# Patient Record
Sex: MALE | Race: BLACK OR AFRICAN AMERICAN | Employment: UNEMPLOYED | URBAN - METROPOLITAN AREA
[De-identification: names, ages, dates, MRNs, and addresses within clinical notes are randomized per-mention and may not be internally consistent; named-entity substitution may affect disease eponyms.]

---

## 2018-09-10 ENCOUNTER — OFFICE VISIT (OUTPATIENT)
Dept: PRIMARY CARE CLINIC | Age: 58
End: 2018-09-10

## 2018-09-10 VITALS
BODY MASS INDEX: 24.56 KG/M2 | WEIGHT: 208 LBS | TEMPERATURE: 97.1 F | HEART RATE: 78 BPM | OXYGEN SATURATION: 97 % | HEIGHT: 77 IN | DIASTOLIC BLOOD PRESSURE: 78 MMHG | SYSTOLIC BLOOD PRESSURE: 123 MMHG

## 2018-09-10 DIAGNOSIS — M19.042 ARTHRITIS OF BOTH HANDS: ICD-10-CM

## 2018-09-10 DIAGNOSIS — M50.30 DDD (DEGENERATIVE DISC DISEASE), CERVICAL: ICD-10-CM

## 2018-09-10 DIAGNOSIS — M19.041 ARTHRITIS OF BOTH HANDS: ICD-10-CM

## 2018-09-10 DIAGNOSIS — I10 ESSENTIAL HYPERTENSION: ICD-10-CM

## 2018-09-10 DIAGNOSIS — M51.36 DDD (DEGENERATIVE DISC DISEASE), LUMBAR: ICD-10-CM

## 2018-09-10 DIAGNOSIS — Z23 NEED FOR PROPHYLACTIC VACCINATION AND INOCULATION AGAINST VARICELLA: ICD-10-CM

## 2018-09-10 DIAGNOSIS — R76.8 ANA POSITIVE: ICD-10-CM

## 2018-09-10 DIAGNOSIS — Z11.4 SCREENING FOR HIV (HUMAN IMMUNODEFICIENCY VIRUS): ICD-10-CM

## 2018-09-10 DIAGNOSIS — R76.8 SS-B ANTIBODY POSITIVE: ICD-10-CM

## 2018-09-10 DIAGNOSIS — Z23 NEED FOR SHINGLES VACCINE: ICD-10-CM

## 2018-09-10 DIAGNOSIS — I10 ESSENTIAL HYPERTENSION: Primary | ICD-10-CM

## 2018-09-10 DIAGNOSIS — R76.8 SS-A ANTIBODY POSITIVE: ICD-10-CM

## 2018-09-10 DIAGNOSIS — Z12.11 SCREENING FOR COLON CANCER: ICD-10-CM

## 2018-09-10 LAB
A/G RATIO: 1.1 (ref 1.1–2.2)
ALBUMIN SERPL-MCNC: 4.5 G/DL (ref 3.4–5)
ALP BLD-CCNC: 87 U/L (ref 40–129)
ALT SERPL-CCNC: 12 U/L (ref 10–40)
ANION GAP SERPL CALCULATED.3IONS-SCNC: 13 MMOL/L (ref 3–16)
AST SERPL-CCNC: 15 U/L (ref 15–37)
BASOPHILS ABSOLUTE: 0 K/UL (ref 0–0.2)
BASOPHILS RELATIVE PERCENT: 0.7 %
BILIRUB SERPL-MCNC: 0.3 MG/DL (ref 0–1)
BUN BLDV-MCNC: 9 MG/DL (ref 7–20)
CALCIUM SERPL-MCNC: 10 MG/DL (ref 8.3–10.6)
CHLORIDE BLD-SCNC: 102 MMOL/L (ref 99–110)
CO2: 27 MMOL/L (ref 21–32)
CREAT SERPL-MCNC: 1 MG/DL (ref 0.9–1.3)
EOSINOPHILS ABSOLUTE: 0.3 K/UL (ref 0–0.6)
EOSINOPHILS RELATIVE PERCENT: 6.6 %
GFR AFRICAN AMERICAN: >60
GFR NON-AFRICAN AMERICAN: >60
GLOBULIN: 4 G/DL
GLUCOSE BLD-MCNC: 87 MG/DL (ref 70–99)
HCT VFR BLD CALC: 41.4 % (ref 40.5–52.5)
HEMOGLOBIN: 13.2 G/DL (ref 13.5–17.5)
LYMPHOCYTES ABSOLUTE: 1.5 K/UL (ref 1–5.1)
LYMPHOCYTES RELATIVE PERCENT: 33 %
MCH RBC QN AUTO: 24.9 PG (ref 26–34)
MCHC RBC AUTO-ENTMCNC: 31.8 G/DL (ref 31–36)
MCV RBC AUTO: 78.3 FL (ref 80–100)
MONOCYTES ABSOLUTE: 0.4 K/UL (ref 0–1.3)
MONOCYTES RELATIVE PERCENT: 8.6 %
NEUTROPHILS ABSOLUTE: 2.4 K/UL (ref 1.7–7.7)
NEUTROPHILS RELATIVE PERCENT: 51.1 %
PDW BLD-RTO: 18.9 % (ref 12.4–15.4)
PLATELET # BLD: 274 K/UL (ref 135–450)
PMV BLD AUTO: 8.6 FL (ref 5–10.5)
POTASSIUM SERPL-SCNC: 4.7 MMOL/L (ref 3.5–5.1)
RBC # BLD: 5.29 M/UL (ref 4.2–5.9)
SEDIMENTATION RATE, ERYTHROCYTE: 24 MM/HR (ref 0–20)
SODIUM BLD-SCNC: 142 MMOL/L (ref 136–145)
TOTAL PROTEIN: 8.5 G/DL (ref 6.4–8.2)
TSH SERPL DL<=0.05 MIU/L-ACNC: 1.84 UIU/ML (ref 0.27–4.2)
URIC ACID, SERUM: 8.4 MG/DL (ref 3.5–7.2)
WBC # BLD: 4.6 K/UL (ref 4–11)

## 2018-09-10 PROCEDURE — G8420 CALC BMI NORM PARAMETERS: HCPCS | Performed by: FAMILY MEDICINE

## 2018-09-10 PROCEDURE — G8427 DOCREV CUR MEDS BY ELIG CLIN: HCPCS | Performed by: FAMILY MEDICINE

## 2018-09-10 PROCEDURE — 3017F COLORECTAL CA SCREEN DOC REV: CPT | Performed by: FAMILY MEDICINE

## 2018-09-10 PROCEDURE — 99204 OFFICE O/P NEW MOD 45 MIN: CPT | Performed by: FAMILY MEDICINE

## 2018-09-10 PROCEDURE — 1036F TOBACCO NON-USER: CPT | Performed by: FAMILY MEDICINE

## 2018-09-10 RX ORDER — LISINOPRIL 20 MG/1
20 TABLET ORAL DAILY
Qty: 30 TABLET | Refills: 3 | Status: SHIPPED | OUTPATIENT
Start: 2018-09-10 | End: 2019-01-31 | Stop reason: SDUPTHER

## 2018-09-10 RX ORDER — CYCLOBENZAPRINE HCL 10 MG
10 TABLET ORAL 2 TIMES DAILY PRN
Qty: 60 TABLET | Refills: 1 | Status: SHIPPED | OUTPATIENT
Start: 2018-09-10 | End: 2019-01-16 | Stop reason: SDUPTHER

## 2018-09-10 RX ORDER — GABAPENTIN 600 MG/1
600 TABLET ORAL 3 TIMES DAILY
COMMUNITY
Start: 2015-10-08 | End: 2018-09-10 | Stop reason: SDUPTHER

## 2018-09-10 RX ORDER — OXYCODONE AND ACETAMINOPHEN 7.5; 325 MG/1; MG/1
1 TABLET ORAL EVERY 6 HOURS PRN
COMMUNITY
End: 2018-10-16 | Stop reason: ALTCHOICE

## 2018-09-10 RX ORDER — GABAPENTIN 600 MG/1
600 TABLET ORAL 3 TIMES DAILY
Qty: 90 TABLET | Refills: 5 | Status: SHIPPED | OUTPATIENT
Start: 2018-09-10 | End: 2020-08-12 | Stop reason: SDUPTHER

## 2018-09-10 RX ORDER — NAPROXEN 500 MG/1
500 TABLET ORAL 2 TIMES DAILY WITH MEALS
Qty: 60 TABLET | Refills: 3 | Status: SHIPPED | OUTPATIENT
Start: 2018-09-10 | End: 2019-02-12 | Stop reason: SDUPTHER

## 2018-09-10 ASSESSMENT — ENCOUNTER SYMPTOMS
SORE THROAT: 0
EYE PAIN: 0
CHOKING: 0
VOICE CHANGE: 0
ABDOMINAL PAIN: 0
CHEST TIGHTNESS: 0
NAUSEA: 0
ANAL BLEEDING: 0
PHOTOPHOBIA: 0
FACIAL SWELLING: 0
VOMITING: 0
COUGH: 0
EYE ITCHING: 0
ORTHOPNEA: 0
EYE REDNESS: 0
APNEA: 0
COLOR CHANGE: 0
EYE DISCHARGE: 0
BLURRED VISION: 0
BLOOD IN STOOL: 0
TROUBLE SWALLOWING: 0
SINUS PRESSURE: 0
RECTAL PAIN: 0
CONSTIPATION: 0
BACK PAIN: 0
SHORTNESS OF BREATH: 0
WHEEZING: 0

## 2018-09-10 ASSESSMENT — PATIENT HEALTH QUESTIONNAIRE - PHQ9
SUM OF ALL RESPONSES TO PHQ9 QUESTIONS 1 & 2: 1
1. LITTLE INTEREST OR PLEASURE IN DOING THINGS: 0
SUM OF ALL RESPONSES TO PHQ QUESTIONS 1-9: 1
2. FEELING DOWN, DEPRESSED OR HOPELESS: 1
SUM OF ALL RESPONSES TO PHQ QUESTIONS 1-9: 1

## 2018-09-10 NOTE — PROGRESS NOTES
Never Used    Alcohol use No    Drug use: No    Sexual activity: Not on file     Other Topics Concern    Not on file     Social History Narrative    No narrative on file     Past Surgical History:   Procedure Laterality Date    COLONOSCOPY      ENDOSCOPY, COLON, DIAGNOSTIC         Review of Systems   Constitutional: Negative for activity change, appetite change, chills, diaphoresis, fatigue, fever, malaise/fatigue and unexpected weight change. HENT: Negative for congestion, dental problem, drooling, ear discharge, ear pain, facial swelling, hearing loss, mouth sores, nosebleeds, postnasal drip, sinus pressure, sneezing, sore throat, tinnitus, trouble swallowing and voice change. Eyes: Negative for blurred vision, photophobia, pain, discharge, redness, itching and visual disturbance. Respiratory: Negative for apnea, cough, choking, chest tightness, shortness of breath and wheezing. Cardiovascular: Negative for chest pain, palpitations, orthopnea, leg swelling and PND. Gastrointestinal: Negative for abdominal pain, anal bleeding, blood in stool, constipation, nausea, rectal pain and vomiting. Genitourinary: Negative for decreased urine volume, difficulty urinating, discharge, dysuria, enuresis, flank pain, frequency, hematuria, penile swelling, scrotal swelling, testicular pain and urgency. Musculoskeletal: Negative for arthralgias, back pain, gait problem, joint swelling, myalgias, neck pain and neck stiffness. Bilat hand pain, left upper and lower extremities pain/numbness    Chronic neck and lumbar pain       Skin: Negative for color change, pallor, rash and wound. Neurological: Negative for dizziness, tremors, seizures, syncope, facial asymmetry, speech difficulty, weakness, light-headedness, numbness and headaches. Hematological: Negative for adenopathy. Does not bruise/bleed easily.    Psychiatric/Behavioral: Negative for agitation, behavioral problems, confusion, decreased

## 2018-09-11 LAB
HIV AG/AB: NORMAL
HIV ANTIGEN: NORMAL
HIV-1 ANTIBODY: NORMAL
HIV-2 AB: NORMAL

## 2018-09-12 LAB
BILIRUBIN URINE: NEGATIVE
BLOOD, URINE: NEGATIVE
CLARITY: CLEAR
COLOR: YELLOW
GLUCOSE URINE: NEGATIVE MG/DL
KETONES, URINE: NEGATIVE MG/DL
LEUKOCYTE ESTERASE, URINE: NEGATIVE
MICROSCOPIC EXAMINATION: NORMAL
NITRITE, URINE: NEGATIVE
PH UA: 6
PROTEIN UA: NEGATIVE MG/DL
SPECIFIC GRAVITY UA: 1.02
URINE TYPE: NORMAL
UROBILINOGEN, URINE: 0.2 E.U./DL

## 2018-09-14 DIAGNOSIS — Z12.11 SCREENING FOR COLON CANCER: ICD-10-CM

## 2018-09-14 LAB
CONTROL: NORMAL
HEMOCCULT STL QL: NEGATIVE

## 2018-09-14 PROCEDURE — 82274 ASSAY TEST FOR BLOOD FECAL: CPT | Performed by: FAMILY MEDICINE

## 2018-09-15 ENCOUNTER — HOSPITAL ENCOUNTER (OUTPATIENT)
Dept: OTHER | Age: 58
Discharge: HOME | End: 2018-09-15
Attending: FAMILY MEDICINE | Admitting: FAMILY MEDICINE

## 2018-09-15 DIAGNOSIS — M50.30 DDD (DEGENERATIVE DISC DISEASE), CERVICAL: ICD-10-CM

## 2018-09-15 DIAGNOSIS — M51.36 DDD (DEGENERATIVE DISC DISEASE), LUMBAR: ICD-10-CM

## 2018-10-01 ENCOUNTER — OFFICE VISIT (OUTPATIENT)
Dept: PRIMARY CARE CLINIC | Age: 58
End: 2018-10-01
Payer: COMMERCIAL

## 2018-10-01 VITALS
OXYGEN SATURATION: 97 % | DIASTOLIC BLOOD PRESSURE: 96 MMHG | HEIGHT: 77 IN | HEART RATE: 72 BPM | WEIGHT: 221 LBS | SYSTOLIC BLOOD PRESSURE: 155 MMHG | TEMPERATURE: 96.9 F | BODY MASS INDEX: 26.09 KG/M2

## 2018-10-01 DIAGNOSIS — M51.36 DDD (DEGENERATIVE DISC DISEASE), LUMBAR: ICD-10-CM

## 2018-10-01 DIAGNOSIS — Z23 NEED FOR INFLUENZA VACCINATION: ICD-10-CM

## 2018-10-01 DIAGNOSIS — R76.8 SS-B ANTIBODY POSITIVE: ICD-10-CM

## 2018-10-01 DIAGNOSIS — D64.9 ANEMIA, UNSPECIFIED TYPE: ICD-10-CM

## 2018-10-01 DIAGNOSIS — R73.03 PREDIABETES: ICD-10-CM

## 2018-10-01 DIAGNOSIS — R76.8 SS-A ANTIBODY POSITIVE: ICD-10-CM

## 2018-10-01 DIAGNOSIS — R76.8 ANA POSITIVE: ICD-10-CM

## 2018-10-01 DIAGNOSIS — I10 ESSENTIAL HYPERTENSION: Primary | ICD-10-CM

## 2018-10-01 DIAGNOSIS — M19.049 HAND ARTHRITIS: ICD-10-CM

## 2018-10-01 PROCEDURE — G8419 CALC BMI OUT NRM PARAM NOF/U: HCPCS | Performed by: FAMILY MEDICINE

## 2018-10-01 PROCEDURE — 99213 OFFICE O/P EST LOW 20 MIN: CPT | Performed by: FAMILY MEDICINE

## 2018-10-01 PROCEDURE — 3017F COLORECTAL CA SCREEN DOC REV: CPT | Performed by: FAMILY MEDICINE

## 2018-10-01 PROCEDURE — G8427 DOCREV CUR MEDS BY ELIG CLIN: HCPCS | Performed by: FAMILY MEDICINE

## 2018-10-01 PROCEDURE — 1036F TOBACCO NON-USER: CPT | Performed by: FAMILY MEDICINE

## 2018-10-01 PROCEDURE — 90471 IMMUNIZATION ADMIN: CPT | Performed by: FAMILY MEDICINE

## 2018-10-01 PROCEDURE — G8482 FLU IMMUNIZE ORDER/ADMIN: HCPCS | Performed by: FAMILY MEDICINE

## 2018-10-01 PROCEDURE — 90686 IIV4 VACC NO PRSV 0.5 ML IM: CPT | Performed by: FAMILY MEDICINE

## 2018-10-01 ASSESSMENT — ENCOUNTER SYMPTOMS
ABDOMINAL PAIN: 0
CHOKING: 0
RECTAL PAIN: 0
EYE DISCHARGE: 0
TROUBLE SWALLOWING: 0
NAUSEA: 0
PHOTOPHOBIA: 0
EYE PAIN: 0
SORE THROAT: 0
COLOR CHANGE: 0
VOICE CHANGE: 0
APNEA: 0
EYE REDNESS: 0
FACIAL SWELLING: 0
CHEST TIGHTNESS: 0
WHEEZING: 0
SINUS PRESSURE: 0
SHORTNESS OF BREATH: 0
BLOOD IN STOOL: 0
VOMITING: 0
EYE ITCHING: 0
BACK PAIN: 1
COUGH: 0
ANAL BLEEDING: 0
CONSTIPATION: 0

## 2018-10-01 NOTE — PATIENT INSTRUCTIONS
Patient Education          influenza virus vaccine (injection)  Pronunciation:  in judd lala VAK seen  Brand:  Afluria [id number], Afluria Preservative-Free [id number], Afluria Preservative-Free Quadrivalent [id number], Afluria Quadrivalent [id number], Fluad [id number], Fluarix Preservative-Free Quadrivalent [id number], Flublok Quadrivalent [id number], FluLaval Preservative-Free Quadrivalent [id number], FluLaval Quadrivalent [id number], Fluvirin [id number], Fluvirin Preservative-Free [id number], Fluzone High-Dose [id number], Fluzone Preservative-Free Quadrivalent 1205-2816, Fluzone Quadrivalent [id number], Fluzone Quadrivalent Intradermal [id number]  What is the most important information I should know about this vaccine? The injectable influenza virus vaccine (flu shot) is a \"killed virus\" vaccine. Influenza virus vaccine is also available in a nasal spray form, which is a \"live virus\" vaccine. This medication guide addresses only the injectable form of this vaccine. Becoming infected with influenza is much more dangerous to your health than receiving this vaccine. However, like any medicine, this vaccine can cause side effects but the risk of serious side effects is extremely low. What is influenza virus vaccine? Influenza virus (commonly known as \"the flu\") is a serious disease caused by a virus. Influenza virus can spread from one person to another through small droplets of saliva that are expelled into the air when an infected person coughs or sneezes. The virus can also be passed through contact with objects the infected person has touched, such as a door handle or other surfaces. Influenza virus vaccine is used to prevent infection caused by influenza virus. The vaccine is redeveloped each year to contain specific strains of inactivated (killed) flu virus that are recommended by public health officials for that year. The injectable influenza virus vaccine (flu shot) is a \"killed virus\" vaccine.  Influenza wait until the other treatments are finished:  · phenytoin, theophylline, or warfarin (Coumadin, Jantoven);  · an oral, nasal, inhaled, or injectable steroid medicine;  · medications to treat psoriasis, rheumatoid arthritis, or other autoimmune disorders--azathioprine, etanercept, leflunomide, and others; or  · medicines to treat or prevent organ transplant rejection--basiliximab, cyclosporine, muromonab-CD3, mycophenolate mofetil, sirolimus, tacrolimus. This list is not complete. Other drugs may interact with influenza virus vaccine, including prescription and over-the-counter medicines, vitamins, and herbal products. Not all possible interactions are listed in this medication guide. Where can I get more information? Your doctor or pharmacist can provide more information about this vaccine. Additional information is available from your local health department or the Centers for Disease Control and Prevention. Remember, keep this and all other medicines out of the reach of children, never share your medicines with others, and use this medication only for the indication prescribed. Every effort has been made to ensure that the information provided by [address] is accurate, up-to-date, and complete, but no guarantee is made to that effect. Drug information contained herein may be time sensitive. [facility] information has been compiled for use by healthcare practitioners and consumers in the [facility]  and therefore [facility] does not warrant that uses outside of the [facility] are appropriate, unless specifically indicated otherwise. [facility] drug information does not endorse drugs, diagnose patients or recommend therapy.  [facility] drug information is an informational resource designed to assist licensed healthcare practitioners in caring for their patients and/or to serve consumers viewing this service as a supplement to, and not a substitute for, the expertise, skill, knowledge and judgment of healthcare practitioners. The absence of a warning for a given drug or drug combination in no way should be construed to indicate that the drug or drug combination is safe, effective or appropriate for any given patient. [facility] does not assume any responsibility for any aspect of healthcare administered with the aid of information [facility] provides. The information contained herein is not intended to cover all possible uses, directions, precautions, warnings, drug interactions, allergic reactions, or adverse effects. If you have questions about the drugs you are taking, check with your doctor, nurse or pharmacist.  Copyright 0024-2107 [address]. Version: 7.12. Revision date: 8/4/2017. Care instructions adapted under license by [facility] ([facility]). If you have questions about a medical condition or this instruction, always ask your healthcare professional. [place] [address] any warranty or liability for your use of this information.

## 2018-10-01 NOTE — PROGRESS NOTES
Psychiatric: He has a normal mood and affect. His behavior is normal. Judgment and thought content normal.   Nursing note and vitals reviewed. xrays show deg changes in the cervical spine and lumbar spine without acute abnormality. Assessment:      1. Essential hypertension  bp is up  Has not gotten refill of lisinopril yet  Asked him to do so    2. DDD (degenerative disc disease), lumbar    amb ref Yris Glynn MD  [address]    3. Hand arthritis        4. Anemia, unspecified type  Ref for possible colonoscopy  - Amb External Referral To Gastroenterology    5. TISHA positive    -    6. SS-A antibody positive    -     7. SS-B antibody positive        8.  Need for influenza vaccination    - INFLUENZA, QUADV, 3 YRS AND OLDER, IM, PF, PREFILL SYR OR SDV, 0.5ML (FLUZONE QUADV, PF)       Plan:         see me in 2 months       Fredrick De Anda MD

## 2018-10-05 ENCOUNTER — TELEPHONE (OUTPATIENT)
Dept: PAIN MANAGEMENT | Age: 58
End: 2018-10-05

## 2018-10-15 NOTE — PROGRESS NOTES
surgical intervention. 2. Plan of Care Recommendations:    Chronic pain in lower back>neck with radiation to left side; no acute changes. Pain unimproved by lumbar fusion in [place]; recently moved back to [place]. Review of XRays showed PLIF L3-S1 in lumbar spine and spondylotic changes in cervical spine. Recommend PT, aquatherapy and TENS trial; encouraged regular home exercises. History of TISHA positive arthropathy - will refer to Rheum. Ongoing depression issues - will refer to [facility] and place him on nortriptyline. Continue naprosyn, gabapentin and Flexeril - will provide Percocet 5 mg for PRN use. Follow with us after therapy for further options. · Imaging/Testing/Referrals: Rheumatology    · Physical therapy: PT, aquatherapy and TENS    · Behavioral Health: Refer to [facility] for counseling. · Interventions: TBD    · Adjuvant medications: Naprosyn, Flexeril, Neurontin, nortriptyline. · Prescription Pain Medication: Percocet 5 mg for PRN use.    - recommend limited use for flare-ups. · MEDD current (per OARRS) = 0  · Opioid Risk Tool (ORT): 3; Risk level - LOW   · Other Risk factors: Depression. · Urine Drug Screen: Today. · Medication Agreement: Today. · /OARRS review: Reviewed today. Attestation: The Prescription Monitoring Report for this patient was reviewed today. Lupe Banks MD)  Documentation: Possible medication side effects, risk of tolerance/dependence & alternative treatments discussed. , Random urine drug screen sent today., No signs of potential drug abuse or diversion identified. Lupe Banks MD)     - Reviewed OARRS report in detail, including Narx Scores and Overdose Risk Score.    - Counseled on dual effects, limitations, side effects and long-term complications of opioids including but not limited to opioid induced constipation, depression of breathing, sleep disorders, hormonal suppression, altered immune system, elevated cardiac risk, worsening pain or hyperalgesia, dependence and addiction. Discussed proper use and potential life threatening side effects of inappropriate use. - Discussed safe use of prescription pain medications, including not to take more than prescribed, not to combine with alcohol or other substances, not to drive while on pain medications, store medications safely, and measures for prevention of overdose. 4. Education:    - Educational material provided on multidisciplinary chronic pain management. - Encouraged smoking cessation, weight loss measures, regular home exercises and strengthening as long-term goals. - Counseled on role and limitations of medications and injections.  - Educational material from [employer] on 'Prescription Opioids', and 'Non-opioid treatment of chronic pain' provided. 5. Follow-up: RTC X 2 months. - The entire treatment plan was discussed with patient and agreed. - More than 45 minutes spent on face-to-face encounter with the patient by the provider.   - More than 50% of the time spent on counseling/coordination of care regarding chronic pain. Thank you for the referral - please do not hesitate to contact us if you have any questions or concerns.           Abimael Snyder MD  Board Certified in Anesthesiology and Pain Medicine

## 2018-10-16 ENCOUNTER — OFFICE VISIT (OUTPATIENT)
Dept: PAIN MANAGEMENT | Age: 58
End: 2018-10-16
Payer: COMMERCIAL

## 2018-10-16 VITALS
HEIGHT: 77 IN | BODY MASS INDEX: 26.09 KG/M2 | DIASTOLIC BLOOD PRESSURE: 84 MMHG | WEIGHT: 221 LBS | SYSTOLIC BLOOD PRESSURE: 138 MMHG

## 2018-10-16 DIAGNOSIS — M54.16 LUMBAR RADICULOPATHY: ICD-10-CM

## 2018-10-16 DIAGNOSIS — G89.4 CHRONIC PAIN SYNDROME: ICD-10-CM

## 2018-10-16 DIAGNOSIS — Z51.81 ENCOUNTER FOR MONITORING OPIOID MAINTENANCE THERAPY: ICD-10-CM

## 2018-10-16 DIAGNOSIS — Z79.891 ENCOUNTER FOR MONITORING OPIOID MAINTENANCE THERAPY: ICD-10-CM

## 2018-10-16 DIAGNOSIS — M54.12 CERVICAL RADICULOPATHY: ICD-10-CM

## 2018-10-16 DIAGNOSIS — F32.89 OTHER DEPRESSION: ICD-10-CM

## 2018-10-16 DIAGNOSIS — M96.1 LUMBAR POSTLAMINECTOMY SYNDROME: Primary | ICD-10-CM

## 2018-10-16 DIAGNOSIS — M50.30 DEGENERATIVE DISC DISEASE, CERVICAL: ICD-10-CM

## 2018-10-16 PROCEDURE — G8427 DOCREV CUR MEDS BY ELIG CLIN: HCPCS | Performed by: ANESTHESIOLOGY

## 2018-10-16 PROCEDURE — 1036F TOBACCO NON-USER: CPT | Performed by: ANESTHESIOLOGY

## 2018-10-16 PROCEDURE — 3017F COLORECTAL CA SCREEN DOC REV: CPT | Performed by: ANESTHESIOLOGY

## 2018-10-16 PROCEDURE — G8419 CALC BMI OUT NRM PARAM NOF/U: HCPCS | Performed by: ANESTHESIOLOGY

## 2018-10-16 PROCEDURE — 99204 OFFICE O/P NEW MOD 45 MIN: CPT | Performed by: ANESTHESIOLOGY

## 2018-10-16 PROCEDURE — G8482 FLU IMMUNIZE ORDER/ADMIN: HCPCS | Performed by: ANESTHESIOLOGY

## 2018-10-16 RX ORDER — OXYCODONE HYDROCHLORIDE AND ACETAMINOPHEN 5; 325 MG/1; MG/1
1 TABLET ORAL EVERY 8 HOURS PRN
Qty: 20 TABLET | Refills: 0 | Status: SHIPPED | OUTPATIENT
Start: 2018-10-16 | End: 2018-10-23

## 2018-10-16 RX ORDER — NORTRIPTYLINE HYDROCHLORIDE 10 MG/1
CAPSULE ORAL
Qty: 60 CAPSULE | Refills: 1 | Status: SHIPPED | OUTPATIENT
Start: 2018-10-16 | End: 2018-12-17 | Stop reason: ALTCHOICE

## 2018-10-20 LAB
6-ACETYLMORPHINE: NOT DETECTED
7-AMINOCLONAZEPAM: NOT DETECTED
ALPHA-OH-ALPRAZOLAM: NOT DETECTED
ALPRAZOLAM: NOT DETECTED
AMPHETAMINE: NOT DETECTED
BARBITURATES: NOT DETECTED
BENZOYLECGONINE: PRESENT
BUPRENORPHINE: NOT DETECTED
CARISOPRODOL: NOT DETECTED
CLONAZEPAM: NOT DETECTED
CODEINE: NOT DETECTED
CREATININE URINE: 127.1 MG/DL (ref 20–400)
DIAZEPAM: NOT DETECTED
DRUGS EXPECTED: NORMAL
EER PAIN MGT DRUG PANEL, HIGH RES/EMIT U: NORMAL
ETHYL GLUCURONIDE: NOT DETECTED
FENTANYL: NOT DETECTED
HYDROCODONE: NOT DETECTED
HYDROMORPHONE: NOT DETECTED
LORAZEPAM: NOT DETECTED
MARIJUANA METABOLITE: NOT DETECTED
MDA: NOT DETECTED
MDEA: NOT DETECTED
MDMA URINE: NOT DETECTED
MEPERIDINE: NOT DETECTED
METHADONE: NOT DETECTED
METHAMPHETAMINE: NOT DETECTED
METHYLPHENIDATE: NOT DETECTED
MIDAZOLAM: NOT DETECTED
MORPHINE: NOT DETECTED
NORBUPRENORPHINE, FREE: NOT DETECTED
NORDIAZEPAM: NOT DETECTED
NORFENTANYL: NOT DETECTED
NORHYDROCODONE, URINE: NOT DETECTED
NOROXYCODONE: NOT DETECTED
NOROXYMORPHONE, URINE: NOT DETECTED
OXAZEPAM: NOT DETECTED
OXYCODONE: NOT DETECTED
OXYMORPHONE: NOT DETECTED
PAIN MANAGEMENT DRUG PANEL: NORMAL
PAIN MANAGEMENT DRUG PANEL: NORMAL
PCP: NOT DETECTED
PHENTERMINE: NOT DETECTED
PROPOXYPHENE: NOT DETECTED
TAPENTADOL, URINE: NOT DETECTED
TAPENTADOL-O-SULFATE, URINE: NOT DETECTED
TEMAZEPAM: NOT DETECTED
TRAMADOL: NOT DETECTED
ZOLPIDEM: NOT DETECTED

## 2018-11-02 ENCOUNTER — OFFICE VISIT (OUTPATIENT)
Dept: PRIMARY CARE CLINIC | Age: 58
End: 2018-11-02
Payer: COMMERCIAL

## 2018-11-02 VITALS
TEMPERATURE: 96.9 F | SYSTOLIC BLOOD PRESSURE: 130 MMHG | DIASTOLIC BLOOD PRESSURE: 83 MMHG | WEIGHT: 228 LBS | BODY MASS INDEX: 26.92 KG/M2 | HEART RATE: 95 BPM | HEIGHT: 77 IN | OXYGEN SATURATION: 97 %

## 2018-11-02 DIAGNOSIS — M50.30 DDD (DEGENERATIVE DISC DISEASE), CERVICAL: ICD-10-CM

## 2018-11-02 DIAGNOSIS — M51.36 DDD (DEGENERATIVE DISC DISEASE), LUMBAR: ICD-10-CM

## 2018-11-02 DIAGNOSIS — I10 ESSENTIAL HYPERTENSION: Primary | ICD-10-CM

## 2018-11-02 PROCEDURE — 99214 OFFICE O/P EST MOD 30 MIN: CPT | Performed by: FAMILY MEDICINE

## 2018-11-02 PROCEDURE — 3017F COLORECTAL CA SCREEN DOC REV: CPT | Performed by: FAMILY MEDICINE

## 2018-11-02 PROCEDURE — G8427 DOCREV CUR MEDS BY ELIG CLIN: HCPCS | Performed by: FAMILY MEDICINE

## 2018-11-02 PROCEDURE — G8419 CALC BMI OUT NRM PARAM NOF/U: HCPCS | Performed by: FAMILY MEDICINE

## 2018-11-02 PROCEDURE — G8482 FLU IMMUNIZE ORDER/ADMIN: HCPCS | Performed by: FAMILY MEDICINE

## 2018-11-02 PROCEDURE — 1036F TOBACCO NON-USER: CPT | Performed by: FAMILY MEDICINE

## 2018-11-02 ASSESSMENT — ENCOUNTER SYMPTOMS
SORE THROAT: 0
SHORTNESS OF BREATH: 0
BLURRED VISION: 0
EYE ITCHING: 0
FACIAL SWELLING: 0
APNEA: 0
EYE PAIN: 0
BACK PAIN: 1
ORTHOPNEA: 0
COUGH: 0
CHEST TIGHTNESS: 0
CONSTIPATION: 0
VOICE CHANGE: 0
SINUS PRESSURE: 0
NAUSEA: 0
EYE DISCHARGE: 0
ANAL BLEEDING: 0
TROUBLE SWALLOWING: 0
CHOKING: 0
COLOR CHANGE: 0
BLOOD IN STOOL: 0
EYE REDNESS: 0
RECTAL PAIN: 0
WHEEZING: 0
ABDOMINAL PAIN: 0
VOMITING: 0
PHOTOPHOBIA: 0

## 2018-11-02 NOTE — PROGRESS NOTES
decreased urine volume, difficulty urinating, discharge, dysuria, enuresis, flank pain, frequency, hematuria, penile swelling, scrotal swelling, testicular pain and urgency. Musculoskeletal: Positive for back pain. Negative for arthralgias, gait problem, joint swelling, myalgias, neck pain and neck stiffness. Left arm, left leg pain/numbness   Skin: Negative for color change, pallor, rash and wound. Neurological: Negative for dizziness, tremors, seizures, syncope, facial asymmetry, speech difficulty, weakness, light-headedness, numbness and headaches. Hematological: Negative for adenopathy. Does not bruise/bleed easily. Psychiatric/Behavioral: Negative for agitation, behavioral problems, confusion, decreased concentration, dysphoric mood, hallucinations, self-injury, sleep disturbance and suicidal ideas. The patient is not nervous/anxious and is not hyperactive. Objective:   Physical Exam   Constitutional: He is oriented to person, place, and time. He appears well-developed and well-nourished. No distress. HENT:   Head: Normocephalic and atraumatic. Right Ear: External ear normal.   Left Ear: External ear normal.   Nose: Nose normal.   Mouth/Throat: Oropharynx is clear and moist. No oropharyngeal exudate. Eyes: Pupils are equal, round, and reactive to light. Conjunctivae and EOM are normal. Right eye exhibits no discharge. Left eye exhibits no discharge. No scleral icterus. Neck: Normal range of motion. Neck supple. No JVD present. No tracheal deviation present. No thyromegaly present. Cardiovascular: Normal rate, regular rhythm, normal heart sounds and intact distal pulses. Exam reveals no friction rub. No murmur heard. Pulses:       Carotid pulses are 2+ on the right side, and 2+ on the left side. Radial pulses are 2+ on the right side, and 2+ on the left side. Femoral pulses are 2+ on the right side, and 2+ on the left side.        Popliteal pulses are 2+ on the right side, and 2+ on the left side. Dorsalis pedis pulses are 2+ on the right side, and 2+ on the left side. Posterior tibial pulses are 2+ on the right side, and 2+ on the left side. Pulmonary/Chest: Effort normal and breath sounds normal. No stridor. No respiratory distress. He has no wheezes. He has no rales. He exhibits no tenderness. Abdominal: Soft. Bowel sounds are normal. He exhibits no distension and no mass. There is no tenderness. There is no rebound and no guarding. Musculoskeletal: Normal range of motion. He exhibits no edema or tenderness. Lymphadenopathy:     He has no cervical adenopathy. Neurological: He is oriented to person, place, and time. He displays normal reflexes. No cranial nerve deficit. He exhibits normal muscle tone. Coordination normal.   Skin: Skin is warm and dry. No rash noted. He is not diaphoretic. No pallor. Psychiatric: He has a normal mood and affect. His behavior is normal. Judgment and thought content normal.   Nursing note and vitals reviewed. Assessment:      1. Essential hypertension  bp is controlled on current treatment  Recent renal    2. DDD (degenerative disc disease), lumbar  On nsaids/muscle relaxants  Fu in pain clinic  3.  DDD (degenerative disc disease), cervical  On nsaids/muscle relaxants    Fu in pain clinic    as SS-A and SS-B antibody positive  And rheumatology consult    Has numbness LUIE & LLE  emg pending            Plan:            See me in 2 months  Wild Frey MD

## 2018-12-14 PROBLEM — G89.4 CHRONIC PAIN SYNDROME: Status: ACTIVE | Noted: 2018-12-14

## 2018-12-14 PROBLEM — F19.11 H/O: SUBSTANCE ABUSE (HCC): Status: ACTIVE | Noted: 2018-12-14

## 2018-12-14 PROBLEM — M96.1 POSTLAMINECTOMY SYNDROME, LUMBAR: Status: ACTIVE | Noted: 2018-12-14

## 2018-12-14 NOTE — PROGRESS NOTES
[address] [facility] [address], [place], [address]  [phone] (B)  [phone] (f)    12/17/18        Hezzie Dubin  1960      Gavin Deluca MD      Chief Complaint:   Chief Complaint   Patient presents with    Lower Back Pain     F/u on Lt side lower back pain that radiates down the Lt leg into the calf.  Neck Pain     Lt side neck pain that radiates into the Lt shoulder and amy the Lt arm to the hand. History of Present Illness   HPI    Seen us first in 10/2018 -  Chronic pain in neck and lower back for 5 years since work injury, unimproved by lumbar fusion in . [address], moved to [place] recently. Pain constant in lower back>neck, achy, sharp, with tingling down the left leg; no weakness, or incontinence. Pain worse with daily activities, changes in weather, bending, walking and nothing seems to help. RED FLAG symptoms (Symptoms may include, but not limited to, acute trauma,fever, drug use, malignancy, weakness, perianal numbness, bladder/bowel changes) since last visit - No    Changes since last visit:  Not completed EMG; rescheduled for later this week. Seen Rheum (Dr Bossman Mittal) - awaiting labs. Not been to PT or seen [address] [place] [address].       Past Medical History:   Diagnosis Date    Arthropathy     TISHA positive    Back pain     Depression     Hyperlipidemia     Hypertension        Past Surgical History:   Procedure Laterality Date    COLONOSCOPY      ENDOSCOPY, COLON, DIAGNOSTIC      LUMBAR FUSION  2017    PLIF L3-S1 done in [facility]. [address]       No Known Allergies    Current Outpatient Prescriptions   Medication Sig Dispense Refill    traZODone (DESYREL) 50 MG tablet Take 1 tablet by mouth nightly as needed for Sleep (chronic pain) 30 tablet 0    lisinopril (PRINIVIL;ZESTRIL) 20 MG tablet Take 1 tablet by mouth daily 30 tablet 3    cyclobenzaprine (FLEXERIL) 10 MG tablet Take 1 tablet by mouth 2 times daily as needed for Muscle spasms 60 tablet 1    naproxen Genitourinary: Negative for frequency, hematuria and urgency. Musculoskeletal: Positive for arthralgias, back pain and neck pain. Negative for myalgias. Skin: Negative for rash. Neurological: Negative for dizziness, seizures, weakness and headaches. Hematological: Does not bruise/bleed easily. Psychiatric/Behavioral: Negative for suicidal ideas. The patient is not nervous/anxious. The patient was instructed to contact primary care physician regarding ROS positives not being addressed during today's visit. Physical Exam:   Physical Exam   Constitutional: He is oriented to person, place, and time. No distress. HENT:   Head: Normocephalic. Eyes: Pupils are equal, round, and reactive to light. EOM are normal.   Neck: Normal range of motion. Neck supple. No thyromegaly present. Cardiovascular: Normal rate, regular rhythm, normal heart sounds and intact distal pulses. Pulmonary/Chest: Effort normal and breath sounds normal. No respiratory distress. He exhibits no tenderness. Abdominal: Soft. Bowel sounds are normal. He exhibits no mass. There is no tenderness. There is no guarding. Musculoskeletal: Normal range of motion. He exhibits no tenderness or deformity. Lymphadenopathy:     He has no cervical adenopathy. Neurological: He is alert and oriented to person, place, and time. He has normal strength and normal reflexes. He displays normal reflexes. No cranial nerve deficit or sensory deficit. He exhibits normal muscle tone. Coordination and gait normal. He displays no Babinski's sign on the right side. He displays no Babinski's sign on the left side. Reflex Scores:       Tricep reflexes are 2+ on the right side and 2+ on the left side. Bicep reflexes are 2+ on the right side and 2+ on the left side. Brachioradialis reflexes are 2+ on the right side and 2+ on the left side. Patellar reflexes are 2+ on the right side and 2+ on the left side.        [place] in shared decision making. The entire treatment plan was discussed with patient and agreed. Thank you for allowing us to participate in the care of your patient - please do not hesitate to contact us if you have any questions or concerns.         Jeffrey Fry MD  Board Certified in Anesthesiology and Pain Medicine

## 2018-12-17 ENCOUNTER — OFFICE VISIT (OUTPATIENT)
Dept: PAIN MANAGEMENT | Age: 58
End: 2018-12-17
Payer: COMMERCIAL

## 2018-12-17 VITALS
WEIGHT: 228 LBS | HEIGHT: 77 IN | SYSTOLIC BLOOD PRESSURE: 122 MMHG | BODY MASS INDEX: 26.92 KG/M2 | DIASTOLIC BLOOD PRESSURE: 84 MMHG

## 2018-12-17 DIAGNOSIS — M13.0 POLYARTHROPATHY, MULTIPLE SITES: ICD-10-CM

## 2018-12-17 DIAGNOSIS — M96.1 POSTLAMINECTOMY SYNDROME, LUMBAR: Primary | ICD-10-CM

## 2018-12-17 DIAGNOSIS — M50.30 DDD (DEGENERATIVE DISC DISEASE), CERVICAL: ICD-10-CM

## 2018-12-17 DIAGNOSIS — F19.11 H/O: SUBSTANCE ABUSE (HCC): ICD-10-CM

## 2018-12-17 DIAGNOSIS — G89.4 CHRONIC PAIN SYNDROME: ICD-10-CM

## 2018-12-17 PROCEDURE — G8482 FLU IMMUNIZE ORDER/ADMIN: HCPCS | Performed by: ANESTHESIOLOGY

## 2018-12-17 PROCEDURE — 99214 OFFICE O/P EST MOD 30 MIN: CPT | Performed by: ANESTHESIOLOGY

## 2018-12-17 PROCEDURE — 1036F TOBACCO NON-USER: CPT | Performed by: ANESTHESIOLOGY

## 2018-12-17 PROCEDURE — 3017F COLORECTAL CA SCREEN DOC REV: CPT | Performed by: ANESTHESIOLOGY

## 2018-12-17 PROCEDURE — G8427 DOCREV CUR MEDS BY ELIG CLIN: HCPCS | Performed by: ANESTHESIOLOGY

## 2018-12-17 PROCEDURE — G8419 CALC BMI OUT NRM PARAM NOF/U: HCPCS | Performed by: ANESTHESIOLOGY

## 2018-12-17 RX ORDER — TRAZODONE HYDROCHLORIDE 50 MG/1
50 TABLET ORAL NIGHTLY PRN
Qty: 30 TABLET | Refills: 0 | Status: SHIPPED | OUTPATIENT
Start: 2018-12-17 | End: 2019-01-16

## 2018-12-17 ASSESSMENT — ENCOUNTER SYMPTOMS
NAUSEA: 0
EYE PAIN: 0
BACK PAIN: 1
SHORTNESS OF BREATH: 0
EYE REDNESS: 0
CONSTIPATION: 0
WHEEZING: 0
VOMITING: 0
ABDOMINAL PAIN: 0
EYE DISCHARGE: 0
COUGH: 0

## 2018-12-17 NOTE — PATIENT INSTRUCTIONS
I have ordered PT again. Complete EMG as scheduled. Continue naprosyn, gabapentin. I have provided trazodone for sleep and chronic pain    Patient Education        Learning About Managing Chronic Pain  What is a plan for pain management? A pain management plan helps you find ways to control pain with side effects you can live with. Some diseases and injuries can cause pain that lasts a long time. Constant pain can make you depressed. It can cause stress and make it hard for you to eat and sleep. But you don't need to live with uncontrolled pain. How can you plan for managing your pain? You and your doctor will work to make your plan. Your plan can include more than one type of pain control. You may take prescription or over-the-counter drugs. You can also try physical treatments, like massage and acupuncture. Other things can help too, such as meditation or a type of therapy to change how you think about your pain. It's important to let your doctor know how you prefer to control your pain. Sometimes the goal of a pain management plan isn't to totally get rid of pain. Instead, it might be to reduce the pain enough that daily activities are easier. If your pain isn't controlled well enough, talk with your doctor. You may need to make a new plan. Or your doctor may refer you to a specialist.  What medicines are used? Your doctor may prescribe medicine to help with your pain. If you aren't taking a prescription medicine, you may be able to take an over-the-counter one. Here are the main types of medicine for chronic pain. · Non-opioids. These are things like acetaminophen, such as Tylenol, and non-steroidal anti-inflammatory drugs (NSAIDs), such as Advil. · Opioids. Morphine, codeine, and oxycodone are some examples. · Other medicines. Antidepressants and anti-seizure medicines may be used. These medicines seem to change the way your brain senses pain.  Another option may be a nerve block injection. Medicines are the most common treatment for pain. But to feel better, you'll need to do more than take medicine. You can also do things like reducing your stress level and changing how you think. How can you take medicine safely? Medicines can help you get better. But they can also be dangerous, especially if you don't take them the right way. Be safe with medicines. Read and follow all instructions on the label. If the medicine you take causes side effects such as constipation or nausea, you may need to take other medicines for those problems. Talk to your doctor about any side effects you have. If you were prescribed an opioid pain reliever, your care team will give you information on how to use it safely. You will also get directions for how to safely store the medicine and how to get rid of any that's left over. Follow these instructions carefully. What physical treatments can help? Physical treatments can be an important part of managing chronic pain. You may find that combining more than one treatment helps the most.  These treatments can include:  · Heat or cold. This can help arthritis, sore muscles, and other aches. · Hydrotherapy. It uses flowing water to relax muscles. · Massage. Massage involves rubbing the soft tissues of the body. It eases tension and pain. · Transcutaneous electrical nerve stimulation (TENS). This treatment uses a gentle electric current applied to the skin for pain relief. · Acupuncture. This is a form of traditional [facility] medicine. It uses very thin needles inserted into certain points of the body. · Physical therapy. This treatment uses stretches and exercises to reduce pain and help you move better. If you get physical therapy, make sure to do any home exercises or stretching your therapist has prescribed. Stay as active as you can. Try to get some physical activity every day. What other things can help?   You can manage chronic pain by using things other

## 2018-12-26 DIAGNOSIS — M96.1 LUMBAR POSTLAMINECTOMY SYNDROME: ICD-10-CM

## 2018-12-26 DIAGNOSIS — M54.16 LUMBAR RADICULOPATHY: ICD-10-CM

## 2018-12-26 DIAGNOSIS — M54.12 CERVICAL RADICULOPATHY: ICD-10-CM

## 2018-12-26 DIAGNOSIS — M50.30 DEGENERATIVE DISC DISEASE, CERVICAL: ICD-10-CM

## 2018-12-28 RX ORDER — NORTRIPTYLINE HYDROCHLORIDE 10 MG/1
CAPSULE ORAL
Qty: 60 CAPSULE | Refills: 0 | OUTPATIENT
Start: 2018-12-28

## 2019-01-16 ENCOUNTER — OFFICE VISIT (OUTPATIENT)
Dept: PAIN MANAGEMENT | Age: 59
End: 2019-01-16
Payer: COMMERCIAL

## 2019-01-16 VITALS
HEART RATE: 85 BPM | DIASTOLIC BLOOD PRESSURE: 81 MMHG | BODY MASS INDEX: 29.66 KG/M2 | WEIGHT: 219 LBS | SYSTOLIC BLOOD PRESSURE: 136 MMHG | HEIGHT: 72 IN

## 2019-01-16 DIAGNOSIS — Z91.199 NONCOMPLIANCE WITH THERAPEUTIC PLAN: ICD-10-CM

## 2019-01-16 DIAGNOSIS — M96.1 POSTLAMINECTOMY SYNDROME, LUMBAR: Primary | ICD-10-CM

## 2019-01-16 DIAGNOSIS — G89.4 CHRONIC PAIN SYNDROME: ICD-10-CM

## 2019-01-16 DIAGNOSIS — M13.0 POLYARTHROPATHY, MULTIPLE SITES: ICD-10-CM

## 2019-01-16 DIAGNOSIS — M54.12 CERVICAL RADICULOPATHY: ICD-10-CM

## 2019-01-16 PROCEDURE — G8482 FLU IMMUNIZE ORDER/ADMIN: HCPCS | Performed by: ANESTHESIOLOGY

## 2019-01-16 PROCEDURE — 99214 OFFICE O/P EST MOD 30 MIN: CPT | Performed by: ANESTHESIOLOGY

## 2019-01-16 PROCEDURE — G8419 CALC BMI OUT NRM PARAM NOF/U: HCPCS | Performed by: ANESTHESIOLOGY

## 2019-01-16 PROCEDURE — 3017F COLORECTAL CA SCREEN DOC REV: CPT | Performed by: ANESTHESIOLOGY

## 2019-01-16 PROCEDURE — G8427 DOCREV CUR MEDS BY ELIG CLIN: HCPCS | Performed by: ANESTHESIOLOGY

## 2019-01-16 PROCEDURE — 1036F TOBACCO NON-USER: CPT | Performed by: ANESTHESIOLOGY

## 2019-01-16 RX ORDER — CYCLOBENZAPRINE HCL 10 MG
10 TABLET ORAL 2 TIMES DAILY PRN
Qty: 60 TABLET | Refills: 1 | Status: SHIPPED | OUTPATIENT
Start: 2019-01-16 | End: 2020-08-12

## 2019-01-16 ASSESSMENT — ENCOUNTER SYMPTOMS
BACK PAIN: 1
NAUSEA: 0
EYE REDNESS: 0
COUGH: 0
ABDOMINAL PAIN: 0
CONSTIPATION: 0
WHEEZING: 0
EYE DISCHARGE: 0
VOMITING: 0
EYE PAIN: 0
SHORTNESS OF BREATH: 0

## 2019-01-29 LAB
6-ACETYLMORPHINE: NOT DETECTED
7-AMINOCLONAZEPAM: NOT DETECTED
ALPHA-OH-ALPRAZOLAM: NOT DETECTED
ALPRAZOLAM: NOT DETECTED
AMPHETAMINE: NOT DETECTED
BARBITURATES: NOT DETECTED
BENZOYLECGONINE: PRESENT
BUPRENORPHINE: NOT DETECTED
CARISOPRODOL: NOT DETECTED
CLONAZEPAM: NOT DETECTED
CODEINE: NOT DETECTED
CREATININE URINE: 157.9 MG/DL (ref 20–400)
DIAZEPAM: NOT DETECTED
DRUGS EXPECTED: NORMAL
EER PAIN MGT DRUG PANEL, HIGH RES/EMIT U: NORMAL
ETHYL GLUCURONIDE: NOT DETECTED
FENTANYL: NOT DETECTED
HYDROCODONE: NOT DETECTED
HYDROMORPHONE: NOT DETECTED
LORAZEPAM: NOT DETECTED
MARIJUANA METABOLITE: NOT DETECTED
MDA: NOT DETECTED
MDEA: NOT DETECTED
MDMA URINE: NOT DETECTED
MEPERIDINE: NOT DETECTED
METHADONE: NOT DETECTED
METHAMPHETAMINE: NOT DETECTED
METHYLPHENIDATE: NOT DETECTED
MIDAZOLAM: NOT DETECTED
MORPHINE: NOT DETECTED
NORBUPRENORPHINE, FREE: NOT DETECTED
NORDIAZEPAM: NOT DETECTED
NORFENTANYL: NOT DETECTED
NORHYDROCODONE, URINE: NOT DETECTED
NOROXYCODONE: NOT DETECTED
NOROXYMORPHONE, URINE: NOT DETECTED
OXAZEPAM: NOT DETECTED
OXYCODONE: NOT DETECTED
OXYMORPHONE: NOT DETECTED
PAIN MANAGEMENT DRUG PANEL: NORMAL
PAIN MANAGEMENT DRUG PANEL: NORMAL
PCP: NOT DETECTED
PHENTERMINE: NOT DETECTED
PROPOXYPHENE: NOT DETECTED
TAPENTADOL, URINE: NOT DETECTED
TAPENTADOL-O-SULFATE, URINE: NOT DETECTED
TEMAZEPAM: NOT DETECTED
TRAMADOL: NOT DETECTED
ZOLPIDEM: NOT DETECTED

## 2019-01-31 DIAGNOSIS — I10 ESSENTIAL HYPERTENSION: ICD-10-CM

## 2019-01-31 RX ORDER — LISINOPRIL 20 MG/1
20 TABLET ORAL DAILY
Qty: 30 TABLET | Refills: 0 | Status: SHIPPED | OUTPATIENT
Start: 2019-01-31 | End: 2019-02-22 | Stop reason: SDUPTHER

## 2019-02-04 ENCOUNTER — PROCEDURE VISIT (OUTPATIENT)
Dept: NEUROLOGY | Age: 59
End: 2019-02-04
Payer: COMMERCIAL

## 2019-02-04 DIAGNOSIS — M79.601 RIGHT ARM PAIN: Primary | ICD-10-CM

## 2019-02-04 PROCEDURE — 95909 NRV CNDJ TST 5-6 STUDIES: CPT | Performed by: PSYCHIATRY & NEUROLOGY

## 2019-02-04 PROCEDURE — 95886 MUSC TEST DONE W/N TEST COMP: CPT | Performed by: PSYCHIATRY & NEUROLOGY

## 2019-02-05 ENCOUNTER — OFFICE VISIT (OUTPATIENT)
Dept: PRIMARY CARE CLINIC | Age: 59
End: 2019-02-05
Payer: COMMERCIAL

## 2019-02-05 VITALS
HEIGHT: 72 IN | TEMPERATURE: 96.5 F | HEART RATE: 82 BPM | DIASTOLIC BLOOD PRESSURE: 80 MMHG | SYSTOLIC BLOOD PRESSURE: 124 MMHG | BODY MASS INDEX: 30.61 KG/M2 | OXYGEN SATURATION: 97 % | WEIGHT: 226 LBS

## 2019-02-05 DIAGNOSIS — Z23 NEED FOR SHINGLES VACCINE: ICD-10-CM

## 2019-02-05 DIAGNOSIS — G58.9 ENTRAPMENT NEUROPATHY: ICD-10-CM

## 2019-02-05 DIAGNOSIS — M51.16 LUMBAR DISC DISEASE WITH RADICULOPATHY: ICD-10-CM

## 2019-02-05 DIAGNOSIS — I10 ESSENTIAL HYPERTENSION: Primary | ICD-10-CM

## 2019-02-05 DIAGNOSIS — I10 ESSENTIAL HYPERTENSION: ICD-10-CM

## 2019-02-05 LAB
A/G RATIO: 1.1 (ref 1.1–2.2)
ALBUMIN SERPL-MCNC: 4 G/DL (ref 3.4–5)
ALP BLD-CCNC: 68 U/L (ref 40–129)
ALT SERPL-CCNC: 13 U/L (ref 10–40)
ANION GAP SERPL CALCULATED.3IONS-SCNC: 12 MMOL/L (ref 3–16)
AST SERPL-CCNC: 13 U/L (ref 15–37)
BILIRUB SERPL-MCNC: <0.2 MG/DL (ref 0–1)
BUN BLDV-MCNC: 7 MG/DL (ref 7–20)
CALCIUM SERPL-MCNC: 9.1 MG/DL (ref 8.3–10.6)
CHLORIDE BLD-SCNC: 105 MMOL/L (ref 99–110)
CO2: 23 MMOL/L (ref 21–32)
CREAT SERPL-MCNC: 0.9 MG/DL (ref 0.9–1.3)
GFR AFRICAN AMERICAN: >60
GFR NON-AFRICAN AMERICAN: >60
GLOBULIN: 3.5 G/DL
GLUCOSE BLD-MCNC: 94 MG/DL (ref 70–99)
POTASSIUM SERPL-SCNC: 4.5 MMOL/L (ref 3.5–5.1)
SODIUM BLD-SCNC: 140 MMOL/L (ref 136–145)
TOTAL PROTEIN: 7.5 G/DL (ref 6.4–8.2)

## 2019-02-05 PROCEDURE — G8427 DOCREV CUR MEDS BY ELIG CLIN: HCPCS | Performed by: FAMILY MEDICINE

## 2019-02-05 PROCEDURE — 1036F TOBACCO NON-USER: CPT | Performed by: FAMILY MEDICINE

## 2019-02-05 PROCEDURE — 99214 OFFICE O/P EST MOD 30 MIN: CPT | Performed by: FAMILY MEDICINE

## 2019-02-05 PROCEDURE — G8417 CALC BMI ABV UP PARAM F/U: HCPCS | Performed by: FAMILY MEDICINE

## 2019-02-05 PROCEDURE — G8482 FLU IMMUNIZE ORDER/ADMIN: HCPCS | Performed by: FAMILY MEDICINE

## 2019-02-05 PROCEDURE — 3017F COLORECTAL CA SCREEN DOC REV: CPT | Performed by: FAMILY MEDICINE

## 2019-02-05 RX ORDER — FOLIC ACID 1 MG/1
1 TABLET ORAL DAILY
Refills: 3 | COMMUNITY
Start: 2019-02-01 | End: 2020-09-15

## 2019-02-05 RX ORDER — ACETAMINOPHEN 500 MG
1 TABLET ORAL DAILY PRN
COMMUNITY
Start: 2018-07-11 | End: 2019-04-10

## 2019-02-05 RX ORDER — PANTOPRAZOLE SODIUM 40 MG/1
40 TABLET, DELAYED RELEASE ORAL DAILY
COMMUNITY
Start: 2018-10-24 | End: 2020-08-12

## 2019-02-05 RX ORDER — NORTRIPTYLINE HYDROCHLORIDE 10 MG/1
CAPSULE ORAL
COMMUNITY
Start: 2018-11-11 | End: 2020-09-15

## 2019-02-05 RX ORDER — HYDROXYCHLOROQUINE SULFATE 200 MG/1
200 TABLET, FILM COATED ORAL 2 TIMES DAILY
COMMUNITY
Start: 2019-02-01 | End: 2021-09-27

## 2019-02-05 RX ORDER — OXYCODONE AND ACETAMINOPHEN 7.5; 325 MG/1; MG/1
1 TABLET ORAL DAILY PRN
COMMUNITY
End: 2019-04-10

## 2019-02-05 ASSESSMENT — ENCOUNTER SYMPTOMS
SHORTNESS OF BREATH: 0
EYE DISCHARGE: 0
APNEA: 0
EYE REDNESS: 0
BLOOD IN STOOL: 0
WHEEZING: 0
SINUS PRESSURE: 0
SORE THROAT: 0
FACIAL SWELLING: 0
NAUSEA: 0
VOMITING: 0
CHOKING: 0
ORTHOPNEA: 0
BACK PAIN: 1
CONSTIPATION: 0
ANAL BLEEDING: 0
CHEST TIGHTNESS: 0
EYE PAIN: 0
BLURRED VISION: 0
TROUBLE SWALLOWING: 0
ABDOMINAL PAIN: 0
VOICE CHANGE: 0
RECTAL PAIN: 0
COLOR CHANGE: 0
PHOTOPHOBIA: 0
COUGH: 0
EYE ITCHING: 0

## 2019-02-12 ENCOUNTER — OFFICE VISIT (OUTPATIENT)
Dept: PRIMARY CARE CLINIC | Age: 59
End: 2019-02-12
Payer: COMMERCIAL

## 2019-02-12 VITALS
HEIGHT: 72 IN | OXYGEN SATURATION: 97 % | BODY MASS INDEX: 30.18 KG/M2 | HEART RATE: 109 BPM | TEMPERATURE: 97 F | DIASTOLIC BLOOD PRESSURE: 83 MMHG | SYSTOLIC BLOOD PRESSURE: 139 MMHG | WEIGHT: 222.8 LBS

## 2019-02-12 DIAGNOSIS — Z01.818 PRE-OP EXAM: Primary | ICD-10-CM

## 2019-02-12 DIAGNOSIS — M70.031: ICD-10-CM

## 2019-02-12 DIAGNOSIS — I10 ESSENTIAL HYPERTENSION: ICD-10-CM

## 2019-02-12 DIAGNOSIS — M70.041: ICD-10-CM

## 2019-02-12 DIAGNOSIS — R00.0 SINUS TACHYCARDIA: ICD-10-CM

## 2019-02-12 DIAGNOSIS — K21.9 GASTROESOPHAGEAL REFLUX DISEASE WITHOUT ESOPHAGITIS: ICD-10-CM

## 2019-02-12 PROCEDURE — 93000 ELECTROCARDIOGRAM COMPLETE: CPT | Performed by: FAMILY MEDICINE

## 2019-02-12 PROCEDURE — G8482 FLU IMMUNIZE ORDER/ADMIN: HCPCS | Performed by: FAMILY MEDICINE

## 2019-02-12 PROCEDURE — 99243 OFF/OP CNSLTJ NEW/EST LOW 30: CPT | Performed by: FAMILY MEDICINE

## 2019-02-12 PROCEDURE — G8427 DOCREV CUR MEDS BY ELIG CLIN: HCPCS | Performed by: FAMILY MEDICINE

## 2019-02-12 PROCEDURE — G8417 CALC BMI ABV UP PARAM F/U: HCPCS | Performed by: FAMILY MEDICINE

## 2019-02-12 PROCEDURE — 3017F COLORECTAL CA SCREEN DOC REV: CPT | Performed by: FAMILY MEDICINE

## 2019-02-12 RX ORDER — LISINOPRIL 20 MG/1
20 TABLET ORAL
COMMUNITY
End: 2019-02-22 | Stop reason: SDUPTHER

## 2019-02-12 RX ORDER — NAPROXEN 500 MG/1
TABLET ORAL
COMMUNITY
Start: 2018-09-10 | End: 2019-04-10

## 2019-02-12 RX ORDER — ACETAMINOPHEN 500 MG
500 TABLET ORAL
COMMUNITY
Start: 2018-07-11 | End: 2019-04-10

## 2019-02-12 ASSESSMENT — ENCOUNTER SYMPTOMS
ANAL BLEEDING: 0
SHORTNESS OF BREATH: 0
VOMITING: 0
VOICE CHANGE: 0
BACK PAIN: 0
TROUBLE SWALLOWING: 0
CHOKING: 0
NAUSEA: 0
SORE THROAT: 0
COUGH: 0
SINUS PRESSURE: 0
EYE PAIN: 0
EYE REDNESS: 0
APNEA: 0
WHEEZING: 0
BLOOD IN STOOL: 0
CHEST TIGHTNESS: 0
EYE DISCHARGE: 0
RECTAL PAIN: 0
COLOR CHANGE: 0
FACIAL SWELLING: 0
CONSTIPATION: 0
ABDOMINAL PAIN: 0
EYE ITCHING: 0
PHOTOPHOBIA: 0

## 2019-02-13 DIAGNOSIS — Z01.818 PRE-OP EXAM: ICD-10-CM

## 2019-02-13 DIAGNOSIS — R00.0 SINUS TACHYCARDIA: ICD-10-CM

## 2019-02-13 DIAGNOSIS — I10 ESSENTIAL HYPERTENSION: ICD-10-CM

## 2019-02-13 LAB
HCT VFR BLD CALC: 41.6 % (ref 40.5–52.5)
HEMOGLOBIN: 13.8 G/DL (ref 13.5–17.5)
MCH RBC QN AUTO: 27.2 PG (ref 26–34)
MCHC RBC AUTO-ENTMCNC: 33.3 G/DL (ref 31–36)
MCV RBC AUTO: 81.8 FL (ref 80–100)
PDW BLD-RTO: 15.3 % (ref 12.4–15.4)
PLATELET # BLD: 242 K/UL (ref 135–450)
PMV BLD AUTO: 8.5 FL (ref 5–10.5)
RBC # BLD: 5.08 M/UL (ref 4.2–5.9)
T4 TOTAL: 5.7 UG/DL (ref 4.5–10.9)
TSH SERPL DL<=0.05 MIU/L-ACNC: 3.45 UIU/ML (ref 0.27–4.2)
WBC # BLD: 8.7 K/UL (ref 4–11)

## 2019-02-22 DIAGNOSIS — I10 ESSENTIAL HYPERTENSION: ICD-10-CM

## 2019-02-22 RX ORDER — LISINOPRIL 20 MG/1
20 TABLET ORAL DAILY
Qty: 30 TABLET | Refills: 0 | Status: SHIPPED | OUTPATIENT
Start: 2019-02-22 | End: 2019-03-21 | Stop reason: SDUPTHER

## 2019-03-21 DIAGNOSIS — I10 ESSENTIAL HYPERTENSION: ICD-10-CM

## 2019-03-21 RX ORDER — LISINOPRIL 20 MG/1
20 TABLET ORAL DAILY
Qty: 30 TABLET | Refills: 2 | Status: SHIPPED | OUTPATIENT
Start: 2019-03-21 | End: 2019-07-12 | Stop reason: SDUPTHER

## 2019-04-10 ENCOUNTER — HOSPITAL ENCOUNTER (EMERGENCY)
Age: 59
Discharge: HOME | End: 2019-04-10
Payer: COMMERCIAL

## 2019-04-10 VITALS
RESPIRATION RATE: 16 BRPM | WEIGHT: 215.61 LBS | TEMPERATURE: 97.6 F | SYSTOLIC BLOOD PRESSURE: 120 MMHG | HEIGHT: 77 IN | HEART RATE: 98 BPM | OXYGEN SATURATION: 98 % | DIASTOLIC BLOOD PRESSURE: 69 MMHG | BODY MASS INDEX: 25.46 KG/M2

## 2019-04-10 DIAGNOSIS — L02.416 ABSCESS OF LEFT THIGH: Primary | ICD-10-CM

## 2019-04-10 PROCEDURE — 99282 EMERGENCY DEPT VISIT SF MDM: CPT

## 2019-04-10 PROCEDURE — 4500000022 HC ED LEVEL 2 PROCEDURE

## 2019-04-10 PROCEDURE — 2500000003 HC RX 250 WO HCPCS: Performed by: PHYSICIAN ASSISTANT

## 2019-04-10 RX ORDER — CEPHALEXIN 500 MG/1
500 CAPSULE ORAL 4 TIMES DAILY
Qty: 40 CAPSULE | Refills: 0 | Status: SHIPPED | OUTPATIENT
Start: 2019-04-10 | End: 2019-05-06 | Stop reason: ALTCHOICE

## 2019-04-10 RX ORDER — LIDOCAINE HYDROCHLORIDE AND EPINEPHRINE 10; 10 MG/ML; UG/ML
20 INJECTION, SOLUTION INFILTRATION; PERINEURAL ONCE
Status: COMPLETED | OUTPATIENT
Start: 2019-04-10 | End: 2019-04-10

## 2019-04-10 RX ORDER — SULFAMETHOXAZOLE AND TRIMETHOPRIM 800; 160 MG/1; MG/1
TABLET ORAL
Qty: 40 TABLET | Refills: 0 | Status: SHIPPED | OUTPATIENT
Start: 2019-04-10 | End: 2019-04-12 | Stop reason: ALTCHOICE

## 2019-04-10 RX ORDER — IBUPROFEN 600 MG/1
600 TABLET ORAL EVERY 6 HOURS PRN
Qty: 20 TABLET | Refills: 0 | Status: SHIPPED | OUTPATIENT
Start: 2019-04-10 | End: 2020-08-12 | Stop reason: SINTOL

## 2019-04-10 RX ADMIN — LIDOCAINE HYDROCHLORIDE AND EPINEPHRINE 20 ML: 10; 10 INJECTION, SOLUTION INFILTRATION; PERINEURAL at 10:28

## 2019-04-10 ASSESSMENT — PAIN SCALES - GENERAL
PAINLEVEL_OUTOF10: 8
PAINLEVEL_OUTOF10: 1

## 2019-04-10 ASSESSMENT — PAIN DESCRIPTION - PROGRESSION
CLINICAL_PROGRESSION: NOT CHANGED
CLINICAL_PROGRESSION: RAPIDLY IMPROVING

## 2019-04-10 ASSESSMENT — PAIN DESCRIPTION - LOCATION
LOCATION: LEG
LOCATION: LEG

## 2019-04-10 ASSESSMENT — PAIN DESCRIPTION - ORIENTATION
ORIENTATION: LEFT
ORIENTATION: LEFT

## 2019-04-10 ASSESSMENT — PAIN DESCRIPTION - PAIN TYPE
TYPE: ACUTE PAIN
TYPE: ACUTE PAIN

## 2019-04-10 ASSESSMENT — PAIN DESCRIPTION - FREQUENCY
FREQUENCY: CONTINUOUS
FREQUENCY: CONTINUOUS

## 2019-04-10 ASSESSMENT — PAIN DESCRIPTION - DESCRIPTORS: DESCRIPTORS: ACHING

## 2019-04-10 ASSESSMENT — PAIN - FUNCTIONAL ASSESSMENT: PAIN_FUNCTIONAL_ASSESSMENT: ACTIVITIES ARE NOT PREVENTED

## 2019-04-10 ASSESSMENT — PAIN DESCRIPTION - ONSET: ONSET: SUDDEN

## 2019-04-10 NOTE — ED NOTES
D/C instructions, including RX and follow up care given to pt. Pt verbalized understanding and denies further questions or needs at this time. Ambulatory to waiting room with steady gait. AMY Perez RN  04/10/19 [id number]

## 2019-04-10 NOTE — ED PROVIDER NOTES
Known Allergies    SOCIAL & FAMILY HISTORY    Social History     Socioeconomic History    Marital status: Legally      Spouse name: None    Number of children: None    Years of education: None    Highest education level: None   Occupational History    Occupation: Disabled     Comment: 2014   Social Needs    Financial resource strain: None    Food insecurity:     Worry: None     Inability: None    Transportation needs:     Medical: None     Non-medical: None   Tobacco Use    Smoking status: Never Smoker    Smokeless tobacco: Never Used   Substance and Sexual Activity    Alcohol use: No    Drug use: No    Sexual activity: None   Lifestyle    Physical activity:     Days per week: None     Minutes per session: None    Stress: None   Relationships    Social connections:     Talks on phone: None     Gets together: None     Attends Rastafarian service: None     Active member of club or organization: None     Attends meetings of clubs or organizations: None     Relationship status: None    Intimate partner violence:     Fear of current or ex partner: None     Emotionally abused: None     Physically abused: None     Forced sexual activity: None   Other Topics Concern    None   Social History Narrative    None     Family History   Problem Relation Age of Onset    Cancer Mother     Hypertension Mother     Diabetes Maternal Uncle        PHYSICAL EXAM    VITAL SIGNS: BP (!) 143/87   Pulse 99   Temp 97.6 °F (36.4 °C) (Oral)   Resp 16   Ht 6' 5\" (1.956 m)   Wt 215 lb 9.8 oz (97.8 kg)   SpO2 98%   BMI 25.57 kg/m²   Constitutional:  Well developed, well nourished, no acute distress  HENT:  Atraumatic, no facial or lip swelling  Oral:  No tongue swelling, airway patent  Neck: Supple, no swelling  Respiratory:  No respiratory distress, breathing comfortably  Cardiovascular:  no JVD   Musculoskeletal:  No edema, no acute deformities  Integument:  +5 x 5 cm area of tenderness, induration, and fluctuance located over the left lateral thigh with surrounding erythema    PROCEDURE  Incision and Drainage Procedure Note  Consent:  Verbal  Consent given by:  Patient  Risks discussed:  Pain and incomplete drainage  Alternatives discussed:  Delayed treatment  Location:   Type:  Abscess  Location:  Left thigh  Anesthesia (see MAR for exact dosages): Anesthesia method:  Local infiltration  Local anesthetic:  Lidocaine 1% w epi  Procedure complexity:  Simple  Procedure details:   Incision types:  Single straight  Incision depth:  Subcutaneous  Scalpel blade:  11  Wound management:  Probed and deloculated  Drainage:  Purulent    Drainage amount: Moderate  Wound treatment:  Wound left open  Packing materials: Iodoform gauze  Patient tolerance of procedure: Tolerated well, no immediate complications    ED COURSE & MEDICAL DECISION MAKING    See chart for details of medications prescribed. Vitals:    04/10/19 0913 04/10/19 0939   BP: (!) 143/87    Pulse: 102 99   Resp: 17 16   Temp: 97.6 °F (36.4 °C)    TempSrc: Oral    SpO2: 98% 98%   Weight: 215 lb 9.8 oz (97.8 kg)    Height: 6' 5\" (1.956 m)        Differential diagnosis: necrotizing fasciitis, deep space soft tissue bacterial skin infection, viral rash, systemic infectious rash, aseptic cyst, malignancy, lymphadenopathy, other    Patient is afebrile and nontoxic in appearance. Bactrim prescribed as empiric treatment for possible MRSA etiology. Keflex prescribed for coverage of concomitant cellulitis. I instructed the patient to follow up in 2 days for wound recheck. I instructed the patient to return to the ED immediately for any new or worsening symptoms. The patient verbalized understanding. I have evaluated this patient. My supervising physician was available for consultation. FINAL IMPRESSION    1.  Abscess of left thigh        PLAN  Discharge with close outpatient follow-up (see EMR)       (Please note that this note was completed with a voice

## 2019-04-10 NOTE — ED TRIAGE NOTES
Patient arrived to ED via private vehicle. Patient reports he woke up this AM and noticed a bump on his left upper leg. Patient reports he noticed pus draining from area. Patient c/o pain that increases with ambulation. Area appears raised with small opening of cloudy white puss draining at this time. Patient denies fever or chills. Denies n/v/d. Patient is alert and oriented x4.

## 2019-04-10 NOTE — ED NOTES
I&D completed per JARETH Christian. Dry dressing placed on abscess site left lateral upper thigh region. Pt a & o x 3. Skin w/d, color wnl for ethnicity. resp easy. Hung (=) without deficit. Ambulatory with steady gait. [place] RN  04/10/19 7121

## 2019-04-10 NOTE — ED NOTES
Assumed care of pt at this time. Report rec'd from [place], [place]. Lidocaine with epi removed and given to JARETH Christian.      Efren Rivera RN  04/10/19 1016

## 2019-04-12 ENCOUNTER — OFFICE VISIT (OUTPATIENT)
Dept: PRIMARY CARE CLINIC | Age: 59
End: 2019-04-12
Payer: COMMERCIAL

## 2019-04-12 VITALS
DIASTOLIC BLOOD PRESSURE: 80 MMHG | WEIGHT: 221.8 LBS | OXYGEN SATURATION: 98 % | SYSTOLIC BLOOD PRESSURE: 130 MMHG | BODY MASS INDEX: 26.19 KG/M2 | HEART RATE: 87 BPM | HEIGHT: 77 IN

## 2019-04-12 DIAGNOSIS — M71.052 ABSCESS OF BURSA OF LEFT HIP: Primary | ICD-10-CM

## 2019-04-12 PROCEDURE — 3017F COLORECTAL CA SCREEN DOC REV: CPT | Performed by: FAMILY MEDICINE

## 2019-04-12 PROCEDURE — 1036F TOBACCO NON-USER: CPT | Performed by: FAMILY MEDICINE

## 2019-04-12 PROCEDURE — G8427 DOCREV CUR MEDS BY ELIG CLIN: HCPCS | Performed by: FAMILY MEDICINE

## 2019-04-12 PROCEDURE — G8417 CALC BMI ABV UP PARAM F/U: HCPCS | Performed by: FAMILY MEDICINE

## 2019-04-12 PROCEDURE — 99213 OFFICE O/P EST LOW 20 MIN: CPT | Performed by: FAMILY MEDICINE

## 2019-04-12 ASSESSMENT — ENCOUNTER SYMPTOMS
BACK PAIN: 0
SHORTNESS OF BREATH: 0
EYE PAIN: 0
TROUBLE SWALLOWING: 0
WHEEZING: 0
VOICE CHANGE: 0
ANAL BLEEDING: 0
COUGH: 0
COLOR CHANGE: 0
ABDOMINAL PAIN: 0
BLOOD IN STOOL: 0
FACIAL SWELLING: 0
EYE DISCHARGE: 0
EYE ITCHING: 0
NAUSEA: 0
SORE THROAT: 0
RECTAL PAIN: 0
VOMITING: 0
CONSTIPATION: 0
CHEST TIGHTNESS: 0
APNEA: 0
SINUS PRESSURE: 0
PHOTOPHOBIA: 0
EYE REDNESS: 0
CHOKING: 0

## 2019-04-12 NOTE — PROGRESS NOTES
and well-nourished. Cardiovascular: Normal rate, regular rhythm, normal heart sounds and intact distal pulses. Exam reveals no friction rub. No murmur heard. Pulmonary/Chest: Effort normal and breath sounds normal. No stridor. No respiratory distress. He has no wheezes. He has no rales. He exhibits no tenderness. Abdominal: Soft. Bowel sounds are normal. He exhibits no distension and no mass. There is no tenderness. There is no guarding. Skin: Skin is warm. S/p left lateral hip I&D  Drain removal  Sterile dressing applied   Nursing note and vitals reviewed. Assessment:          1.  Abscess of bursa of left hip  Drain removed  Sterile dressing  Cleanse daily with dial soap           Plan:              Allyson Saldaña MD

## 2019-05-06 ENCOUNTER — OFFICE VISIT (OUTPATIENT)
Dept: PRIMARY CARE CLINIC | Age: 59
End: 2019-05-06
Payer: COMMERCIAL

## 2019-05-06 VITALS
HEIGHT: 77 IN | SYSTOLIC BLOOD PRESSURE: 134 MMHG | BODY MASS INDEX: 25.81 KG/M2 | OXYGEN SATURATION: 99 % | DIASTOLIC BLOOD PRESSURE: 85 MMHG | WEIGHT: 218.6 LBS | HEART RATE: 79 BPM

## 2019-05-06 DIAGNOSIS — M65.4 DE QUERVAIN'S TENOSYNOVITIS: ICD-10-CM

## 2019-05-06 DIAGNOSIS — Z12.11 SCREEN FOR COLON CANCER: ICD-10-CM

## 2019-05-06 DIAGNOSIS — I10 ESSENTIAL HYPERTENSION: ICD-10-CM

## 2019-05-06 DIAGNOSIS — I10 ESSENTIAL HYPERTENSION: Primary | ICD-10-CM

## 2019-05-06 DIAGNOSIS — M19.031 ARTHRITIS OF RIGHT WRIST: ICD-10-CM

## 2019-05-06 LAB
A/G RATIO: 1.2 (ref 1.1–2.2)
ALBUMIN SERPL-MCNC: 4.2 G/DL (ref 3.4–5)
ALP BLD-CCNC: 72 U/L (ref 40–129)
ALT SERPL-CCNC: 15 U/L (ref 10–40)
ANION GAP SERPL CALCULATED.3IONS-SCNC: 13 MMOL/L (ref 3–16)
AST SERPL-CCNC: 18 U/L (ref 15–37)
BILIRUB SERPL-MCNC: 0.4 MG/DL (ref 0–1)
BUN BLDV-MCNC: 9 MG/DL (ref 7–20)
CALCIUM SERPL-MCNC: 9.7 MG/DL (ref 8.3–10.6)
CHLORIDE BLD-SCNC: 102 MMOL/L (ref 99–110)
CO2: 24 MMOL/L (ref 21–32)
CONTROL: NORMAL
CREAT SERPL-MCNC: 1.2 MG/DL (ref 0.9–1.3)
GFR AFRICAN AMERICAN: >60
GFR NON-AFRICAN AMERICAN: >60
GLOBULIN: 3.5 G/DL
GLUCOSE BLD-MCNC: 98 MG/DL (ref 70–99)
HEMOCCULT STL QL: NEGATIVE
POTASSIUM SERPL-SCNC: 4.4 MMOL/L (ref 3.5–5.1)
SODIUM BLD-SCNC: 139 MMOL/L (ref 136–145)
TOTAL PROTEIN: 7.7 G/DL (ref 6.4–8.2)

## 2019-05-06 PROCEDURE — 3017F COLORECTAL CA SCREEN DOC REV: CPT | Performed by: FAMILY MEDICINE

## 2019-05-06 PROCEDURE — G8427 DOCREV CUR MEDS BY ELIG CLIN: HCPCS | Performed by: FAMILY MEDICINE

## 2019-05-06 PROCEDURE — 82274 ASSAY TEST FOR BLOOD FECAL: CPT | Performed by: FAMILY MEDICINE

## 2019-05-06 PROCEDURE — 99214 OFFICE O/P EST MOD 30 MIN: CPT | Performed by: FAMILY MEDICINE

## 2019-05-06 PROCEDURE — 1036F TOBACCO NON-USER: CPT | Performed by: FAMILY MEDICINE

## 2019-05-06 PROCEDURE — G8417 CALC BMI ABV UP PARAM F/U: HCPCS | Performed by: FAMILY MEDICINE

## 2019-05-06 RX ORDER — AMOXICILLIN 500 MG/1
TABLET, FILM COATED ORAL
COMMUNITY
Start: 2019-05-05 | End: 2019-08-12 | Stop reason: ALTCHOICE

## 2019-05-06 RX ORDER — HYDROCODONE BITARTRATE AND ACETAMINOPHEN 5; 325 MG/1; MG/1
TABLET ORAL
COMMUNITY
Start: 2019-05-05 | End: 2020-09-15

## 2019-05-06 ASSESSMENT — ENCOUNTER SYMPTOMS
ORTHOPNEA: 0
WHEEZING: 0
EYE REDNESS: 0
SORE THROAT: 0
SHORTNESS OF BREATH: 0
EYE DISCHARGE: 0
VOMITING: 0
BLOOD IN STOOL: 0
CHOKING: 0
TROUBLE SWALLOWING: 0
NAUSEA: 0
EYE PAIN: 0
FACIAL SWELLING: 0
PHOTOPHOBIA: 0
ABDOMINAL PAIN: 0
CONSTIPATION: 0
SINUS PRESSURE: 0
VOICE CHANGE: 0
BACK PAIN: 0
COUGH: 0
RECTAL PAIN: 0
CHEST TIGHTNESS: 0
ANAL BLEEDING: 0
EYE ITCHING: 0
BLURRED VISION: 0
COLOR CHANGE: 0
APNEA: 0

## 2019-05-06 NOTE — LETTER
4/5/2019  [facility] Arthritis at [facility]  · [facility]  Location   Jump to Section ? Discontinued MedicationsDocument InformationEncounter DetailsInstructionsLab ResultsLast Filed Vital SignsOrdered PrescriptionsPatient ContactsPatient DemographicsPlan of TreatmentProgress NotesReason for [employer] for VisitSocial HistorySource CommentsVisit Diagnoses  Printout Information    Document Contents Document Received Date Document Source Organization   Office Visit May 06, 2019May 06, 2019 [address] [facility],[address] - 63 y.o. Male; born [birth year] Encounter Summary, generated on May 06, 2019May 06, 2019   Source Comments  - [facility]  This information has been disclosed to you from confidential records protectfrom disclosure by state law. You shall make no further disclosure of thisinformation without the specific, written, and informed release of theindividual to whom it pertains, or as otherwise permitted by law. A generalauthorization for the release of medical or other information is not sufficientfor the purposes of the release of HIV test results or diagnoses.  [id number]    Reason for Referral    Consultation (Routine)  Consultation (Routine)   Status Reason Specialty Diagnoses / Procedures Referred By Contact Referred To Contact   Authorized   [facility] Infectious Diseases Diagnoses    Swollen wrist, right     Eitan MD [address]   [place], [address]   Phone: [phone]    Fax: [phone]          Consultation (Routine)   Scheduling Instructions   For appointments, please call [phone].            Reason for Visit    Reason Comments   Joint Pain     Arthritis         Encounter Details    Date Type Department Care Team Description   04/05/2019 Office Visit [facility] Arthritis at 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); Swollen wrist, right; Inflammatory arthritis; Wrist arthritis;   SS-A antibody positive;   SS-B antibody positive;   DDD (degenerative disc disease), lumbar     Social History  - as of this encounter  Tobacco Use Types Packs/Day Years Used Date   Never Smoker           Smokeless Tobacco: Never Used           Alcohol Use Drinks/Week oz/Week Comments   No     None since-2015 drinks only once a month or less     Sex Assigned at Birth Date Recorded   Not on file       Last Filed Vital Signs  - in this encounter  Vital Sign Reading Time Taken   Blood Pressure 125/88 04/05/2019 8:13 AM EDT   Pulse 103 04/05/2019 8:13 AM EDT   Temperature - -   Respiratory Rate - -   Oxygen Saturation - -   Inhaled Oxygen Concentration - -   Weight 101.1 kg (222 lb 12.8 oz) 04/05/2019 8:13 AM EDT   Height 195.6 cm (6' 5\") 04/05/2019 8:13 AM EDT   Body Mass Index 26.42 04/05/2019 8:13 AM EDT     Instructions  - in this encounter  Patient Instructions - Orlin Urbano MD - 04/05/2019 8:30 AM EDT  - increase prednisone to 30 mg for 1 week, then 20 mg for 1 week, then 10 mg and stay there until follow up.  - increase the allopurinol to 200 mg daily after waiting another 2 weeks  - decrease methotrexate to 10 mg weekly (4 tabs), continue folic acid 1 tab daily  - continue plaquenil twice daily  - continue colchicine 1 tab daily  - get labs today  - call to schedule your appointment with infectious disease  - see us back in 1 month            Ordered Prescriptions  - in this encounter  Prescription Sig. Disp. Refills Start Date End Date   predniSONE (DELTASONE) 10 MG tablet   Take 30 mg daily for 1 week, then 20 mg daily for 1 week, then 10 mg daily and remain there until follow up.  120 tablet   1 04/09/2019   hydroxychloroquine (PLAQUENIL) 200 mg tablet   Take 1 tablet (200 mg total) by mouth 2 times a day. 60 tablet   2 31/31/7574     folic acid (FOLVITE) 1 MG tablet   Take 1 tablet (1 mg total) by mouth daily. 30 tablet   3 04/05/2019     allopurinol (ZYLOPRIM) 100 MG tablet   Take 2 tablets (200 mg total) by mouth daily. 60 tablet   2 04/05/2019     methotrexate 2.5 MG tablet    Indications: rheumatoid arthritis Take 4 tablets (10 mg total) by mouth Every Sunday. Indications: rheumatoid arthritis 16 tablet   1 04/07/2019     predniSONE (DELTASONE) 10 MG tablet   Take 1 tablet (10 mg total) by mouth daily. 30 tablet   2 04/05/2019 04/09/2019   predniSONE (DELTASONE) 10 MG tablet   Take 30 mg daily for 7 days, then 20 mg daily for 7 days, then 10 mg and remain until follow up. 120 tablet   1 04/05/2019 04/05/2019     Progress Notes  - in this encounter  Table of Contents for Progress Notes   Deshaun Kim MD - 04/05/2019 8:30 AM EDT   Seth Nuñez MD - 04/05/2019 8:30 AM EDT      Deshaun Kim MD - 04/05/2019 8:30 AM EDT  Rheumatology Attending Note:    I saw and independently examined the patient today, and discussed the management with the rheumatology fellow- Dr. Misha Smith . Review of labs, pathology reports, radiograph reports, and medical records confirm the findings noted below. I reviewed the fellow's note and agree with the documented findings and plan of care. Patient is a pleasant 63year old gentleman, who presented to the clinic for follow up visit(previously followed by Dr. Sacha Mack). Has history of inflammatory arthritis with involvement of wrist, knees, ankles. TISHA, SSA, SSB positive. No associated features of connective tissue disease. Has been on plaquenil 200 mg twice a day. Last visits, we did right wrist injection, on 2 different visits, in clinic. Patient had subtle improvement. Subsequently, started on burst and taper of prednisone- has improved some. Today- still has swollen and tender right wrist with decreased range of motion. 03/2019: DECT: showed extensive uric acid deposition and erosive disease    Clinical exam:Right writst- slight swelling noted on the radial aspect; finkelstein's test positive. Ultrasound evaluation of the right wrist done in clinic, at previous visit: positive right wrist synovitis; Positive double contour sign, 1st compartment tendon sheath swollen and strong positive doppler signal, consistent with tenosynovitis (DeQuervain's )    Also has chronic back pain; history of laminectomy. MRI WRIST: 1/29/2019  IMPRESSION:  1. Extensive active synovitis throughout the distal radioulnar joint, radiocarpal joint, intercarpal joints, and carpometacarpal joints with associated periarticular erosions, in keeping with patient's history of inflammatory arthropathy.     2. Ligamentous injuries involving the lunotriquetral ligament, TFCC, and scapholunate ligament with widening of the scapholunate interval.       Impression:    #. Inflammatory arthritis- right wrist. D/D: seronegative RA, plus Crystalline arthritis: gout     #. DeQuervain's tenosynovitis with strong positive doppler signal.    #. Positive autoimmune serologies. #. DJD lumbosacral spine    #. Chronic low back pain    Recommendation:    #. Chronic right wrist inflammatory arthritis: currently treating as RA plus gout    #. Prednisone burst and taper  #. INCREASE Allopurinol to 200 mg daily, after 2 weeks; continue colchicine daily for gout prophylaxis. Uric acid check today. #. Decrease methotrexate to 10 mg weekly--> will plan to discontinue if arthritis is well controlled with the hyperuricemia management. Also, takes plaquenil. #. DJD LS spine.  -Continue gabapentin. -PT  -Could consider lyrica and pain medicine referral in the future. #. Follow up in 4-6 weeks.     Guera An MD     Division of Rheumatology and immunology [address] [address] [address], [address]  Phone: [phone]  Fax: [phone]         Back to top of Progress Notes  Patrizia Reyes MD - 04/05/2019 8:30 AM EDT  Formatting of this note may be different from the original.  Rheumatology follow up    Problem List:   # Gout- right wrist joint and associated tendons  # Tenosynovitis  # DDD back s/p fusion    History of Present Illness:  Mr. Param Fletcher is a 63 y.o. with pmh of gout who is here for follow up. # Seronegative Inflammatory arthritis  - He previously followed with Dr. Theresa Mcbride, last seen 2015 and then moved to [place] where he did not have a rheumatologist but did have a PCP who wrote meds for him. - When he established with me he had a swollen wrist that did not respond well to injection of the tendon or the joint; he was placed on steroids PO and did have some improvement, but then continued to have tenosynovitis and some lesser swelling in his joint space  - We obtained MRI of the wrist which showed active synovitis and erosions and some ligamentous injuries  - Due to his continued swelling in his wrist and these MRI findings, we sent him for synovial biopsy which was benign tissue with multi-nucleated giant cells and mild chronic inflammation  - He was also simultaneously started on MTX  - He then underwent DECT scan which showed extensive uric acid deposits in the area, and he was started on allopurinol, colchicine, and continued on prednisone  - with regard to his positive serologies, his IFA was negative both times it was checked in our system, but his screen remained positive. He has a persistently positive SSA.     Current meds:  Allopurinol 100 mg daily  Colchicine 0.6 mg daily  Prednisone 10 mg daily  Plaquenil 200 bid  MTX 20 mg weekly + FA  Naprosyn prn  Gabapentin 600 bid    Previous Meds:  Indocin  mobic    Interval/subjective: - DECT scan showed uric acid deposition, started on gout meds  - right wrist continues to be swollen and painful, but he does have some mild improvement  - no other issues including SOB, infections    Review of Systems:   10 point ROS conducted and negative except for what is stated above    MEDICATIONS:  Current Outpatient Prescriptions on File Prior to Visit   Medication Sig Dispense Refill    acetaminophen (TYLENOL) 500 MG tablet Take 500 mg by mouth every 4 hours as needed.  acetaminophen (TYLENOL) 500 MG tablet Take by mouth.  cyclobenzaprine (FLEXERIL) 10 MG tablet Take 1 tablet (10 mg total) by mouth at bedtime as needed for Muscle spasms. 30 tablet 1    gabapentin (NEURONTIN) 600 MG tablet Take 1 tablet (600 mg total) by mouth 3 times a day. 90 tablet 4    lisinopril (PRINIVIL,ZESTRIL) 20 MG tablet Take 20 mg by mouth daily.  lisinopril (PRINIVIL,ZESTRIL) 20 MG tablet Take by mouth.  naproxen (EC NAPROSYN) 500 MG EC tablet Take by mouth 2 times a day with meals.  naproxen (EC NAPROSYN) 500 MG EC tablet Take by mouth.  nortriptyline (PAMELOR) 10 MG capsule TK 1 TO 2 CS PO AT NIGHT AS TOLERATED    omega-3 fatty acids-fish oil 360-1,200 mg Cap TAKE 1 CAPSULE BY MOUTH ONCE DAILY 30 capsule 11    oxyCODONE-acetaminophen (PERCOCET) 7.5-325 mg per tablet Take by mouth.  pantoprazole (PROTONIX) 40 MG tablet Take by mouth.  SHINGRIX, PF, 50 mcg/0.5 mL SusR vaccine Inject 0.5 mLs into the muscle See Admin Instructions. 0     No current facility-administered medications on file prior to visit. HISTORIES:  Medical:   Past Medical History:   Diagnosis Date    Arthritis   Rheumatoid Arthritis - back,joints    Back pain    Chronic low back pain    Hypercholesteremia    Hypertension   controlled       Surgical: has a past surgical history that includes release trigger finger (Right, 2008); Colonoscopy; Back surgery; and hand procedure soft tissue (Right, 2/14/2019). May have had symptoms for years, previously called seronegative inflammatory arthritis. Had synovial biopsy of wrist which showed inflammation and multinucleated giant cells. Uric acid has increased over time. Also with P. Acnes in anaerobic culture from synovial biopsy, so he will be seeing ID to rule out infection, though probably contaminant. Plan:  - Uric acid improved but not at goal--> increase allopurinol to 200 mg daily in 2 weeks time in order to give his flare some time to calm down (goal uric acid <5.0 for tophaceous gout)  - Continue colchicine 0.6 mg daily for ppx  - Prednisone burst and taper for swellin mg for 1 week, then 20 mg for 1 week, then 10 mg and remain until follow up  - I do not think MTX is helping much (and this likely all gout) so will begin backing back down on the MTX to 10 mg weekly, with the intention of removing it once things are stable   - continue plaquenil 200 mg BID for now    # Back pain with Sciatica down left leg  Had fusion surgery in [address] [place] [address] 2018. Has persistent pain shooting from the back down the left leg for months. Everything still in place per recent x-ray. Plan:  - PT  - continue gabapentin  - following with pain medicine  - considered referral to spine surgery here but not sure if anything could be done as he is already fused; best option is to probably continue with pain doctors, will defer to them    # DJD knees  - stable, conservative therapy    RTC 1 month. Pt was seen and discussed with Dr. Ashley Baeza.     Martha Mitchell  Rheumatology Fellow      Back to top of Progress Notes

## 2019-05-06 NOTE — PROGRESS NOTES
Subjective:      Patient ID: Cam Sterling is a 63 y.o. male. Hypertension   This is a chronic problem. The current episode started more than 1 year ago. The problem is controlled. Pertinent negatives include no anxiety, blurred vision, chest pain, headaches, malaise/fatigue, neck pain, orthopnea, palpitations, PND, shortness of breath or sweats. Agents associated with hypertension include NSAIDs. Past treatments include ACE inhibitors. The current treatment provides significant improvement. There are no compliance problems. There is no history of angina, kidney disease, CAD/MI, CVA, heart failure, left ventricular hypertrophy, PVD or retinopathy. There is no history of chronic renal disease, coarctation of the aorta, hyperaldosteronism, hypercortisolism, hyperparathyroidism, a hypertension causing med, pheochromocytoma, renovascular disease, sleep apnea or a thyroid problem. This patient also has inflammatory arthritis involving the R wrist, R & L knees , and ankles, pos SSA, SSB, TISHA, De   quervain disease of the R wrist, gouty arthritis R wrist,  DDD lumbar spine(low back pain) followed per rheumatology  rheumatologist Dr Abebe Huber MD , on multiple meds    Patient Active Problem List   Diagnosis    Chest pain    Abnormal stress test    TISHA positive    DDD (degenerative disc disease), cervical    DDD (degenerative disc disease), lumbar    Depression    Helicobacter pylori infection    Hand arthritis    Hypertension    Hypertriglyceridemia    Left arm numbness    Left arm weakness    Left low back pain    Neck pain on left side    Right wrist pain    SS-A antibody positive    SS-B antibody positive    Postlaminectomy syndrome, lumbar    H/O: substance abuse    Chronic pain syndrome         Review of Systems   Constitutional: Negative for activity change, appetite change, chills, diaphoresis, fatigue, fever, malaise/fatigue and unexpected weight change.    HENT: Negative for congestion, dental problem, drooling, ear discharge, ear pain, facial swelling, hearing loss, mouth sores, nosebleeds, postnasal drip, sinus pressure, sneezing, sore throat, tinnitus, trouble swallowing and voice change. Eyes: Negative for blurred vision, photophobia, pain, discharge, redness, itching and visual disturbance. Respiratory: Negative for apnea, cough, choking, chest tightness, shortness of breath and wheezing. Cardiovascular: Negative for chest pain, palpitations, orthopnea, leg swelling and PND. Gastrointestinal: Negative for abdominal pain, anal bleeding, blood in stool, constipation, nausea, rectal pain and vomiting. Genitourinary: Negative for decreased urine volume, difficulty urinating, discharge, dysuria, enuresis, flank pain, frequency, hematuria, penile swelling, scrotal swelling, testicular pain and urgency. Musculoskeletal: Negative for arthralgias, back pain, gait problem, joint swelling, myalgias, neck pain and neck stiffness. Skin: Negative for color change, pallor, rash and wound. Neurological: Negative for dizziness, tremors, seizures, syncope, facial asymmetry, speech difficulty, weakness, light-headedness, numbness and headaches. Hematological: Negative for adenopathy. Does not bruise/bleed easily. Psychiatric/Behavioral: Negative for agitation, behavioral problems, confusion, decreased concentration, dysphoric mood, hallucinations, self-injury, sleep disturbance and suicidal ideas. The patient is not nervous/anxious and is not hyperactive. Objective:   Physical Exam   Constitutional: He is oriented to person, place, and time. He appears well-developed and well-nourished. No distress. HENT:   Head: Normocephalic and atraumatic. Right Ear: External ear normal.   Left Ear: External ear normal.   Nose: Nose normal.   Mouth/Throat: Oropharynx is clear and moist. No oropharyngeal exudate. Eyes: Pupils are equal, round, and reactive to light.  Conjunctivae and EOM are normal. Right eye exhibits no discharge. Left eye exhibits no discharge. No scleral icterus. Neck: Normal range of motion. Neck supple. No JVD present. No tracheal deviation present. No thyromegaly present. Cardiovascular: Normal rate, regular rhythm, normal heart sounds and intact distal pulses. Exam reveals no friction rub. No murmur heard. Pulses:       Carotid pulses are 2+ on the right side, and 2+ on the left side. Radial pulses are 2+ on the right side, and 2+ on the left side. Femoral pulses are 2+ on the right side, and 2+ on the left side. Popliteal pulses are 2+ on the right side, and 2+ on the left side. Dorsalis pedis pulses are 2+ on the right side, and 2+ on the left side. Posterior tibial pulses are 2+ on the right side, and 2+ on the left side. Pulmonary/Chest: Effort normal and breath sounds normal. No stridor. No respiratory distress. He has no wheezes. He has no rales. He exhibits no tenderness. Abdominal: Soft. Bowel sounds are normal. He exhibits no distension and no mass. There is no tenderness. There is no rebound and no guarding. Musculoskeletal: Normal range of motion. He exhibits no edema or tenderness. Minimal swelling of involved joints at this time   Lymphadenopathy:     He has no cervical adenopathy. Neurological: He is oriented to person, place, and time. He displays normal reflexes. No cranial nerve deficit. He exhibits normal muscle tone. Coordination normal.   Skin: Skin is warm and dry. No rash noted. He is not diaphoretic. No pallor. Psychiatric: He has a normal mood and affect. His behavior is normal. Judgment and thought content normal.   Nursing note and vitals reviewed. Assessment:      1. Essential hypertension    - Comprehensive Metabolic Panel; Future    2. Arthritis of right wrist    - Comprehensive Metabolic Panel; Future    3.  Screen for colon cancer    - POCT Fecal Immunochemical Test (FIT); Future    3 months        Plan:              Eduin Guillen MD

## 2019-07-12 DIAGNOSIS — I10 ESSENTIAL HYPERTENSION: ICD-10-CM

## 2019-07-13 RX ORDER — LISINOPRIL 20 MG/1
20 TABLET ORAL DAILY
Qty: 30 TABLET | Refills: 0 | Status: SHIPPED | OUTPATIENT
Start: 2019-07-13 | End: 2019-07-15 | Stop reason: SDUPTHER

## 2019-07-15 DIAGNOSIS — I10 ESSENTIAL HYPERTENSION: ICD-10-CM

## 2019-07-15 RX ORDER — LISINOPRIL 20 MG/1
20 TABLET ORAL DAILY
Qty: 30 TABLET | Refills: 0 | Status: SHIPPED | OUTPATIENT
Start: 2019-07-15 | End: 2019-07-15 | Stop reason: SDUPTHER

## 2019-07-17 RX ORDER — LISINOPRIL 20 MG/1
20 TABLET ORAL DAILY
Qty: 90 TABLET | Refills: 0 | Status: SHIPPED | OUTPATIENT
Start: 2019-07-17 | End: 2019-10-11 | Stop reason: SDUPTHER

## 2019-08-12 ENCOUNTER — OFFICE VISIT (OUTPATIENT)
Dept: PRIMARY CARE CLINIC | Age: 59
End: 2019-08-12
Payer: COMMERCIAL

## 2019-08-12 VITALS
HEART RATE: 97 BPM | OXYGEN SATURATION: 99 % | SYSTOLIC BLOOD PRESSURE: 128 MMHG | WEIGHT: 204.4 LBS | BODY MASS INDEX: 24.13 KG/M2 | DIASTOLIC BLOOD PRESSURE: 84 MMHG | HEIGHT: 77 IN

## 2019-08-12 DIAGNOSIS — M10.9 GOUTY ARTHRITIS: ICD-10-CM

## 2019-08-12 DIAGNOSIS — M10.9 GOUTY ARTHRITIS: Primary | ICD-10-CM

## 2019-08-12 DIAGNOSIS — I10 ESSENTIAL HYPERTENSION: ICD-10-CM

## 2019-08-12 LAB — URIC ACID, SERUM: 8.7 MG/DL (ref 3.5–7.2)

## 2019-08-12 PROCEDURE — 99213 OFFICE O/P EST LOW 20 MIN: CPT | Performed by: FAMILY MEDICINE

## 2019-08-12 RX ORDER — COLCHICINE 0.6 MG/1
0.6 TABLET ORAL DAILY
Qty: 30 TABLET | Refills: 2 | Status: SHIPPED | OUTPATIENT
Start: 2019-08-12 | End: 2020-02-12 | Stop reason: SDUPTHER

## 2019-08-12 RX ORDER — ALLOPURINOL 100 MG/1
TABLET ORAL
Refills: 2 | COMMUNITY
Start: 2019-06-01 | End: 2019-08-12 | Stop reason: SDUPTHER

## 2019-08-12 RX ORDER — ALLOPURINOL 100 MG/1
100 TABLET ORAL DAILY
Qty: 30 TABLET | Refills: 2 | Status: SHIPPED | OUTPATIENT
Start: 2019-08-12 | End: 2019-08-12 | Stop reason: SDUPTHER

## 2019-08-12 RX ORDER — COLCHICINE 0.6 MG/1
0.6 TABLET ORAL
COMMUNITY
Start: 2019-06-13 | End: 2019-08-12 | Stop reason: SDUPTHER

## 2019-08-12 ASSESSMENT — ENCOUNTER SYMPTOMS
BLOOD IN STOOL: 0
BACK PAIN: 0
COUGH: 0
SINUS PRESSURE: 0
SORE THROAT: 0
CHEST TIGHTNESS: 0
CHOKING: 0
EYE ITCHING: 0
VOICE CHANGE: 0
VOMITING: 0
PHOTOPHOBIA: 0
EYE PAIN: 0
SHORTNESS OF BREATH: 0
TROUBLE SWALLOWING: 0
EYE REDNESS: 0
APNEA: 0
COLOR CHANGE: 0
RECTAL PAIN: 0
NAUSEA: 0
FACIAL SWELLING: 0
ANAL BLEEDING: 0
EYE DISCHARGE: 0
ABDOMINAL PAIN: 0
CONSTIPATION: 0
WHEEZING: 0

## 2019-08-12 NOTE — PROGRESS NOTES
Abdominal: Soft. Bowel sounds are normal. He exhibits no distension and no mass. There is no tenderness. There is no rebound and no guarding. Musculoskeletal: Normal range of motion. He exhibits no edema or tenderness. Lymphadenopathy:     He has no cervical adenopathy. Neurological: He is oriented to person, place, and time. He displays normal reflexes. No cranial nerve deficit. He exhibits normal muscle tone. Coordination normal.   Skin: Skin is warm and dry. No rash noted. He is not diaphoretic. No pallor. Psychiatric: He has a normal mood and affect. His behavior is normal. Judgment and thought content normal.   Nursing note and vitals reviewed. Lab Results   Component Value Date    CREATININE 1.2 05/06/2019    BUN 9 05/06/2019     05/06/2019    K 4.4 05/06/2019     05/06/2019    CO2 24 05/06/2019     Lab Results   Component Value Date    LABURIC 8.4 (H) 09/10/2018       Assessment:      1. Gouty arthritis    Last uric acid level high  Ran out of allopurinol  No recent attack, has fu with rheumatologist later this month  Wants refill  - allopurinol (ZYLOPRIM) 100 MG tablet; Take 1 tablet by mouth daily  Dispense: 30 tablet; Refill: 2  - colchicine (COLCRYS) 0.6 MG tablet; Take 1 tablet by mouth daily  Dispense: 30 tablet; Refill: 2  Patient is compliant with medications, denies side effects,provides adequate relief. No new symptoms or problem noted by patient today. Patient's problem is noted, no change  Since last visit. Will monitor labs as needed. Will refill as appropriate . Patient has been counseled today and will continue current plans. Repeat uric acid level  Low purine diet  2. Essential hypertension  BP is at goal <140/90. Will continue current medication and low salt diet. Has had recent renal function testing  Low salt diet  Patient is compliant with medications, denies side effects,provides adequate relief. No new symptoms or problem noted by patient today.   Patient's

## 2019-08-14 RX ORDER — ALLOPURINOL 100 MG/1
100 TABLET ORAL DAILY
Qty: 90 TABLET | Refills: 2 | Status: SHIPPED | OUTPATIENT
Start: 2019-08-14 | End: 2019-08-18 | Stop reason: DRUGHIGH

## 2019-08-18 RX ORDER — ALLOPURINOL 100 MG/1
200 TABLET ORAL DAILY
Qty: 60 TABLET | Refills: 5 | Status: SHIPPED | OUTPATIENT
Start: 2019-08-18 | End: 2019-11-12 | Stop reason: DRUGHIGH

## 2019-10-11 DIAGNOSIS — I10 ESSENTIAL HYPERTENSION: ICD-10-CM

## 2019-10-12 RX ORDER — LISINOPRIL 20 MG/1
20 TABLET ORAL DAILY
Qty: 90 TABLET | Refills: 0 | Status: SHIPPED | OUTPATIENT
Start: 2019-10-12 | End: 2020-01-09

## 2019-11-12 ENCOUNTER — OFFICE VISIT (OUTPATIENT)
Dept: PRIMARY CARE CLINIC | Age: 59
End: 2019-11-12
Payer: MEDICARE

## 2019-11-12 VITALS
SYSTOLIC BLOOD PRESSURE: 130 MMHG | WEIGHT: 207.2 LBS | BODY MASS INDEX: 24.46 KG/M2 | HEIGHT: 77 IN | HEART RATE: 107 BPM | DIASTOLIC BLOOD PRESSURE: 80 MMHG | OXYGEN SATURATION: 98 %

## 2019-11-12 DIAGNOSIS — Z12.5 SCREENING FOR PROSTATE CANCER: ICD-10-CM

## 2019-11-12 DIAGNOSIS — M10.9 GOUTY ARTHRITIS: ICD-10-CM

## 2019-11-12 DIAGNOSIS — I10 ESSENTIAL HYPERTENSION: ICD-10-CM

## 2019-11-12 DIAGNOSIS — M10.9 GOUTY ARTHRITIS: Primary | ICD-10-CM

## 2019-11-12 DIAGNOSIS — Z23 NEED FOR INFLUENZA VACCINATION: ICD-10-CM

## 2019-11-12 LAB
A/G RATIO: 1.3 (ref 1.1–2.2)
ALBUMIN SERPL-MCNC: 4.4 G/DL (ref 3.4–5)
ALP BLD-CCNC: 76 U/L (ref 40–129)
ALT SERPL-CCNC: 8 U/L (ref 10–40)
ANION GAP SERPL CALCULATED.3IONS-SCNC: 14 MMOL/L (ref 3–16)
AST SERPL-CCNC: 13 U/L (ref 15–37)
BILIRUB SERPL-MCNC: 0.5 MG/DL (ref 0–1)
BUN BLDV-MCNC: 7 MG/DL (ref 7–20)
CALCIUM SERPL-MCNC: 9.4 MG/DL (ref 8.3–10.6)
CHLORIDE BLD-SCNC: 105 MMOL/L (ref 99–110)
CHOLESTEROL, TOTAL: 172 MG/DL (ref 0–199)
CO2: 22 MMOL/L (ref 21–32)
CREAT SERPL-MCNC: 0.9 MG/DL (ref 0.9–1.3)
GFR AFRICAN AMERICAN: >60
GFR NON-AFRICAN AMERICAN: >60
GLOBULIN: 3.3 G/DL
GLUCOSE BLD-MCNC: 93 MG/DL (ref 70–99)
HDLC SERPL-MCNC: 42 MG/DL (ref 40–60)
LDL CHOLESTEROL CALCULATED: 102 MG/DL
POTASSIUM SERPL-SCNC: 4 MMOL/L (ref 3.5–5.1)
PROSTATE SPECIFIC ANTIGEN: 0.69 NG/ML (ref 0–4)
SODIUM BLD-SCNC: 141 MMOL/L (ref 136–145)
TOTAL PROTEIN: 7.7 G/DL (ref 6.4–8.2)
TRIGL SERPL-MCNC: 139 MG/DL (ref 0–150)
URIC ACID, SERUM: 9.7 MG/DL (ref 3.5–7.2)
VLDLC SERPL CALC-MCNC: 28 MG/DL

## 2019-11-12 PROCEDURE — 99213 OFFICE O/P EST LOW 20 MIN: CPT | Performed by: FAMILY MEDICINE

## 2019-11-12 PROCEDURE — G0008 ADMIN INFLUENZA VIRUS VAC: HCPCS | Performed by: FAMILY MEDICINE

## 2019-11-12 PROCEDURE — 90686 IIV4 VACC NO PRSV 0.5 ML IM: CPT | Performed by: FAMILY MEDICINE

## 2019-11-12 RX ORDER — ALLOPURINOL 100 MG/1
200 TABLET ORAL DAILY
Qty: 60 TABLET | Refills: 5 | Status: SHIPPED | OUTPATIENT
Start: 2019-11-12 | End: 2020-02-12 | Stop reason: SDUPTHER

## 2019-11-12 ASSESSMENT — ENCOUNTER SYMPTOMS
COUGH: 0
SORE THROAT: 0
WHEEZING: 0
VOMITING: 0
BLOOD IN STOOL: 0
RECTAL PAIN: 0
EYE DISCHARGE: 0
SHORTNESS OF BREATH: 0
EYE ITCHING: 0
SINUS PRESSURE: 0
EYE REDNESS: 0
NAUSEA: 0
APNEA: 0
CONSTIPATION: 0
ABDOMINAL PAIN: 0
EYE PAIN: 0
BACK PAIN: 0
CHOKING: 0
VOICE CHANGE: 0
PHOTOPHOBIA: 0
CHEST TIGHTNESS: 0
ANAL BLEEDING: 0
COLOR CHANGE: 0
TROUBLE SWALLOWING: 0
FACIAL SWELLING: 0

## 2020-01-09 RX ORDER — LISINOPRIL 20 MG/1
20 TABLET ORAL DAILY
Qty: 90 TABLET | Refills: 0 | Status: SHIPPED | OUTPATIENT
Start: 2020-01-09 | End: 2020-03-30

## 2020-02-12 ENCOUNTER — OFFICE VISIT (OUTPATIENT)
Dept: PRIMARY CARE CLINIC | Age: 60
End: 2020-02-12
Payer: MEDICARE

## 2020-02-12 VITALS
BODY MASS INDEX: 23.98 KG/M2 | SYSTOLIC BLOOD PRESSURE: 130 MMHG | WEIGHT: 203.1 LBS | HEIGHT: 77 IN | DIASTOLIC BLOOD PRESSURE: 95 MMHG | HEART RATE: 94 BPM | OXYGEN SATURATION: 99 %

## 2020-02-12 PROCEDURE — 99214 OFFICE O/P EST MOD 30 MIN: CPT | Performed by: FAMILY MEDICINE

## 2020-02-12 RX ORDER — METHYLPREDNISOLONE 4 MG/1
TABLET ORAL
Qty: 1 KIT | Refills: 0 | Status: SHIPPED | OUTPATIENT
Start: 2020-02-12 | End: 2020-02-18

## 2020-02-12 RX ORDER — CYCLOBENZAPRINE HCL 5 MG
5 TABLET ORAL NIGHTLY PRN
Qty: 30 TABLET | Refills: 0 | Status: SHIPPED | OUTPATIENT
Start: 2020-02-12 | End: 2020-02-22

## 2020-02-12 RX ORDER — COLCHICINE 0.6 MG/1
0.6 TABLET ORAL DAILY
Qty: 30 TABLET | Refills: 2 | Status: SHIPPED | OUTPATIENT
Start: 2020-02-12 | End: 2020-09-15

## 2020-02-12 RX ORDER — BLOOD PRESSURE TEST KIT
KIT MISCELLANEOUS
Qty: 1 KIT | Refills: 0 | Status: SHIPPED | OUTPATIENT
Start: 2020-02-12 | End: 2020-09-15

## 2020-02-12 RX ORDER — NAPROXEN 500 MG/1
500 TABLET ORAL 2 TIMES DAILY WITH MEALS
Qty: 60 TABLET | Refills: 3 | Status: SHIPPED | OUTPATIENT
Start: 2020-02-12 | End: 2020-08-12 | Stop reason: SINTOL

## 2020-02-12 RX ORDER — ALLOPURINOL 100 MG/1
200 TABLET ORAL DAILY
Qty: 60 TABLET | Refills: 5 | Status: SHIPPED | OUTPATIENT
Start: 2020-02-12 | End: 2021-02-01

## 2020-02-12 ASSESSMENT — ENCOUNTER SYMPTOMS
PHOTOPHOBIA: 0
EYE DISCHARGE: 0
BOWEL INCONTINENCE: 0
NAUSEA: 0
VOICE CHANGE: 0
ABDOMINAL PAIN: 0
VOMITING: 0
APNEA: 0
CHEST TIGHTNESS: 0
EYE REDNESS: 0
SHORTNESS OF BREATH: 0
BLOOD IN STOOL: 0
FACIAL SWELLING: 0
CHOKING: 0
SORE THROAT: 0
TROUBLE SWALLOWING: 0
COUGH: 0
COLOR CHANGE: 0
RECTAL PAIN: 0
BACK PAIN: 1
WHEEZING: 0
CONSTIPATION: 0
EYE PAIN: 0
ANAL BLEEDING: 0
SINUS PRESSURE: 0
EYE ITCHING: 0

## 2020-02-12 NOTE — PROGRESS NOTES
Subjective:      Patient ID: Harrison Olson is a 62 y.o. male. Low back pain  Fu bp and gout  Hypertension   Pertinent negatives include no chest pain, headaches, neck pain, palpitations or shortness of breath. Back Pain   This is a chronic (64 y/o male known deg disc disease lumbar and c spine previous fusion surgery  recurrent pain  low back 2 weeks, seems to be getting worse) problem. The current episode started more than 1 year ago. The problem occurs constantly. The problem has been rapidly worsening since onset. The pain is present in the lumbar spine. The pain is at a severity of 9/10. The pain is severe. The symptoms are aggravated by bending and lying down. Stiffness is present all day. Associated symptoms include paresthesias and tingling. Pertinent negatives include no abdominal pain, bladder incontinence, bowel incontinence, chest pain, dysuria, fever, headaches, leg pain, numbness, weakness or weight loss. He has tried analgesics, heat and muscle relaxant (pain meds, nsaids, PT) for the symptoms. The treatment provided mild relief.    he has h/o gouty arthritis mainly right wrist area  Out of allopurinol at this time  No acute pain today    He has htn  Did take bp meds today  No ha or sob or cp    In lots of back pains    Previous xrays of neck and lumbar areas show deg changes    Past Medical History:   Diagnosis Date    Arthropathy     TISHA positive    Back pain     Depression     Hyperlipidemia     Hypertension      Social History     Socioeconomic History    Marital status: Legally      Spouse name: Not on file    Number of children: Not on file    Years of education: Not on file    Highest education level: Not on file   Occupational History    Occupation: Disabled     Comment: 2014   Social Needs    Financial resource strain: Not on file    Food insecurity:     Worry: Not on file     Inability: Not on file   [employer] needs:     Medical: Not on file     Non-medical: Not on incontinence, decreased urine volume, difficulty urinating, discharge, dysuria, enuresis, flank pain, frequency, hematuria, penile swelling, scrotal swelling, testicular pain and urgency. Musculoskeletal: Positive for back pain. Negative for arthralgias, gait problem, joint swelling, myalgias, neck pain and neck stiffness. Skin: Negative for color change, pallor, rash and wound. Neurological: Positive for tingling and paresthesias. Negative for dizziness, tremors, seizures, syncope, facial asymmetry, speech difficulty, weakness, light-headedness, numbness and headaches. Hematological: Negative for adenopathy. Does not bruise/bleed easily. Psychiatric/Behavioral: Negative for agitation, behavioral problems, confusion, decreased concentration, dysphoric mood, hallucinations, self-injury, sleep disturbance and suicidal ideas. The patient is not nervous/anxious and is not hyperactive. Objective:   Physical Exam  Constitutional:       General: He is not in acute distress. Appearance: He is well-developed. He is not diaphoretic. HENT:      Head: Normocephalic and atraumatic. Right Ear: External ear normal.      Left Ear: External ear normal.      Nose: Nose normal.      Mouth/Throat:      Pharynx: No oropharyngeal exudate. Eyes:      General: No scleral icterus. Right eye: No discharge. Left eye: No discharge. Conjunctiva/sclera: Conjunctivae normal.      Pupils: Pupils are equal, round, and reactive to light. Neck:      Musculoskeletal: Normal range of motion and neck supple. Thyroid: No thyromegaly. Vascular: No JVD. Trachea: No tracheal deviation. Cardiovascular:      Rate and Rhythm: Normal rate and regular rhythm. Pulses:           Carotid pulses are 2+ on the right side and 2+ on the left side. Radial pulses are 2+ on the right side and 2+ on the left side. Femoral pulses are 2+ on the right side and 2+ on the left side. Popliteal pulses are 2+ on the right side and 2+ on the left side. Dorsalis pedis pulses are 2+ on the right side and 2+ on the left side. Posterior tibial pulses are 2+ on the right side and 2+ on the left side. Heart sounds: Normal heart sounds. No murmur. No friction rub. Pulmonary:      Effort: Pulmonary effort is normal. No respiratory distress. Breath sounds: Normal breath sounds. No stridor. No wheezing or rales. Chest:      Chest wall: No tenderness. Abdominal:      General: Bowel sounds are normal. There is no distension. Palpations: Abdomen is soft. There is no mass. Tenderness: There is no abdominal tenderness. There is no guarding or rebound. Musculoskeletal: Normal range of motion. General: No tenderness. Comments: SLR right   Lymphadenopathy:      Cervical: No cervical adenopathy. Skin:     General: Skin is warm and dry. Coloration: Skin is not pale. Findings: No rash. Neurological:      Mental Status: He is oriented to person, place, and time. Cranial Nerves: No cranial nerve deficit. Motor: No abnormal muscle tone. Coordination: Coordination normal.      Deep Tendon Reflexes: Reflexes normal.   Psychiatric:         Behavior: Behavior normal.         Thought Content: Thought content normal.         Judgment: Judgment normal.         Assessment:      1. Lumbar disc disease with radiculopathy  Recurrent pain  Add muscle relaxant  nsaids  Po steroids  Needs to fu with Dr Emily Kimble, rheumatology   - cyclobenzaprine (FLEXERIL) 5 MG tablet; Take 1 tablet by mouth nightly as needed for Muscle spasms  Dispense: 30 tablet; Refill: 0  - naproxen (NAPROSYN) 500 MG tablet; Take 1 tablet by mouth 2 times daily (with meals)  Dispense: 60 tablet; Refill: 3  - methylPREDNISolone (MEDROL, FAB,) 4 MG tablet; Take by mouth. Dispense: 1 kit; Refill: 0    2.  Gouty arthritis  No current attack  High uric acid  restart  - allopurinol (ZYLOPRIM) 100 MG tablet; Take 2 tablets by mouth daily And cancel script for one tab a day  Dispense: 60 tablet; Refill: 5  - colchicine (COLCRYS) 0.6 MG tablet; Take 1 tablet by mouth daily  Dispense: 30 tablet; Refill: 2    3.  Essential hypertension    bp up some  2/2 htn I suspect  Normal last visit    Jarocho bp 2 months  Give blood pressure        Plan:              Valentino Revels, MD

## 2020-03-30 RX ORDER — LISINOPRIL 20 MG/1
20 TABLET ORAL DAILY
Qty: 90 TABLET | Refills: 0 | Status: SHIPPED | OUTPATIENT
Start: 2020-03-30 | End: 2020-07-07

## 2020-04-13 ENCOUNTER — NURSE TRIAGE (OUTPATIENT)
Dept: OTHER | Facility: CLINIC | Age: 60
End: 2020-04-13

## 2020-04-13 ENCOUNTER — TELEPHONE (OUTPATIENT)
Dept: PRIMARY CARE CLINIC | Age: 60
End: 2020-04-13

## 2020-04-13 NOTE — TELEPHONE ENCOUNTER
Patient called talked to NT and transferred back to me. He has been exposed to Man. His brother is coming home from the hospital tomorrow; they live together. Wants to know what he should do once brother comes home.

## 2020-04-13 NOTE — TELEPHONE ENCOUNTER
Long discussion with patient who lives with his brother(now hosp. With covid for 7 days, coming home tomorrow)  And his brother family. Health department aware. Currently family has separate room for his brother. Pt  Was exposed to brother at onset and actually took him to the hospital. He has been instructed to take his  Temperature each day. On look out for sxs cough,sob, loss of taste or smell or gi sxs. Discussed total  Of 14 days to look for any signs of infection in family members.  He voices understanding

## 2020-04-24 ENCOUNTER — OFFICE VISIT (OUTPATIENT)
Dept: PRIMARY CARE CLINIC | Age: 60
End: 2020-04-24
Payer: MEDICARE

## 2020-04-24 VITALS — TEMPERATURE: 98 F | HEART RATE: 91 BPM | OXYGEN SATURATION: 98 %

## 2020-04-24 PROCEDURE — 99213 OFFICE O/P EST LOW 20 MIN: CPT | Performed by: NURSE PRACTITIONER

## 2020-04-24 NOTE — PROGRESS NOTES
4/24/2020    FLU/COVID-19 CLINIC EVALUATION    HPI  SYMPTOMS:    Symptom duration, days:  [] 1   [x] 2   [] 3   [] 4   [] 5   [] 6   [] 7   [] 8   [] 9   [] 10   [] 11   [] 12   [] 13 [] 14 +      Symptom course:   [] Worsening     [x] Stable     [] Improving    [] Fevers  [] Symptom (not measured)  [] Measured (Result: )  [] Chills  [] Cough  [] Coughing up blood  [] Productive  [] Dry  [] Chest Congestion  [] Chest Tightness  [] Nasal Congestion  [] Runny Nose  [x] Feeling short of breath  [] Fatigue  [] Chest pain  [x] Headaches  [x]Tolerable  [] Severe  [] Sore throat  [] Muscle aches  [] Nausea  [] Decreased appetite  [] Vomiting  []Unable to keep fluids down  [] Diarrhea  []Severe    [] OTHER SYMPTOMS:      RISK FACTORS:  [] Pregnant or possibly pregnant  [x] Age over 61  [] Diabetes  [x] Heart disease  [] Asthma  [] COPD/Other chronic lung diseases  [] Active Cancer  [] On Chemotherapy  [] Taking oral steroids  [] History Lymphoma/Leukemia  [x] Close contact with a lab confirmed COVID-19 patient within 14 days of symptom onset  [] History of travel from affected geographical areas within 14 days of symptom onset  [] Health Care Worker Exposure no symptoms  [] Health Care Worker Exposure symptomatic      VITALS:  Vitals:    04/24/20 1622   Pulse: 91   Temp: 98 °F (36.7 °C)   SpO2: 98%        PHYSICAL EXAMINATION:  [x]Alert   [x]Oriented to person/place/time    [x]No apparent distress     []Toxic appearing    [x]Breathing appears normal     []Appears tachypneic         [x]Speaking in full sentences     TESTS:  POCT FLU:  [] Positive     []Negative  POCT STREP:  [] Positive     []Negative    [x] COVID-19 Test sent: [x] Blue   [] Red   [] Chest X-ray     ASSESSMENT:  [] Flu  [] Strep Throat  [] Uncertain Viral Respiratory Illness  [] Possible COVID-19  [x] Exposure COVID-19  [] Other:     PLAN:  [x] Discharge home with written instructions for:  [] Flu management  [] Strep throat management  [] Possible COVID-19 exposure with self-quarantine   [x] Possible COVID-19 with isolation instructions and management of symptoms  [x] Follow-up with primary care physician or emergency department if worsens  [] Referred to emergency department for evaluation    [x] Evaluation per physician/nurse practitioner in clinic  [] Prescription sent in  [x] No Prescription sent in    An  electronic signature was used to authenticate this note.      --Karyna Villafana ATC on 4/24/2020 at 4:23 PM

## 2020-04-24 NOTE — PATIENT INSTRUCTIONS
Advance Care Planning  People with COVID-19 may have no symptoms, mild symptoms, such as fever, cough, and shortness of breath or they may have more severe illness, developing severe and fatal pneumonia. As a result, Advance Care Planning with attention to naming a health care decision maker (someone you trust to make healthcare decisions for you if you could not speak for yourself) and sharing other health care preferences is important BEFORE a possible health crisis. Please contact your Primary Care Provider to discuss Advance Care Planning. Preventing the Spread of Coronavirus Disease 2019 in Homes and Residential Communities  For the most recent information go to [employer]    Prevention steps for People with confirmed or suspected COVID-19 (including persons under investigation) who do not need to be hospitalized  and   People with confirmed COVID-19 who were hospitalized and determined to be medically stable to go home    Your healthcare provider and public health staff will evaluate whether you can be cared for at home. If it is determined that you do not need to be hospitalized and can be isolated at home, you will be monitored by staff from your local or state health department. You should follow the prevention steps below until a healthcare provider or local or state health department says you can return to your normal activities. Stay home except to get medical care  People who are mildly ill with COVID-19 are able to isolate at home during their illness. You should restrict activities outside your home, except for getting medical care. Do not go to work, school, or public areas. Avoid using public transportation, ride-sharing, or taxis. Separate yourself from other people and animals in your home  People: As much as possible, you should stay in a specific room and away from other people in your home.  Also, you should use a separate bathroom, if available. Animals: You should restrict contact with pets and other animals while you are sick with COVID-19, just like you would around other people. Although there have not been reports of pets or other animals becoming sick with COVID-19, it is still recommended that people sick with COVID-19 limit contact with animals until more information is known about the virus. When possible, have another member of your household care for your animals while you are sick. If you are sick with COVID-19, avoid contact with your pet, including petting, snuggling, being kissed or licked, and sharing food. If you must care for your pet or be around animals while you are sick, wash your hands before and after you interact with pets and wear a facemask. Call ahead before visiting your doctor  If you have a medical appointment, call the healthcare provider and tell them that you have or may have COVID-19. This will help the healthcare providers office take steps to keep other people from getting infected or exposed. Wear a facemask  You should wear a facemask when you are around other people (e.g., sharing a room or vehicle) or pets and before you enter a healthcare providers office. If you are not able to wear a facemask (for example, because it causes trouble breathing), then people who live with you should not stay in the same room with you, or they should wear a facemask if they enter your room. Cover your coughs and sneezes  Cover your mouth and nose with a tissue when you cough or sneeze. Throw used tissues in a lined trash can. Immediately wash your hands with soap and water for at least 20 seconds or, if soap and water are not available, clean your hands with an alcohol-based hand  that contains at least 60% alcohol.   Clean your hands often  Wash your hands often with soap and water for at least 20 seconds, especially after blowing your nose, coughing, or sneezing; going to the bathroom; and

## 2020-04-26 LAB
SARS-COV-2: NOT DETECTED
SOURCE: NORMAL

## 2020-04-27 ENCOUNTER — TELEPHONE (OUTPATIENT)
Dept: PRIMARY CARE CLINIC | Age: 60
End: 2020-04-27

## 2020-07-02 ENCOUNTER — HOSPITAL ENCOUNTER (EMERGENCY)
Age: 60
Discharge: HOME | End: 2020-07-02
Attending: EMERGENCY MEDICINE
Payer: MEDICARE

## 2020-07-02 VITALS
TEMPERATURE: 98.8 F | RESPIRATION RATE: 18 BRPM | OXYGEN SATURATION: 98 % | BODY MASS INDEX: 25.04 KG/M2 | DIASTOLIC BLOOD PRESSURE: 100 MMHG | WEIGHT: 212.1 LBS | SYSTOLIC BLOOD PRESSURE: 159 MMHG | HEIGHT: 77 IN | HEART RATE: 93 BPM

## 2020-07-02 PROCEDURE — 90715 TDAP VACCINE 7 YRS/> IM: CPT | Performed by: EMERGENCY MEDICINE

## 2020-07-02 PROCEDURE — 99282 EMERGENCY DEPT VISIT SF MDM: CPT

## 2020-07-02 PROCEDURE — 90471 IMMUNIZATION ADMIN: CPT | Performed by: EMERGENCY MEDICINE

## 2020-07-02 PROCEDURE — 6360000002 HC RX W HCPCS: Performed by: EMERGENCY MEDICINE

## 2020-07-02 RX ORDER — LIDOCAINE HYDROCHLORIDE 20 MG/ML
3 INJECTION, SOLUTION INFILTRATION; PERINEURAL ONCE
Status: DISCONTINUED | OUTPATIENT
Start: 2020-07-02 | End: 2020-07-03 | Stop reason: HOSPADM

## 2020-07-02 RX ORDER — LIDOCAINE HYDROCHLORIDE 10 MG/ML
5 INJECTION, SOLUTION INFILTRATION; PERINEURAL ONCE
Status: DISCONTINUED | OUTPATIENT
Start: 2020-07-02 | End: 2020-07-02

## 2020-07-02 RX ORDER — BUPIVACAINE HYDROCHLORIDE 5 MG/ML
5 INJECTION, SOLUTION EPIDURAL; INTRACAUDAL ONCE
Status: DISCONTINUED | OUTPATIENT
Start: 2020-07-02 | End: 2020-07-03 | Stop reason: HOSPADM

## 2020-07-02 RX ADMIN — TETANUS TOXOID, REDUCED DIPHTHERIA TOXOID AND ACELLULAR PERTUSSIS VACCINE, ADSORBED 0.5 ML: 5; 2.5; 8; 8; 2.5 SUSPENSION INTRAMUSCULAR at 22:50

## 2020-07-02 ASSESSMENT — PAIN DESCRIPTION - PAIN TYPE: TYPE: ACUTE PAIN

## 2020-07-02 ASSESSMENT — PAIN DESCRIPTION - ORIENTATION: ORIENTATION: LEFT

## 2020-07-02 ASSESSMENT — PAIN DESCRIPTION - LOCATION: LOCATION: FINGER (COMMENT WHICH ONE)

## 2020-07-03 NOTE — ED PROVIDER NOTES
Emergency Department Provider Note  Location: [address] [place] [address]  7/2/2020     Patient Identification  Tristan Rosas is a 62 y.o. male    Chief Complaint  Laceration      Mode of Arrival  private car    HPI  (History provided by patient)  This is a 62 y.o. male, right hand dominant, presented today for left index finger laceration. Patient was cutting meat and accidentally cut the tip of his left index finger. Patient denies injuries anywhere else. Bleeding controlled prior to arrival.  He does not know when was his last tetanus vaccination. ROS  Review of Systems   Skin: Positive for wound. Neurological: Negative for weakness and numbness. Hematological: Does not bruise/bleed easily. I have reviewed the following nursing documentation:  Allergies: No Known Allergies    Past medical history:  has a past medical history of Arthropathy, Back pain, Depression, Hyperlipidemia, and Hypertension. Past surgical history:  has a past surgical history that includes Colonoscopy; Endoscopy, colon, diagnostic; and lumbar fusion (2017). Home medications:   Prior to Admission medications    Medication Sig Start Date End Date Taking?  Authorizing Provider   lisinopril (PRINIVIL;ZESTRIL) 20 MG tablet TAKE 1 TABLET BY MOUTH DAILY 3/30/20 6/28/20  Rima Haines MD   allopurinol (ZYLOPRIM) 100 MG tablet Take 2 tablets by mouth daily And cancel script for one tab a day 2/12/20   Rima Haines MD   colchicine (COLCRYS) 0.6 MG tablet Take 1 tablet by mouth daily 2/12/20   Rima Haines MD   naproxen (NAPROSYN) 500 MG tablet Take 1 tablet by mouth 2 times daily (with meals) 2/12/20   Rima Haines MD   Blood Pressure KIT Ck bp once a week 2/12/20   Rima Haines MD   zoster recombinant adjuvanted vaccine [facility]) 50 MCG/0.5ML SUSR injection Inject 0.5 mLs into the muscle 2/5/19   Historical Provider, MD   HYDROcodone-acetaminophen (Erendira Cortez) 5-325 MG per tablet  5/5/19   Historical Provider, MD ibuprofen (ADVIL;MOTRIN) 600 MG tablet Take 1 tablet by mouth every 6 hours as needed for Pain  Patient not taking: Reported on 2/12/2020 4/10/19   JARETH Peace   folic acid (FOLVITE) 1 MG tablet Take 1 tablet by mouth daily 2/1/19   Historical Provider, MD   hydroxychloroquine (PLAQUENIL) 200 MG tablet Take 200 mg by mouth 2 times daily 2/1/19   Historical Provider, MD   nortriptyline (PAMELOR) 10 MG capsule TK 1 TO 2 CS PO AT NIGHT AS TOLERATED 11/11/18   Historical Provider, MD   pantoprazole (PROTONIX) 40 MG tablet Take 40 mg by mouth daily 10/24/18   Historical Provider, MD   cyclobenzaprine (FLEXERIL) 10 MG tablet Take 1 tablet by mouth 2 times daily as needed for Muscle spasms  Patient not taking: Reported on 2/12/2020 1/16/19   Sylvia Proctor MD   gabapentin (NEURONTIN) 600 MG tablet Take 1 tablet by mouth 3 times daily for 29 days. . 9/10/18 4/10/19  Bobette Mortimer, MD       Social history:  reports that he has never smoked. He has never used smokeless tobacco. He reports that he does not drink alcohol or use drugs. Family history:    Family History   Problem Relation Age of Onset    Cancer Mother     Hypertension Mother    Tatum Plascencia Diabetes Maternal Uncle        Exam  ED Triage Vitals [07/02/20 2122]   BP Temp Temp Source Pulse Resp SpO2 Height Weight   (!) 159/100 98.8 °F (37.1 °C) Oral 93 18 98 % 6' 5\" (1.956 m) 212 lb 1.6 oz (96.2 kg)   Physical Exam  Vitals signs and nursing note reviewed. Constitutional:       General: He is not in acute distress. Appearance: Normal appearance. He is well-developed. He is not diaphoretic. HENT:      Head: Normocephalic and atraumatic. Eyes:      General: No scleral icterus. Right eye: No discharge. Left eye: No discharge. Neck:      Trachea: No tracheal deviation. Cardiovascular:      Pulses:           Radial pulses are 2+ on the left side. Pulmonary:      Effort: Pulmonary effort is normal. No respiratory distress.       Breath sounds: No stridor. Skin:     General: Skin is warm and dry. Capillary Refill: Capillary refill takes less than 2 seconds. Coloration: Skin is not pale. Findings: Laceration (linear laceration at the tip of left index finger that does not gape open with movement of the finger. No fingernail involvement) present. Neurological:      General: No focal deficit present. Mental Status: He is alert and oriented to person, place, and time. Cranial Nerves: No dysarthria or facial asymmetry. Psychiatric:         Behavior: Behavior normal.           MDM/ED Course  ED Medication Orders (From admission, onward)    Start Ordered     Status Ordering Provider    07/02/20 2245 07/02/20 2244  Tetanus-Diphth-Acell Pertussis (BOOSTRIX) injection 0.5 mL  ONCE      Last MAR action:  Given - by Bibi Chen on 07/02/20 at [address] [facility]    07/02/20 2200 07/02/20 2146  lidocaine 2 % injection 3 mL  ONCE      Acknowledged [facility]    07/02/20 2145 07/02/20 2141  bupivacaine (PF) (MARCAINE) 0.5 % injection 25 mg  ONCE      Acknowledged [place], [place] [facility]          - Patient seen and evaluated in room 9.  62 y.o. male presented for left index finger laceration. Digital block performed by myself for pain control. Procedure Note:  Digital Block  The risks and benefits of an digital block were explained to the patient. Lara Clifford or their surrogate had an opportunity to ask questions, and the risks, benefits, and alternatives were discussed. Patient verbally consented to the procedure. I mixed 4 mL of 2% lidocaine without epi and 4 mL of 0.5% bupivacaine. Using pt's proximal phalanx of the left index finger as landmark, I injected 1.5 mL of the mixture on each side of the digit (total 3 mL)  ESBL minimal. Complication none. Procedure Note:  Wound Repair  Lara Clifford or their surrogate had an opportunity to ask questions, and the risks, benefits,   and alternatives were discussed.  The patients [employer]       Harika Carbajal MD  07/03/20 2896

## 2020-07-03 NOTE — ED NOTES
Patient given d/c instructions with return verbalization. Reviewed s/s of infection. To return with worsening s/s. Patient ambulated to lobby with steady gait.        Cee Castorena RN  07/02/20 6831

## 2020-07-07 RX ORDER — LISINOPRIL 20 MG/1
20 TABLET ORAL DAILY
Qty: 90 TABLET | Refills: 0 | Status: SHIPPED | OUTPATIENT
Start: 2020-07-07 | End: 2020-10-27

## 2020-07-13 ENCOUNTER — NURSE ONLY (OUTPATIENT)
Dept: PRIMARY CARE CLINIC | Age: 60
End: 2020-07-13
Payer: MEDICARE

## 2020-07-13 PROCEDURE — 99211 OFF/OP EST MAY X REQ PHY/QHP: CPT | Performed by: NURSE PRACTITIONER

## 2020-07-13 NOTE — PROGRESS NOTES
Delia Reardon received a viral test for COVID-19. They were educated on isolation and quarantine as appropriate. For any symptoms, they were directed to seek care from their PCP, given contact information to establish with a doctor, directed to an urgent care or the emergency room.

## 2020-07-16 LAB
SARS-COV-2: NOT DETECTED
SOURCE: NORMAL

## 2020-08-12 ENCOUNTER — OFFICE VISIT (OUTPATIENT)
Dept: PRIMARY CARE CLINIC | Age: 60
End: 2020-08-12
Payer: MEDICARE

## 2020-08-12 VITALS
WEIGHT: 210 LBS | OXYGEN SATURATION: 99 % | SYSTOLIC BLOOD PRESSURE: 128 MMHG | TEMPERATURE: 97.2 F | BODY MASS INDEX: 24.79 KG/M2 | HEART RATE: 87 BPM | HEIGHT: 77 IN | DIASTOLIC BLOOD PRESSURE: 88 MMHG

## 2020-08-12 PROBLEM — M65.9 SYNOVITIS OF WRIST: Status: ACTIVE | Noted: 2019-02-07

## 2020-08-12 PROCEDURE — 99214 OFFICE O/P EST MOD 30 MIN: CPT | Performed by: INTERNAL MEDICINE

## 2020-08-12 RX ORDER — METHYLPREDNISOLONE 4 MG/1
TABLET ORAL
Qty: 1 KIT | Refills: 0 | Status: SHIPPED | OUTPATIENT
Start: 2020-08-12 | End: 2020-08-18

## 2020-08-12 RX ORDER — GABAPENTIN 600 MG/1
600 TABLET ORAL 3 TIMES DAILY
Qty: 90 TABLET | Refills: 5 | Status: SHIPPED | OUTPATIENT
Start: 2020-08-12 | End: 2021-09-27

## 2020-08-12 RX ORDER — AMLODIPINE BESYLATE 2.5 MG/1
2.5 TABLET ORAL DAILY
Qty: 90 TABLET | Refills: 1 | Status: SHIPPED | OUTPATIENT
Start: 2020-08-12 | End: 2021-09-27

## 2020-08-12 ASSESSMENT — ENCOUNTER SYMPTOMS
ALLERGIC/IMMUNOLOGIC NEGATIVE: 1
COLOR CHANGE: 0
ABDOMINAL DISTENTION: 0
EYE REDNESS: 0
STRIDOR: 0
APNEA: 0
DIARRHEA: 0
COUGH: 0
VOICE CHANGE: 0
EYE ITCHING: 0
PHOTOPHOBIA: 0
CHEST TIGHTNESS: 0
BLOOD IN STOOL: 0
RECTAL PAIN: 0
BACK PAIN: 0
ANAL BLEEDING: 0
VOMITING: 0
EYE DISCHARGE: 0
SHORTNESS OF BREATH: 0
NAUSEA: 0
TROUBLE SWALLOWING: 0
ABDOMINAL PAIN: 0
WHEEZING: 0
RHINORRHEA: 0
CONSTIPATION: 0
CHOKING: 0
EYE PAIN: 0
SORE THROAT: 0

## 2020-08-12 ASSESSMENT — PATIENT HEALTH QUESTIONNAIRE - PHQ9
2. FEELING DOWN, DEPRESSED OR HOPELESS: 0
SUM OF ALL RESPONSES TO PHQ QUESTIONS 1-9: 0
SUM OF ALL RESPONSES TO PHQ QUESTIONS 1-9: 0
1. LITTLE INTEREST OR PLEASURE IN DOING THINGS: 0
SUM OF ALL RESPONSES TO PHQ9 QUESTIONS 1 & 2: 0

## 2020-08-12 NOTE — PATIENT INSTRUCTIONS
Patient Education        Purine-Restricted Diet: Care Instructions  Your Care Instructions     Purines are substances that are found in some foods. Your body turns purines into uric acid. High levels of uric acid can cause gout, which is a form of arthritis that causes pain and inflammation in joints. You may be able to help control the amount of uric acid in your body by limiting high-purine foods in your diet. Follow-up care is a key part of your treatment and safety. Be sure to make and go to all appointments, and call your doctor if you are having problems. It's also a good idea to know your test results and keep a list of the medicines you take. How can you care for yourself at home? · Plan your meals and snacks around foods that are low in purines and are safe for you to eat. These foods include:  ? Green vegetables and tomatoes. ? Fruits. ? Whole-grain breads, rice, and cereals. ? Eggs, peanut butter, and nuts. ? Low-fat milk, cheese, and other milk products. ? Popcorn. ? Gelatin desserts, chocolate, cocoa, and cakes and sweets, in small amounts. · You can eat certain foods that are medium-high in purines, but eat them only once in a while. These foods include:  ? Legumes, such as dried beans and dried peas. You can have 1 cup cooked legumes each day. ? Asparagus, cauliflower, spinach, mushrooms, and green peas. ? Fish and seafood (other than very high-purine seafood). ? Oatmeal, wheat bran, and wheat germ. · Limit very high-purine foods, including:  ? Organ meats, such as liver, kidneys, sweetbreads, and brains. ? Meats, including read, beef, pork, and lamb. ? Game meats and any other meats in large amounts. ? Anchovies, sardines, herring, mackerel, and scallops. ? Gravy. ? Beer. Where can you learn more? Go to [email or web address] and sign in to your [employer] account.  Enter F448 in the [facility] box to learn more about \"Purine-Restricted Diet: Care Instructions. \"     If you do not have an account, please click on the \"Sign Up Now\" link. Current as of: August 22, 2019               Content Version: 12.5  © 2006-2020 [employer] Incorporated. Care instructions adapted under license by [facility] ([facility]). If you have questions about a medical condition or this instruction, always ask your healthcare professional. [address] any warranty or liability for your use of this information. Patient Education        Carpal Tunnel Syndrome: Exercises  Introduction  Here are some examples of exercises for you to try. The exercises may be suggested for a condition or for rehabilitation. Start each exercise slowly. Ease off the exercises if you start to have pain. You will be told when to start these exercises and which ones will work best for you. Warm-up stretches  When you no longer have pain or numbness, you can do exercises to help prevent carpal tunnel syndrome from coming back. Do not do any stretch or movement that is uncomfortable or painful. 1. Rotate your wrist up, down, and from side to side. Repeat 4 times. 2. Stretch your fingers far apart. Relax them, and then stretch them again. Repeat 4 times. 3. Stretch your thumb by pulling it back gently, holding it, and then releasing it. Repeat 4 times. How to do the exercises  Prayer stretch   1. Start with your palms together in front of your chest just below your chin. 2. Slowly lower your hands toward your waistline, keeping your hands close to your stomach and your palms together until you feel a mild to moderate stretch under your forearms. 3. Hold for at least 15 to 30 seconds. Repeat 2 to 4 times. Wrist flexor stretch   1. Extend your arm in front of you with your palm up. 2. Bend your wrist, pointing your hand toward the floor. 3. With your other hand, gently bend your wrist farther until you feel a mild to moderate stretch in your forearm.   4. Hold for at least 15 to 30

## 2020-08-12 NOTE — PROGRESS NOTES
2020     Darby Shipley (:  1960) is a 62 y.o. male, here for evaluation of the following medical concerns:    HPI     Hand numbness of bilateral hands with activity and rest has a history of carpal tunnel syndrome and had a positive Tinel's sign in the right hand on exam today. On hand weakness. Symptoms present for years but getting worse. Has not use hand splint. Also has history of cervical arthritis and inherent radicular right arm pain in the past.      Sciatica left, two back surgery with recent exacerbation of sciatica of the right leg. Chronic back pain that interferes with walking. No problems with controlled bowel or bladder. Currently not taking anything for the pain. Diastolic hypertension noted on exam today and history of this in the past. Currently treated with lisinopril 20 mg daily with good systolic control. No history of renal disease or congestive heart failure. No leg edema orthopnea dyspnea on exertion or shortness of breath.   radiation information found for this patient    Narrative    EXAMINATION:    THREE XRAY VIEWS OF THE CERVICAL SPINE; 3 XRAY VIEWS OF THE LUMBAR SPINE         9/15/2018 8:15 pm         COMPARISON:    None.         HISTORY:    ORDERING PHYSICIAN PROVIDED HISTORY: DDD (degenerative disc disease), lumbar    TECHNOLOGIST PROVIDED HISTORY:    Technologist Provided Reason for Exam: Dx: DDD (degenerative disc disease),    lumbar M51.36 (ICD-10-CM); DDD (degenerative disc disease), cervical M50.30    (ICD-10-CM)    Acuity: Chronic    Type of Encounter: Subsequent/Follow-up         FINDINGS:    Cervical spine: 3 images were provided.  Degenerative changes are greatest at    C5-6 anteriorly.  Prevertebral soft tissues are normal in thickness.  AP    alignment is normal.  No acute displaced fracture is noted on the lateral,    there is heterogeneous density of the mandible.  This is indeterminate.         Lumbar spine: 3 images were provided.  Postoperative changes are noted    beginning at L3 extending through S1.  Multilevel degenerative change is    noted, greatest at L5-S1.  There is no acute fracture.              Impression    Degenerative changes in the cervical spine without acute osseous abnormality         Degenerative changes and postop changes in the lumbar spine without acute    osseous abnormality        History of gout. Needs recent uric acid level. Currently treated with allopurinol 20 mg daily with no recent flareups. Patient Active Problem List   Diagnosis    Chest pain    Abnormal stress test    TISHA positive    DDD (degenerative disc disease), cervical    DDD (degenerative disc disease), lumbar    Depression    Helicobacter pylori infection    Hand arthritis    Hypertension    Hypertriglyceridemia    Left arm numbness    Left arm weakness    Left low back pain    Neck pain on left side    Right wrist pain    SS-A antibody positive    SS-B antibody positive    Postlaminectomy syndrome, lumbar    H/O: substance abuse (HCC)    Chronic pain syndrome     No Known Allergies      Review of Systems   Constitutional: Negative. Negative for activity change, appetite change, chills, diaphoresis, fatigue and fever. HENT: Negative for dental problem, ear pain, hearing loss, mouth sores, nosebleeds, postnasal drip, rhinorrhea, sore throat, trouble swallowing and voice change. Eyes: Negative for photophobia, pain, discharge, redness, itching and visual disturbance. Respiratory: Negative for apnea, cough, choking, chest tightness, shortness of breath, wheezing and stridor. Cardiovascular: Negative for chest pain, palpitations and leg swelling. Htn     Gastrointestinal: Negative for abdominal distention, abdominal pain, anal bleeding, blood in stool, constipation, diarrhea, nausea, rectal pain and vomiting. Endocrine: Negative. Genitourinary: Negative. Negative for dysuria, flank pain, frequency and hematuria.    Musculoskeletal: Negative for arthralgias, back pain, gait problem, joint swelling, myalgias, neck pain and neck stiffness. Gout  Bilateral cts history  Lumbar laminectomy times new  Progressive sciatica left leg. Numerous regions of soft tissue and tendon sheath uric acid deposits throughout the right wrist    and right hand as detailed above.       Multifocal erosive arthropathy throughout the carpus.       Report Verified by: Vivienne Rai at 3/15/2019 3:21 PM EDT        Skin: Negative for color change, pallor, rash and wound. Allergic/Immunologic: Negative. Neurological: Positive for numbness. Negative for dizziness, tremors, seizures, syncope, facial asymmetry, speech difficulty, weakness, light-headedness and headaches. Hematological: Negative for adenopathy. Does not bruise/bleed easily. Psychiatric/Behavioral: Negative for agitation, behavioral problems, confusion, decreased concentration, dysphoric mood, hallucinations, self-injury, sleep disturbance and suicidal ideas. The patient is not nervous/anxious and is not hyperactive. Prior to Visit Medications    Medication Sig Taking?  Authorizing Provider   lisinopril (PRINIVIL;ZESTRIL) 20 MG tablet TAKE 1 TABLET BY MOUTH DAILY Yes Davina Patino MD   allopurinol (ZYLOPRIM) 100 MG tablet Take 2 tablets by mouth daily And cancel script for one tab a day Yes Davina Patino MD   colchicine (COLCRYS) 0.6 MG tablet Take 1 tablet by mouth daily Yes Davina Patino MD   Blood Pressure KIT Ck bp once a week Yes Davina Patino MD   zoster recombinant adjuvanted vaccine [facility]) 50 MCG/0.5ML SUSR injection Inject 0.5 mLs into the muscle Yes Historical Provider, MD   HYDROcodone-acetaminophen (Ronak Malika) 5-325 MG per tablet  Yes Historical Provider, MD   folic acid (FOLVITE) 1 MG tablet Take 1 tablet by mouth daily Yes Historical Provider, MD   hydroxychloroquine (PLAQUENIL) 200 MG tablet Take 200 mg by mouth 2 times daily Yes Historical Provider, MD   nortriptyline (PAMELOR) 10 MG capsule TK 1 TO 2 CS PO AT NIGHT AS TOLERATED Yes Historical Provider, MD   gabapentin (NEURONTIN) 600 MG tablet Take 1 tablet by mouth 3 times daily for 29 days. Izabela Melgoza MD        Social History     Tobacco Use    Smoking status: Never Smoker    Smokeless tobacco: Never Used   Substance Use Topics    Alcohol use: No        Vitals:    08/12/20 1010 08/12/20 1047 08/12/20 1048   BP: 128/89 (!) 130/92 128/88   Site:  Left Upper Arm Right Upper Arm   Position:  Sitting Sitting   Cuff Size:  Large Adult Large Adult   Pulse: 87     Temp: 97.2 °F (36.2 °C)     TempSrc: Oral     SpO2: 99%     Weight: 210 lb (95.3 kg)     Height: 6' 5\" (1.956 m)       Estimated body mass index is 24.9 kg/m² as calculated from the following:    Height as of this encounter: 6' 5\" (1.956 m). Weight as of this encounter: 210 lb (95.3 kg). Physical Exam  Constitutional:       General: He is not in acute distress. Appearance: He is well-developed. He is not diaphoretic. HENT:      Head: Normocephalic and atraumatic. Right Ear: Tympanic membrane, ear canal and external ear normal.      Left Ear: Tympanic membrane, ear canal and external ear normal.      Nose: Nose normal.      Mouth/Throat:      Pharynx: No oropharyngeal exudate. Eyes:      General: No scleral icterus. Right eye: No discharge. Left eye: No discharge. Conjunctiva/sclera: Conjunctivae normal.      Pupils: Pupils are equal, round, and reactive to light. Neck:      Musculoskeletal: Normal range of motion and neck supple. Thyroid: No thyromegaly. Vascular: No JVD. Trachea: No tracheal deviation. Cardiovascular:      Rate and Rhythm: Normal rate and regular rhythm. Pulses:           Carotid pulses are 0 on the right side and 0 on the left side. Heart sounds: Normal heart sounds. No murmur. No gallop. Pulmonary:      Effort: Pulmonary effort is normal. No respiratory distress.       Breath sounds: Normal breath sounds. No wheezing or rales. Chest:      Chest wall: No tenderness. Abdominal:      General: Bowel sounds are normal. There is no distension. Palpations: Abdomen is soft. There is no mass. Tenderness: There is no abdominal tenderness. There is no guarding or rebound. Musculoskeletal: Normal range of motion. General: No tenderness. Right lower leg: No edema. Left lower leg: No edema. Comments: Weakness of left leg  Pain with flexion of lumbar spine. Positive tinel's sign right. Lymphadenopathy:      Cervical: No cervical adenopathy. Comments: No adenopathy of cervical, supraclavicular, axillary or inguinal nodes. Skin:     General: Skin is warm and dry. Coloration: Skin is not pale. Findings: No erythema or rash. Neurological:      Mental Status: He is alert and oriented to person, place, and time. Cranial Nerves: No cranial nerve deficit. Motor: No abnormal muscle tone. Coordination: Coordination normal.      Deep Tendon Reflexes: Reflexes are normal and symmetric. Reflexes normal.   Psychiatric:         Behavior: Behavior normal.         Thought Content: Thought content normal.         Judgment: Judgment normal.         ASSESSMENT/PLAN:   Diagnosis Orders   1. Sciatica, left side  gabapentin (NEURONTIN) 600 MG tablet    methylPREDNISolone (MEDROL DOSEPACK) 4 MG tablet    XR LUMBAR SPINE (2-3 VIEWS)   2. Bilateral carpal tunnel syndrome more symptomatic on the right. Braulio Riggs MD, Hand Surgery (Non Surgical-Hand, Wrist, Upper Extremity), [facility]   3. Essential hypertension continue lisinopril 20 mg daily and add amlodipine 2.5 mg for diastolic control. amLODIPine (NORVASC) 2.5 MG tablet    Comprehensive Metabolic Panel    CBC Auto Differential   4.  DDD (degenerative disc disease), cervical  XR CERVICAL SPINE (2-3 VIEWS)   5. Idiopathic gout, unspecified chronicity, unspecified site uric acid level to see if six or less which is our goal.  Uric Acid    CBC Auto Differential       An electronic signature was used to authenticate this note.     --Alex Pearson MD on 8/12/2020 at 10:52 AM

## 2020-08-14 ENCOUNTER — HOSPITAL ENCOUNTER (OUTPATIENT)
Age: 60
Discharge: HOME | End: 2020-08-14
Payer: MEDICARE

## 2020-08-14 ENCOUNTER — HOSPITAL ENCOUNTER (OUTPATIENT)
Dept: GENERAL RADIOLOGY | Age: 60
Discharge: HOME | End: 2020-08-14
Payer: MEDICARE

## 2020-08-14 DIAGNOSIS — M10.00 IDIOPATHIC GOUT, UNSPECIFIED CHRONICITY, UNSPECIFIED SITE: ICD-10-CM

## 2020-08-14 DIAGNOSIS — I10 ESSENTIAL HYPERTENSION: ICD-10-CM

## 2020-08-14 LAB
A/G RATIO: 1.3 (ref 1.1–2.2)
ALBUMIN SERPL-MCNC: 4.5 G/DL (ref 3.4–5)
ALP BLD-CCNC: 71 U/L (ref 40–129)
ALT SERPL-CCNC: 15 U/L (ref 10–40)
ANION GAP SERPL CALCULATED.3IONS-SCNC: 14 MMOL/L (ref 3–16)
AST SERPL-CCNC: 15 U/L (ref 15–37)
BASOPHILS ABSOLUTE: 0.1 K/UL (ref 0–0.2)
BASOPHILS RELATIVE PERCENT: 0.6 %
BILIRUB SERPL-MCNC: <0.2 MG/DL (ref 0–1)
BUN BLDV-MCNC: 12 MG/DL (ref 7–20)
CALCIUM SERPL-MCNC: 9.8 MG/DL (ref 8.3–10.6)
CHLORIDE BLD-SCNC: 102 MMOL/L (ref 99–110)
CO2: 23 MMOL/L (ref 21–32)
CREAT SERPL-MCNC: 0.9 MG/DL (ref 0.9–1.3)
EOSINOPHILS ABSOLUTE: 0 K/UL (ref 0–0.6)
EOSINOPHILS RELATIVE PERCENT: 0.1 %
GFR AFRICAN AMERICAN: >60
GFR NON-AFRICAN AMERICAN: >60
GLOBULIN: 3.5 G/DL
GLUCOSE BLD-MCNC: 112 MG/DL (ref 70–99)
HCT VFR BLD CALC: 44.2 % (ref 40.5–52.5)
HEMOGLOBIN: 14.5 G/DL (ref 13.5–17.5)
LYMPHOCYTES ABSOLUTE: 1.5 K/UL (ref 1–5.1)
LYMPHOCYTES RELATIVE PERCENT: 16.8 %
MCH RBC QN AUTO: 27.5 PG (ref 26–34)
MCHC RBC AUTO-ENTMCNC: 32.7 G/DL (ref 31–36)
MCV RBC AUTO: 83.9 FL (ref 80–100)
MONOCYTES ABSOLUTE: 0.5 K/UL (ref 0–1.3)
MONOCYTES RELATIVE PERCENT: 5.6 %
NEUTROPHILS ABSOLUTE: 7 K/UL (ref 1.7–7.7)
NEUTROPHILS RELATIVE PERCENT: 76.9 %
PDW BLD-RTO: 14 % (ref 12.4–15.4)
PLATELET # BLD: 237 K/UL (ref 135–450)
PMV BLD AUTO: 9.3 FL (ref 5–10.5)
POTASSIUM SERPL-SCNC: 4.7 MMOL/L (ref 3.5–5.1)
RBC # BLD: 5.27 M/UL (ref 4.2–5.9)
SODIUM BLD-SCNC: 139 MMOL/L (ref 136–145)
TOTAL PROTEIN: 8 G/DL (ref 6.4–8.2)
URIC ACID, SERUM: 5.9 MG/DL (ref 3.5–7.2)
WBC # BLD: 9 K/UL (ref 4–11)

## 2020-08-14 PROCEDURE — 72040 X-RAY EXAM NECK SPINE 2-3 VW: CPT

## 2020-08-14 PROCEDURE — 72100 X-RAY EXAM L-S SPINE 2/3 VWS: CPT

## 2020-08-21 ENCOUNTER — OFFICE VISIT (OUTPATIENT)
Dept: ORTHOPEDIC SURGERY | Age: 60
End: 2020-08-21
Payer: MEDICARE

## 2020-08-21 VITALS — RESPIRATION RATE: 16 BRPM | WEIGHT: 210 LBS | HEIGHT: 77 IN | BODY MASS INDEX: 24.79 KG/M2 | TEMPERATURE: 97.2 F

## 2020-08-21 PROCEDURE — 99203 OFFICE O/P NEW LOW 30 MIN: CPT | Performed by: ORTHOPAEDIC SURGERY

## 2020-08-21 NOTE — Clinical Note
Dear  Kris Dumas MD,    Thank you very much for your referral or Mr. Joanne Ng to me for evaluation and treatment of his Hand & Wrist condition. I appreciate your confidence in me and thank you for allowing me the opportunity to care for your patients. If I can be of any further assistance to you on this or any other patient, please do not hesitate to contact me. Sincerely,    David Smith.  Jyothi Kraft MD

## 2020-08-22 NOTE — PROGRESS NOTES
elbow bilaterally & is mild about the Volar wrist bilaterally  There is no evidence of gross joint instability bilaterally. Muscular strength is clinically appropriate bilaterally. Examination for Carpal Tunnel Syndrome shows Carpal Tunnel Compression Test to be Mildly Positive on the right & Mildly Positive on the left. The patient displays moderate baseline symptoms to potentially confound the exam.  The thenar musculature is not atrophied & weakened. Examination for Cubital Tunnel Syndrome bilaterally shows no tenderness to palpation at the Medial epicondyle. The Ulnar Nerve rests behind the medial epicondyle without subluxation upon elbow flexion. Elbow flexion-compression test is Negative, and there is an active Tinnel's Sign over the Cubital Tunnel. The Ulnar Nerve innervated intrinsic musculature is not atrophied & weakened. Impression:  Mr. Kiana Sanders is showing clinical evidence of carpal tunnel syndrome and Ulnar Nerve entrapment at the Cubital Tunnel and presents requesting further treatment. Plan:    I have had a thorough discussion with Mr. Kiana Sanders regarding the treatment options available for his initially presenting bilateral  cubital tunnel syndrome, which is causing him significant symptoms and difficulty. I have outlined for Mr. Kiana Sanders the risk, benefits and consequences of the various treatment modalities, including a reasonable expectation for the long term success of each. We have discussed the likelihood that further, more aggressive treatment may be required for his current presenting condition.   Based upon our current discussion and a reasonable understating of the options available to him, Mr. Kiana Sanders has selected to proceed with a conservative plan of treatment consisting of: attention to elbow positioning and pressure,  activity modification, and the judicious use of over-the-counter anti-inflammatory medications if allowed by his primary care physician. Instructions were given regarding the use of other modalities as appropriate. I have clearly explained to him that the above outlined treatment plan should not be expected to 'cure' his  cubital tunnel syndrome, but we are rather treating the symptoms with which he presents. He has understood that in order to achieve more durable relief of his symptoms and to prevent future worsening or further damage, that definitive surgical treatment would be required. Mr. Balaji Patel  voiced an appropriate understanding of our discussion, the options available to him, and of the expectations of his selected  treatment. I have had a thorough discussion with Mr. Balaji Patel regarding the treatment options available for his initially presenting bilateral carpal tunnel syndrome, which is causing him significant symptoms and difficulty. I have outlined for Mr. Balaji Patel the risk, benefits and consequences of the various treatment modalities, including a reasonable expectation for the long term success of each. We have discussed the likelihood that further, more aggressive treatment may be required for his current presenting condition. Based upon our current discussion and a reasonable understating of the options available to him, Mr. Balaji Patel has selected to proceed with a conservative plan of treatment consisting of: the use of protective splints, activity modification, and the judicious use of over-the-counter anti-inflammatory medications if allowed by his primary care physician. An appropriately sized Removable Carpal Tunnel Splint has been previously provided. Instructions were given regarding splint use and wear as well as suggestions for use of the other modalities were discussed. I have clearly explained to him that the above outlined treatment plan should not be expected to 'cure' his carpal tunnel syndrome, but we are rather treating the symptoms with which he presents.   He has understood that in order to achieve more durable relief of his symptoms and to prevent future worsening or further damage, that definitive surgical treatment would be required. Mr. Corby Goldman  voiced an appropriate understanding of our discussion, the options available to him, and of the expectations of his selected  treatment. I will ask Mr. Corby Goldman. to undergo his scheduled electrodiagnostic studies of the symptomatic both sides. I will ask that he schedule a follow-up appointment with me to review the results of this study after it has been completed.

## 2020-08-22 NOTE — PATIENT INSTRUCTIONS
Thank you for choosing [facility]) Physicians for your Hand and Upper Extremity needs. If we can be of any further assistance to you, please do not hesitate to contact us.     Office Phone Number:  [phone]  or  [phone]

## 2020-08-25 ENCOUNTER — HOSPITAL ENCOUNTER (OUTPATIENT)
Dept: NEUROLOGY | Age: 60
Discharge: HOME | End: 2020-08-25
Payer: MEDICARE

## 2020-08-25 PROCEDURE — 95910 NRV CNDJ TEST 7-8 STUDIES: CPT

## 2020-08-25 PROCEDURE — 95886 MUSC TEST DONE W/N TEST COMP: CPT

## 2020-08-25 NOTE — PROCEDURES
Test Date:  2020    Patient: Fady Yan : 1960 Physician: Adriano Walters DO   Sex: Male ID#:  Ref Phys: Lucius Duncan MD     Patient Complaints:  Patient is a 62year-old male who presents with pain in the right upper extremity from elbow to hand onset years ago with arthritis Also pain in left hand     Patient History / Exam:  PMH: no endocrine disease. + carpal tunnel surgery left hand years ago. PE: reflexes absent, + thumb opposition weakness    NCV & EMG Findings:  Evaluation of the left median (APB) motor and the right median (APB) motor nerves showed prolonged distal onset latency (L6.0, R5.1 ms) and decreased conduction velocity (L47, R49 m/s). The left ulnar (ADM) motor nerve showed prolonged distal onset latency (4.4 ms), decreased conduction velocity (Bel Elbow-Wrist, 47 m/s), and decreased conduction velocity (Abv Elbow-Bel Elbow, 42 m/s). The right ulnar (ADM) motor nerve showed decreased conduction velocity (Abv Elbow-Bel Elbow, 41 m/s). The left median sensory and the left ulnar sensory nerves showed no response. The right median sensory and the right ulnar sensory nerves showed prolonged distal peak latency (R4.2, R4.1 ms), reduced amplitude (R3, R5 µV), and decreased conduction velocity (R33, R34 m/s). All examined muscles (as indicated in the following table) showed no evidence of electrical instability. Impression: study is consistent with mild sensorimotor peripheral neuropathy. There is underlying carpal tunnel syndrome, and ulnar neuropathy at elbows. No evidence of an acute radiculopathy at this time. Thank you.          Adriano Walters DO        Nerve Conduction Studies  Motor Nerve Results      Latency Amplitude F-Lat Segment Distance CV Comment   Site (ms) Norm (mV) Norm (ms)  (cm) (m/s) Norm    Left Median (APB) Motor   Wrist 6.0  < 4.2 8.7  > 5.0         Elbow 11.3 - 8.1 -  Elbow-Wrist 25 47  > 50    Right Median (APB) Motor   Wrist 5.1  < 4.2 8.6  > 5.0         Elbow 10.2 - 8.2 -  Elbow-Wrist 25 49  > 50    Left Ulnar (ADM) Motor   Wrist 4.4  < 4.2 5.8  > 3.0         Bel Elbow 10.1 - 8.0 -  Bel Elbow-Wrist 27 47  > 50    Abv Elbow 12.0 - 7.5 -  Abv Elbow-Bel Elbow 8 42  > 48    Right Ulnar (ADM) Motor   Wrist 4.0  < 4.2 5.7  > 3.0         Bel Elbow 9.6 - 5.8 -  Bel Elbow-Wrist 28 50  > 50    Abv Elbow 11.3 - 5.6 -  Abv Elbow-Bel Elbow 7 41  > 48      Sensory Nerve Results      Latency (Peak) Amplitude (P-P) Segment Distance CV Comment   Site (ms) Norm (µV) Norm  (cm) (m/s) Norm    Left Median Sensory   Wrist-Dig II NR  < 3.6 NR  > 10 Wrist-Dig II 14 NR  > 39    Right Median Sensory   Wrist-Dig II 4.2  < 3.6 3  > 10 Wrist-Dig II 14 33  > 39    Left Ulnar Sensory   Wrist-Dig V NR  < 3.7 NR  > 15 Wrist-Dig V 14 NR  > 38    Right Ulnar Sensory   Wrist-Dig V 4.1  < 3.7 5  > 15 Wrist-Dig V 14 34  > 38        Electromyography     Side Muscle Nerve Root Ins Act Fibs Psw Amp Dur Poly Recrt Int Rajan Deep Comment   Right Deltoid Axillary C5-C6 Nml Nml Nml Nml Nml 0 Nml Nml    Right Biceps Musculocut C5-C6 Nml Nml Nml Nml Nml 0 Nml Nml    Right Triceps Radial C6-C8 Nml Nml Nml Nml Nml 0 Nml Nml    Right Brachiorad Radial C5-C6 Nml Nml Nml Nml Nml 0 Nml Nml    Right Pronator Teres Median C6-C7 Nml Nml Nml Nml Nml 0 Nml Nml    Right EIP Post Interosseous,  R... C7-C8 Nml Nml Nml Nml Nml 0 Nml Nml    Right APB Median C8-T1 Nml Nml Nml Nml Nml 0 Nml Nml    Right FDI Ulnar C8-T1 Nml Nml Nml Nml Nml 0 Nml Nml    Right Cervical Paraspinal (Uppe. .. Rami C1-C3 Nml Nml Nml         Right Cervical Paraspinal (Mid) Rami C4-C6 Nml Nml Nml         Right Cervical Paraspinal (Jenness Hymen. ..  Rami C7-C8 Nml Nml Nml         Left Deltoid Axillary C5-C6 Nml Nml Nml Nml Nml 0 Nml Nml    Left Biceps Musculocut C5-C6 Nml Nml Nml Nml Nml 0 Nml Nml    Left Triceps Radial C6-C8 Nml Nml Nml Nml Nml 0 Nml Nml    Left Brachiorad Radial C5-C6 Nml Nml Nml Nml Nml 0 Nml Nml    Left Pronator Teres Median C6-C7 Nml Nml Nml Nml Nml 0 Nml Nml    Left EIP Post Interosseous,  R... C7-C8 Nml Nml Nml Nml Nml 0 Nml Nml    Left APB Median C8-T1 Nml Nml Nml Nml Nml 0 Nml Nml    Left FDI Ulnar C8-T1 Nml Nml Nml Nml Nml 0 Nml Nml    Left Cervical Paraspinal (Uppe. .. Rami C1-C3 Nml Nml Nml         Left Cervical Paraspinal (Mid) Rami C4-C6 Nml Nml Nml         Left Cervical Paraspinal (Jessenia May. ..  Rami C7-C8 Nml Nml Nml               Electronically signed by Tyler Hanson DO on 8/25/2020 at 10:25 AM

## 2020-09-04 ENCOUNTER — OFFICE VISIT (OUTPATIENT)
Dept: ORTHOPEDIC SURGERY | Age: 60
End: 2020-09-04
Payer: MEDICARE

## 2020-09-04 VITALS — RESPIRATION RATE: 16 BRPM | WEIGHT: 210 LBS | TEMPERATURE: 97.4 F | BODY MASS INDEX: 24.79 KG/M2 | HEIGHT: 77 IN

## 2020-09-04 PROBLEM — G56.23 CUBITAL TUNNEL SYNDROME, BILATERAL: Status: ACTIVE | Noted: 2020-09-04

## 2020-09-04 PROCEDURE — 99214 OFFICE O/P EST MOD 30 MIN: CPT | Performed by: PHYSICIAN ASSISTANT

## 2020-09-04 NOTE — PROGRESS NOTES
Mr. Jeannette Sutton returns today in follow-up of his previously treated  bilateral Cubital Tunnel Syndrome. He was last seen be Dr. Krystle Snyder. Elvia Morrissey in August, 2020 at which time he was treated with electrodiagnostic studies were ordered. He experienced no relief of his initial symptoms. He  has noticed symptom persistence over the last several weeks. He returns today with unchanged symptoms of bilateral numbness in the Ulnar Innervated digits and tingling in the Ulnar  Innervated digits, requesting further treatment. The patient's , past medical history, medications, allergies,  family history, social history, and review of systems have been reviewed and are recorded in the chart. Physical Exam:  Vitals  Temp: 97.4 °F (36.3 °C)  Temp Source: Infrared  Resp: 16  Height: 6' 5\" (195.6 cm)  Weight: 210 lb (95.3 kg)  Mr. Jeannette Sutton appears well, he is in no apparent distress, he demonstrates appropriate mood & affect. Skin: Normal in appearance, Normal Color and Free of Lesions Bilaterally   Digital range of motion is Full and equal bilaterally bilaterally  Wrist range of motion is Full and equal bilaterally bilaterally  Elbow range of motion is Full and equal bilaterally bilaterally  There is no evidence of gross joint instability bilaterally. Sensation is subjectively tingling in the Median Innervated Digits and Ulnar Innervated Digits bilaterally and objectively present in the same distribution bilaterally  Vascular examination reveals normal, good capillary refill and good color bilaterally  Swelling is mild in the medial elbow, there is mild tenderness at the medial epicondyle bilaterally  Examination for Cubital Tunnel Syndrome shows mild tenderness to palpation at the medial epicondyle bilaterally. The Ulnar Nerve rests behind the medial epicondyle without subluxation upon elbow flexion bilaterally.   Elbow flexion-compression test is negative , and there is an active Tinnel's Sign over the Cubital Tunnel bilaterally. The Ulnar Nerve innervated intrinsic musculature is not atrophied & weakened bilaterally    Review of Electrodiagnostic Testing:  Test performed on: 08/25/2020    NERVE CONDUCTION STUDY:  RIGHT   Median Nerve: Sensory Latency: 4.2  Motor Latency: 5.1  Ulnar Nerve:  Conduction Velocity:  41    LEFT  Median Nerve: Sensory Latency: NR  Motor Latency: 6.0  Ulnar Nerve:  Conduction Velocity:  42    EMG:  RIGHT  Normal    LEFT  Normal            Impression:  Mr. Keyla Vera is showing evidence of persistent Cubital Tunnel Syndrome and carpal tunnel syndrome after previous treatment. He requests additional treatment at this time. Plan:    I have had a thorough discussion with Mr. Keyla Vera regarding the treatment options available for his unchanged cubital tunnel syndrome, which is causing him significant  limitations. I have outlined for Mr. Keyla Vera the benefits and consequences of the various treatment modalities, including the fact that surgical treatment is the only modality which is reasonably expected to provide long lasting or permanent resolution of his symptoms. Based upon our current discussion and a reasonable understating of the options available to him, Mr. Keyla Vera has selected to proceed with surgical Ulnar Nerve decompression at the Cubital Tunnel. I have discussed the details of the surgical procedure, the pre, ester and postoperative concerns and the appropriate expectations after surgery with Mr. Keyla Vera today. He was given the opportunity to ask questions, voiced an understanding of the procedure, and he did wish to proceed with Staged Bilateral Ulnar Nerve decompression at the Cubital Tunnel. I had an extensive discussion with Mr. Keyla Vera (and any family members present with him today) regarding the natural history, etiology, and long term consequences of this problem.   We have mutually selected a surgical treatment plan  and, in my He has had full opportunity to ask his questions. I have answered them all to his satisfaction. I feel that Mr. Ap Ball (and any family members present with him today) do understand our discussion today and he has provided informed consent for Staged Bilateral Carpal Tunnel Release. I have also discussed with Mr. Ap Ball the other treatment options available to him for this condition. We have today selected to proceed with Surgical treatment. He has voiced and  understanding that there are other less aggressive treatment options which are available in this situation, albeit possibly less efficacious or durable, and he is comfortable with the plan that he has chosen. I have explained to Mr. Ap Ball that despite successful treatment (surgical decompression or otherwise) of his nerve entrapment, that due to his severe nerve damage, that he is likely to have some permanent residual symptoms that do not improve long term. I have also explained that maximal recovery of nerve function may take a full year or longer to realize. He voiced a clear understanding of this. I have explained to Mr. Ap Ball that despite successful treatment (surgical or otherwise) of his current presenting condition, that due to his coexistent conditions (both diagnosed and undiagnosed), that he is likely to have some permanent residual symptoms related to these conditions that do not improve long term. I have also explained that maximal recovery of function & symptom improvement may take a full year or longer to realize. He voiced a clear understanding of this. Mr. Ap Ball has been given a full verbal list of instructions and precautions related to his present condition. I have asked him to followup with me in the office at the prescribed time.  He is also specifically requested to call or return to the office sooner if his symptoms change or worsen prior to the next scheduled appointment.

## 2020-09-04 NOTE — LETTER
[id number] [facility] - HAND SURGERY  Surgery Scheduling Form:      DEMOGRAPHICS:                                                                                                              .    Patient Name:  Corby Goldman  Patient :  1960   Patient SS#:  [id number]    Patient Phone:  [phone] (home)     Patient Address:  [address] [place] [address]    PCP:  Shandra Palmer MD  Insurance:   Payor/Plan Subscr  Sex Relation Sub. Ins. ID Effective Group Num   1. ADVANTAGE BUC* DANE ESCUDERO 1960 Male Self [id number]                                    [address]     DIAGNOSIS & PROCEDURE:                                                                                            .  Diagnosis:   Left Cubital Tunnel Syndrome / Ulnar Nerve Entrapment @ Elbow (354.2) and  left Carpal Tunnel Syndrome    G56.02  Operation:  left  Ulnar Nerve Decompression  (at Elbow) and  left Carpal Tunnel Release  [CPT: 82553 + 60088]  Location:  [facility] AT [place]  Surgeon:  Renita Kong    SCHEDULING INFORMATION:                                                                                         .    Surgeon's Scheduling Instruction:  elective    RN Post-op Appt:  [x] Yes   [] No  Preferred Thursday:   [] Yes   [x] No    Requested Date:    OR Time:   8:30 Patient Arrival Time:  7;00    OR Time Required:  20  Minutes  Anesthesia:  General  Equipment:  None                                 COVID  9-16  Mini C-Arm:  No   Standard C-Arm:  No  Status:  outpatient  PAT Required:  Yes  Comments:                      Denise Ghotra. Ayad Albarado MD  20     BILLING INFORMATION:                                                                                                    .    Procedure:       CPT Code Modifier  left  Ulnar Nerve Decompression  (at Elbow) &  left Carpal Tunnel Release      .  37814  06465        Pre Operative Physician Prophylaxis Orders - SCIP Protocols Pre-Operative Antibiotic Order:    No Known Allergies       [x]  ----  No Antibiotic Ordered       []  ----  Give the following Antibiotic within 1 hour prior to start time:         Ancef 1 gram IV if patient is less than 200 pounds    or       Ancef 2 grams IV if patient is greater than 200 pounds    or      Vancomycin 1 gram IV (over 1 hour) if patient is allergic to           PENICILLINS or CEFALOSPORINS        SURG  9-              COVID 9-16               H&P                PCP __XX__    Procedure: left  Ulnar Nerve Decompression  (at Elbow) &  left Carpal Tunnel Release  Patient: Radha Constantino  :    1960    Physician Signature:     Date: 20  Time: 9:35 AM

## 2020-09-05 PROBLEM — G56.23 ULNAR NEUROPATHY OF BOTH UPPER EXTREMITIES: Status: ACTIVE | Noted: 2020-09-05

## 2020-09-05 PROBLEM — G56.03 BILATERAL CARPAL TUNNEL SYNDROME: Status: ACTIVE | Noted: 2020-09-05

## 2020-09-08 ENCOUNTER — TELEPHONE (OUTPATIENT)
Dept: PRIMARY CARE CLINIC | Age: 60
End: 2020-09-08

## 2020-09-08 NOTE — TELEPHONE ENCOUNTER
----- Message from Courtney Ferraro sent at 9/8/2020 10:40 AM EDT -----  Subject: Appointment Request    Reason for Call: Routine Pre-Op    QUESTIONS  Type of Appointment? Established Patient  Reason for appointment request? No appointments available during search  Additional Information for Provider? Pt needs pre-op physical for upcoming   surgery on 9/22/2020. No appts found during appt search. Surgery is for   left wrist and elbow and will be at [facility]. No EKG requested. Pt   requested either Dr. Irene Runner or Dr. Yung Mcgee. Ananth green. ---------------------------------------------------------------------------  --------------  Flynn QUINTERO  What is the best way for the office to contact you? OK to leave message on   voicemail  Preferred Call Back Phone Number? [phone]  ---------------------------------------------------------------------------  --------------  SCRIPT ANSWERS  Relationship to Patient? Self  Appointment reason? Symptomatic  Select script based on patient symptoms? Adult Pre-Op  Do you have question for your provider that need to be answered prior to   scheduling your pre-op appointment? No  Have you been diagnosed with   tested for   or told that you are suspected of having COVID-19 (Coronavirus)? No  Have you had a fever or taken medication to treat a fever within the past   3 days? No  Have you had a cough   shortness of breath or flu-like symptoms within the past 3 days? No  Do you currently have flu-like symptoms including fever or chills   cough   shortness of breath   or difficulty breathing   or new loss of taste or smell? No  (Service Expert  click yes below to proceed with [facility] As Usual   Scheduling)?  Yes

## 2020-09-15 NOTE — PROGRESS NOTES
C-Difficile admission screening and protocol:     * Admitted with diarrhea? n     *Prior history of C-Diff. In last 3 months?n     *Antibiotic use in the past 6-8 weeks?n     *Prior hospitalization or nursing home in the last month?n        [address] time______7______        Surgery time____________    Take the following medications with a sip of water:    Do not eat or drink anything after 12:00 midnight prior to your surgery. This includes water chewing gum, mints and ice chips. You may brush your teeth and gargle the morning of your surgery, but do not swallow the water     Please see your family doctor/pediatrician for a history and physical and/or concerning medications. Bring any test results/reports from your physicians office. If you are under the care of a heart doctor or specialist doctor, please be aware that you may be asked to them for clearance    You may be asked to stop blood thinners such as Coumadin, Plavix, Fragmin, Lovenox, etc., or any anti-inflammatories such as:  Aspirin, Ibuprofen, Advil, Naproxen prior to your surgery. We also ask that you stop any OTC medications such as fish oil, vitamin E, glucosamine, garlic, Multivitamins, COQ 10, etc.    We ask that you do not smoke 24 hours prior to surgery  We ask that you do not  drink any alcoholic beverages 24 hours prior to surgery     You must make arrangements for a responsible adult to take you home after your surgery. For your safety you will not be allowed to leave alone or drive yourself home. Your surgery will be cancelled if you do not have a ride home. Also for your safety, it is strongly suggested that someone stay with you the first 24 hours after your surgery. A parent or legal guardian must accompany a child scheduled for surgery and plan to stay at the hospital until the child is discharged. Please do not bring other children with you.     For your comfort, please wear simple loose fitting clothing to the hospital.  Please do not bring valuables. Do not wear any make-up or nail polish on your fingers or toes      For your safety, please do not wear any jewelry or body piercing's on the day of surgery. All jewelry must be removed. If you have dentures, they will be removed before going to operating room. For your convenience, we will provide you with a container. If you wear contact lenses or glasses, they will be removed, please bring a case for them. If you have a living will and a durable power of  for healthcare, please bring in a copy. As part of our patient safety program to minimize surgical site infections, we ask you to do the following:    · Please notify your surgeon if you develop any illness between         now and the  day of your surgery. · This includes a cough, cold, fever, sore throat, nausea,         or vomiting, and diarrhea, etc.  ·  Please notify your surgeon if you experience dizziness, shortness         of breath or blurred vision between now and the time of your surgery. Do not shave your operative site 96 hours prior to surgery. For face and neck surgery, men may use an electric razor 48 hours   prior to surgery. You may shower the night before surgery or the morning of   your surgery with an antibacterial soap. You will need to bring a photo ID and insurance card    [facility] has an onsite pharmacy, would you like to utilize our pharmacy     If you will be staying overnight and use a C-pap machine, please bring   your C-pap to hospital     Our goal is to provide you with excellent care, therefore, visitors will be limited to two(2) in the room at a time so that we may focus on providing this care for you. Please contact pre-admission testing if you have any further questions.                  [facility] phone number:  [phone] PAT fax number:  [phone]  Please note these are generalized instructions for all surgical cases, you may be provided with more specific instructions according to your surgery.

## 2020-09-15 NOTE — PROGRESS NOTES
Preoperative Screening for Elective Surgery/Invasive Procedures While COVID-19 present in the community     Have you tested positive or have been told to self-isolate for COVID-19 like symptoms within the past 28 days? n   Do you currently have any of the following symptoms? o Fever >100.0 F or 99.9 F in immunocompromised patients?n  o New onset cough, shortness of breath or difficulty breathing?n  o New onset sore throat, myalgia (muscle aches and pains), headache, loss of taste/smell or diarrhea? n   Have you had a potential exposure to COVID-19 within the past 14 days by:  o Close contact with a confirmed case?n  o Close contact with a healthcare worker,  or essential infrastructure worker (grocery store, restaurant, gas station, public utilities or transportation)? no  o Do you reside in a congregate setting such as; skilled nursing facility, adult home, correctional facility, homeless shelter or other institutional setting? no  o Have you had recent travel to a known COVID-19 hotspot? isabelle rodriguez    Indicate if the patient has a positive screen by answering yes to one or more of the above questions. Patients who test positive or screen positive prior to surgery or on the day of surgery should be evaluated in conjunction with the surgeon/proceduralist/anesthesiologist to determine the urgency of the procedure.

## 2020-09-16 ENCOUNTER — OFFICE VISIT (OUTPATIENT)
Dept: PRIMARY CARE CLINIC | Age: 60
End: 2020-09-16
Payer: MEDICARE

## 2020-09-16 VITALS
SYSTOLIC BLOOD PRESSURE: 131 MMHG | HEIGHT: 77 IN | BODY MASS INDEX: 26.33 KG/M2 | RESPIRATION RATE: 17 BRPM | DIASTOLIC BLOOD PRESSURE: 86 MMHG | HEART RATE: 68 BPM | TEMPERATURE: 97.3 F | WEIGHT: 223 LBS | OXYGEN SATURATION: 98 %

## 2020-09-16 PROCEDURE — 93000 ELECTROCARDIOGRAM COMPLETE: CPT | Performed by: FAMILY MEDICINE

## 2020-09-16 PROCEDURE — 99211 OFF/OP EST MAY X REQ PHY/QHP: CPT | Performed by: NURSE PRACTITIONER

## 2020-09-16 PROCEDURE — 99212 OFFICE O/P EST SF 10 MIN: CPT | Performed by: FAMILY MEDICINE

## 2020-09-16 ASSESSMENT — ENCOUNTER SYMPTOMS
SINUS PRESSURE: 0
CHOKING: 0
EYE DISCHARGE: 0
APNEA: 0
CHEST TIGHTNESS: 0
TROUBLE SWALLOWING: 0
FACIAL SWELLING: 0
ABDOMINAL PAIN: 0
BLOOD IN STOOL: 0
COLOR CHANGE: 0
EYE ITCHING: 0
VOMITING: 0
VOICE CHANGE: 0
ANAL BLEEDING: 0
NAUSEA: 0
RECTAL PAIN: 0
COUGH: 0
PHOTOPHOBIA: 0
WHEEZING: 0
CONSTIPATION: 0
SORE THROAT: 0
BACK PAIN: 0
EYE REDNESS: 0
EYE PAIN: 0
SHORTNESS OF BREATH: 0

## 2020-09-16 NOTE — PROGRESS NOTES
Subjective:      Patient ID: Belkis Nielsen is a 62 y.o. male. Pre  Op exam  HPI 61 y/o male known hypertensive, gouty arthritis, seronegative RA, DDD lumbar  Spine, Cervical disc disease, here for pre op exam for scheduled for CTS surgery per Dr Sam Hartley 9/22/2020  Left side and second surgery of right side 3 weeks later. [facility].  He does have pain and numbness both hands/wrist/elbows. He denies chest pain, heart disease, stroke, kidney disease, or diabetes. He has controlled hypertension    No problems with previous general anesthesia or bleeding issues. Current Outpatient Medications   Medication Sig Dispense Refill    amLODIPine (NORVASC) 2.5 MG tablet Take 1 tablet by mouth daily 90 tablet 1    lisinopril (PRINIVIL;ZESTRIL) 20 MG tablet TAKE 1 TABLET BY MOUTH DAILY 90 tablet 0    allopurinol (ZYLOPRIM) 100 MG tablet Take 2 tablets by mouth daily And cancel script for one tab a day 60 tablet 5    zoster recombinant adjuvanted vaccine (SHINGRIX) 50 MCG/0.5ML SUSR injection Inject 0.5 mLs into the muscle      hydroxychloroquine (PLAQUENIL) 200 MG tablet Take 200 mg by mouth 2 times daily      gabapentin (NEURONTIN) 600 MG tablet Take 1 tablet by mouth 3 times daily for 29 days. 90 tablet 5     No current facility-administered medications for this visit.       Past Medical History:   Diagnosis Date    Arthropathy     TISHA positive    Back pain     Depression     Gout     Hyperlipidemia     Hypertension     Wears dentures        Social History     Socioeconomic History    Marital status:      Spouse name: Not on file    Number of children: Not on file    Years of education: Not on file    Highest education level: Not on file   Occupational History    Occupation: Disabled     Comment: 2014   Social Needs    Financial resource strain: Not on file    Food insecurity     Worry: Not on file     Inability: Not on file   [employer] needs     Medical: Not on file     Non-medical: Not on file   Tobacco Use    Smoking status: Never Smoker    Smokeless tobacco: Never Used   Substance and Sexual Activity    Alcohol use: No    Drug use: No    Sexual activity: Not on file   Lifestyle    Physical activity     Days per week: Not on file     Minutes per session: Not on file    Stress: Not on file   Relationships    Social connections     Talks on phone: Not on file     Gets together: Not on file     Attends Worship service: Not on file     Active member of club or organization: Not on file     Attends meetings of clubs or organizations: Not on file     Relationship status: Not on file    Intimate partner violence     Fear of current or ex partner: Not on file     Emotionally abused: Not on file     Physically abused: Not on file     Forced sexual activity: Not on file   Other Topics Concern    Not on file   Social History Narrative    Not on file     Past Surgical History:   Procedure Laterality Date    COLONOSCOPY      ENDOSCOPY, COLON, DIAGNOSTIC      LUMBAR FUSION  2017    PLIF L3-S1 done in [place], [place]   No Known Allergies  Family History   Problem Relation Age of Onset    Cancer Mother     Hypertension Mother     Diabetes Maternal Uncle            Review of Systems   Constitutional: Negative for activity change, appetite change, chills, diaphoresis, fatigue, fever and unexpected weight change. HENT: Negative for congestion, dental problem, drooling, ear discharge, ear pain, facial swelling, hearing loss, mouth sores, nosebleeds, postnasal drip, sinus pressure, sneezing, sore throat, tinnitus, trouble swallowing and voice change. Eyes: Negative for photophobia, pain, discharge, redness, itching and visual disturbance. Respiratory: Negative for apnea, cough, choking, chest tightness, shortness of breath and wheezing. Cardiovascular: Negative for chest pain, palpitations and leg swelling.    Gastrointestinal: Negative for abdominal pain, anal bleeding, blood in stool, constipation, nausea, rectal pain and vomiting. Genitourinary: Negative for decreased urine volume, difficulty urinating, discharge, dysuria, enuresis, flank pain, frequency, hematuria, penile swelling, scrotal swelling, testicular pain and urgency. Musculoskeletal: Negative for arthralgias, back pain, gait problem, joint swelling, myalgias, neck pain and neck stiffness. Skin: Negative for color change, pallor, rash and wound. Neurological: Negative for dizziness, tremors, seizures, syncope, facial asymmetry, speech difficulty, weakness, light-headedness, numbness and headaches. Bilateral numbness, pain both wrists, hands, elbows,forearms areas   Hematological: Negative for adenopathy. Does not bruise/bleed easily. Psychiatric/Behavioral: Negative for agitation, behavioral problems, confusion, decreased concentration, dysphoric mood, hallucinations, self-injury, sleep disturbance and suicidal ideas. The patient is not nervous/anxious and is not hyperactive. Objective:   Physical Exam  Vitals signs and nursing note reviewed. Constitutional:       General: He is not in acute distress. Appearance: He is well-developed. He is not diaphoretic. HENT:      Head: Normocephalic and atraumatic. Right Ear: External ear normal.      Left Ear: External ear normal.      Nose: Nose normal.      Mouth/Throat:      Pharynx: No oropharyngeal exudate. Eyes:      General: No scleral icterus. Right eye: No discharge. Left eye: No discharge. Conjunctiva/sclera: Conjunctivae normal.      Pupils: Pupils are equal, round, and reactive to light. Neck:      Musculoskeletal: Normal range of motion and neck supple. Thyroid: No thyromegaly. Vascular: No JVD. Trachea: No tracheal deviation. Cardiovascular:      Rate and Rhythm: Normal rate and regular rhythm. Pulses:           Carotid pulses are 2+ on the right side and 2+ on the left side.        Radial pulses are 2+ on the right side and 2+ on the left side. Femoral pulses are 2+ on the right side and 2+ on the left side. Popliteal pulses are 2+ on the right side and 2+ on the left side. Dorsalis pedis pulses are 2+ on the right side and 2+ on the left side. Posterior tibial pulses are 2+ on the right side and 2+ on the left side. Heart sounds: Normal heart sounds. No murmur. No friction rub. Pulmonary:      Effort: Pulmonary effort is normal. No respiratory distress. Breath sounds: Normal breath sounds. No stridor. No wheezing or rales. Chest:      Chest wall: No tenderness. Abdominal:      General: Bowel sounds are normal. There is no distension. Palpations: Abdomen is soft. There is no mass. Tenderness: There is no abdominal tenderness. There is no guarding or rebound. Musculoskeletal: Normal range of motion. General: No tenderness. Lymphadenopathy:      Cervical: No cervical adenopathy. Skin:     General: Skin is warm and dry. Coloration: Skin is not pale. Findings: No rash. Neurological:      Mental Status: He is oriented to person, place, and time. Cranial Nerves: No cranial nerve deficit. Motor: No abnormal muscle tone. Coordination: Coordination normal.      Deep Tendon Reflexes: Reflexes normal.      Comments: Negative Tinel  Negative Phalen   Psychiatric:         Behavior: Behavior normal.         Thought Content: Thought content normal.         Judgment: Judgment normal.       Lab Results   Component Value Date    CREATININE 0.9 08/14/2020    BUN 12 08/14/2020     08/14/2020    K 4.7 08/14/2020     08/14/2020    CO2 23 08/14/2020     Lab Results   Component Value Date    WBC 9.0 08/14/2020    HGB 14.5 08/14/2020    HCT 44.2 08/14/2020    MCV 83.9 08/14/2020     08/14/2020         Lab Results   Component Value Date    LABURIC 5.9 08/14/2020       Assessment:      1.  Pre-op exam  Medically clear for surgery  - EKG 12 lead    2. Elevated glucose  112  R/o pre diabetes  - HEMOGLOBIN A1C; Future    3. Bilateral carpal tunnel syndrome      4. Ulnar neuropathy of both upper extremities      5. Cubital tunnel syndrome, bilateral      6.  Essential hypertension    bp at goal < 140/90            Plan:      Labs/ekg reviewed  Medially clear for surgery    Consult note sent to surgeon via Raphael Leija MD

## 2020-09-16 NOTE — PROGRESS NOTES
Patient presented to [facility] drive up clinic for preop testing. Patient was swabbed and given information advising them to remain isolated until procedure date.

## 2020-09-17 LAB — SARS-COV-2, NAA: NOT DETECTED

## 2020-09-21 ENCOUNTER — ANESTHESIA EVENT (OUTPATIENT)
Dept: OPERATING ROOM | Age: 60
End: 2020-09-21
Payer: MEDICARE

## 2020-09-21 DIAGNOSIS — R73.09 ELEVATED GLUCOSE: ICD-10-CM

## 2020-09-22 ENCOUNTER — ANESTHESIA (OUTPATIENT)
Dept: OPERATING ROOM | Age: 60
End: 2020-09-22
Payer: MEDICARE

## 2020-09-22 ENCOUNTER — HOSPITAL ENCOUNTER (OUTPATIENT)
Age: 60
Setting detail: OUTPATIENT SURGERY
Discharge: HOME | End: 2020-09-22
Attending: ORTHOPAEDIC SURGERY | Admitting: ORTHOPAEDIC SURGERY
Payer: MEDICARE

## 2020-09-22 VITALS
OXYGEN SATURATION: 100 % | SYSTOLIC BLOOD PRESSURE: 105 MMHG | TEMPERATURE: 96.8 F | DIASTOLIC BLOOD PRESSURE: 66 MMHG | RESPIRATION RATE: 10 BRPM

## 2020-09-22 VITALS
RESPIRATION RATE: 16 BRPM | SYSTOLIC BLOOD PRESSURE: 124 MMHG | OXYGEN SATURATION: 100 % | BODY MASS INDEX: 24.73 KG/M2 | DIASTOLIC BLOOD PRESSURE: 86 MMHG | HEIGHT: 77 IN | HEART RATE: 76 BPM | WEIGHT: 209.44 LBS | TEMPERATURE: 97 F

## 2020-09-22 LAB
ESTIMATED AVERAGE GLUCOSE: 122.6 MG/DL
HBA1C MFR BLD: 5.9 %

## 2020-09-22 PROCEDURE — 2580000003 HC RX 258: Performed by: ANESTHESIOLOGY

## 2020-09-22 PROCEDURE — 3700000001 HC ADD 15 MINUTES (ANESTHESIA): Performed by: ORTHOPAEDIC SURGERY

## 2020-09-22 PROCEDURE — 7100000011 HC PHASE II RECOVERY - ADDTL 15 MIN: Performed by: ORTHOPAEDIC SURGERY

## 2020-09-22 PROCEDURE — 3700000000 HC ANESTHESIA ATTENDED CARE: Performed by: ORTHOPAEDIC SURGERY

## 2020-09-22 PROCEDURE — 2500000003 HC RX 250 WO HCPCS: Performed by: ORTHOPAEDIC SURGERY

## 2020-09-22 PROCEDURE — 2709999900 HC NON-CHARGEABLE SUPPLY: Performed by: ORTHOPAEDIC SURGERY

## 2020-09-22 PROCEDURE — 2500000003 HC RX 250 WO HCPCS: Performed by: NURSE ANESTHETIST, CERTIFIED REGISTERED

## 2020-09-22 PROCEDURE — 7100000000 HC PACU RECOVERY - FIRST 15 MIN: Performed by: ORTHOPAEDIC SURGERY

## 2020-09-22 PROCEDURE — 7100000001 HC PACU RECOVERY - ADDTL 15 MIN: Performed by: ORTHOPAEDIC SURGERY

## 2020-09-22 PROCEDURE — 7100000010 HC PHASE II RECOVERY - FIRST 15 MIN: Performed by: ORTHOPAEDIC SURGERY

## 2020-09-22 PROCEDURE — 3600000015 HC SURGERY LEVEL 5 ADDTL 15MIN: Performed by: ORTHOPAEDIC SURGERY

## 2020-09-22 PROCEDURE — 6360000002 HC RX W HCPCS: Performed by: NURSE ANESTHETIST, CERTIFIED REGISTERED

## 2020-09-22 PROCEDURE — 2580000003 HC RX 258: Performed by: ORTHOPAEDIC SURGERY

## 2020-09-22 PROCEDURE — 3600000005 HC SURGERY LEVEL 5 BASE: Performed by: ORTHOPAEDIC SURGERY

## 2020-09-22 RX ORDER — ONDANSETRON 2 MG/ML
INJECTION INTRAMUSCULAR; INTRAVENOUS PRN
Status: DISCONTINUED | OUTPATIENT
Start: 2020-09-22 | End: 2020-09-22 | Stop reason: SDUPTHER

## 2020-09-22 RX ORDER — MAGNESIUM HYDROXIDE 1200 MG/15ML
LIQUID ORAL CONTINUOUS PRN
Status: COMPLETED | OUTPATIENT
Start: 2020-09-22 | End: 2020-09-22

## 2020-09-22 RX ORDER — SODIUM CHLORIDE 0.9 % (FLUSH) 0.9 %
10 SYRINGE (ML) INJECTION EVERY 12 HOURS SCHEDULED
Status: DISCONTINUED | OUTPATIENT
Start: 2020-09-22 | End: 2020-09-22 | Stop reason: HOSPADM

## 2020-09-22 RX ORDER — PROPOFOL 10 MG/ML
INJECTION, EMULSION INTRAVENOUS PRN
Status: DISCONTINUED | OUTPATIENT
Start: 2020-09-22 | End: 2020-09-22 | Stop reason: SDUPTHER

## 2020-09-22 RX ORDER — SODIUM CHLORIDE 9 MG/ML
INJECTION, SOLUTION INTRAVENOUS CONTINUOUS
Status: DISCONTINUED | OUTPATIENT
Start: 2020-09-22 | End: 2020-09-22 | Stop reason: HOSPADM

## 2020-09-22 RX ORDER — SODIUM CHLORIDE 0.9 % (FLUSH) 0.9 %
10 SYRINGE (ML) INJECTION PRN
Status: DISCONTINUED | OUTPATIENT
Start: 2020-09-22 | End: 2020-09-22 | Stop reason: HOSPADM

## 2020-09-22 RX ORDER — BUPIVACAINE HYDROCHLORIDE 5 MG/ML
INJECTION, SOLUTION EPIDURAL; INTRACAUDAL
Status: COMPLETED | OUTPATIENT
Start: 2020-09-22 | End: 2020-09-22

## 2020-09-22 RX ORDER — DEXAMETHASONE SODIUM PHOSPHATE 4 MG/ML
INJECTION, SOLUTION INTRA-ARTICULAR; INTRALESIONAL; INTRAMUSCULAR; INTRAVENOUS; SOFT TISSUE PRN
Status: DISCONTINUED | OUTPATIENT
Start: 2020-09-22 | End: 2020-09-22 | Stop reason: SDUPTHER

## 2020-09-22 RX ORDER — MIDAZOLAM HYDROCHLORIDE 1 MG/ML
INJECTION INTRAMUSCULAR; INTRAVENOUS PRN
Status: DISCONTINUED | OUTPATIENT
Start: 2020-09-22 | End: 2020-09-22 | Stop reason: SDUPTHER

## 2020-09-22 RX ORDER — LIDOCAINE HYDROCHLORIDE 20 MG/ML
INJECTION, SOLUTION EPIDURAL; INFILTRATION; INTRACAUDAL; PERINEURAL PRN
Status: DISCONTINUED | OUTPATIENT
Start: 2020-09-22 | End: 2020-09-22 | Stop reason: SDUPTHER

## 2020-09-22 RX ORDER — FENTANYL CITRATE 50 UG/ML
INJECTION, SOLUTION INTRAMUSCULAR; INTRAVENOUS PRN
Status: DISCONTINUED | OUTPATIENT
Start: 2020-09-22 | End: 2020-09-22 | Stop reason: SDUPTHER

## 2020-09-22 RX ADMIN — SODIUM CHLORIDE: 9 INJECTION, SOLUTION INTRAVENOUS at 07:58

## 2020-09-22 RX ADMIN — FENTANYL CITRATE 25 MCG: 50 INJECTION INTRAMUSCULAR; INTRAVENOUS at 08:23

## 2020-09-22 RX ADMIN — PROPOFOL 200 MG: 10 INJECTION, EMULSION INTRAVENOUS at 08:15

## 2020-09-22 RX ADMIN — FENTANYL CITRATE 25 MCG: 50 INJECTION INTRAMUSCULAR; INTRAVENOUS at 08:32

## 2020-09-22 RX ADMIN — SODIUM CHLORIDE: 9 INJECTION, SOLUTION INTRAVENOUS at 08:11

## 2020-09-22 RX ADMIN — LIDOCAINE HYDROCHLORIDE 100 MG: 20 INJECTION, SOLUTION EPIDURAL; INFILTRATION; INTRACAUDAL; PERINEURAL at 08:15

## 2020-09-22 RX ADMIN — FENTANYL CITRATE 25 MCG: 50 INJECTION INTRAMUSCULAR; INTRAVENOUS at 08:36

## 2020-09-22 RX ADMIN — MIDAZOLAM 2 MG: 1 INJECTION INTRAMUSCULAR; INTRAVENOUS at 08:13

## 2020-09-22 RX ADMIN — FENTANYL CITRATE 25 MCG: 50 INJECTION INTRAMUSCULAR; INTRAVENOUS at 08:28

## 2020-09-22 RX ADMIN — DEXAMETHASONE SODIUM PHOSPHATE 8 MG: 4 INJECTION, SOLUTION INTRAMUSCULAR; INTRAVENOUS at 08:19

## 2020-09-22 RX ADMIN — ONDANSETRON 4 MG: 2 INJECTION INTRAMUSCULAR; INTRAVENOUS at 08:20

## 2020-09-22 ASSESSMENT — PULMONARY FUNCTION TESTS
PIF_VALUE: 15
PIF_VALUE: 2
PIF_VALUE: 1
PIF_VALUE: 15
PIF_VALUE: 2
PIF_VALUE: 13
PIF_VALUE: 2
PIF_VALUE: 14
PIF_VALUE: 2
PIF_VALUE: 2
PIF_VALUE: 13
PIF_VALUE: 3
PIF_VALUE: 2
PIF_VALUE: 0
PIF_VALUE: 1
PIF_VALUE: 15
PIF_VALUE: 0
PIF_VALUE: 15
PIF_VALUE: 15
PIF_VALUE: 2
PIF_VALUE: 2

## 2020-09-22 ASSESSMENT — PAIN SCALES - WONG BAKER: WONGBAKER_NUMERICALRESPONSE: 0

## 2020-09-22 ASSESSMENT — PAIN SCALES - GENERAL
PAINLEVEL_OUTOF10: 0

## 2020-09-22 ASSESSMENT — LIFESTYLE VARIABLES: SMOKING_STATUS: 0

## 2020-09-22 ASSESSMENT — PAIN - FUNCTIONAL ASSESSMENT: PAIN_FUNCTIONAL_ASSESSMENT: 0-10

## 2020-09-22 NOTE — H&P
Pre-operative Update of H&P:    I  have seen & examined . Jerel Cummings related solely to his hand and upper extremity conditions, prior to the scheduled procedure on the date of his surgery. The indications for the planned surgical procedure & and his upper-extremity condition are unchanged.

## 2020-09-22 NOTE — PROGRESS NOTES
From PACU. Alert and oriented. No c/o. Vss. Dressing is clean dry intact. Fingers are warm, move well, hira well.

## 2020-09-22 NOTE — PROGRESS NOTES
Pt unresponsive on arrival to PACU with OPA in place and O2 at 3L. No sign of pain at present. Elevated left arm.

## 2020-09-22 NOTE — PROGRESS NOTES
Pt awakens to voice. Bends and straightens left fingers and hand as directed. Denies pain or numbness or tingling at present. HOB up 25 degrees.

## 2020-09-22 NOTE — ANESTHESIA PRE PROCEDURE
[facility] Department of Anesthesiology  Pre-Anesthesia Evaluation/Consultation       Name:  Jaida Evangelista  : 1960  Age:  62 y.o. MRN:  [id number]  Date: 2020           Surgeon: Surgeon(s):  Redd Dominguez MD    Procedure: Procedure(s):  LEFT ULNAR NERVE DECOMPRESSION (AT ELBOW) AND LEFT CARPAL TUNNEL RELEASE     No Known Allergies  Patient Active Problem List   Diagnosis    Chest pain    Abnormal stress test    TISHA positive    DDD (degenerative disc disease), cervical    DDD (degenerative disc disease), lumbar    Depression    Helicobacter pylori infection    Hand arthritis    Hypertension    Hypertriglyceridemia    Left arm numbness    Left arm weakness    Left low back pain    Neck pain on left side    Right wrist pain    SS-A antibody positive    SS-B antibody positive    Postlaminectomy syndrome, lumbar    H/O: substance abuse (HCC)    Chronic pain syndrome    Synovitis of wrist    Cubital tunnel syndrome, bilateral    Bilateral carpal tunnel syndrome    Ulnar neuropathy of both upper extremities     Past Medical History:   Diagnosis Date    Arthropathy     TISHA positive    Back pain     Depression     Gout     Hyperlipidemia     Hypertension     Wears dentures      Past Surgical History:   Procedure Laterality Date    COLONOSCOPY      ENDOSCOPY, COLON, DIAGNOSTIC      LUMBAR FUSION  2017    PLIF L3-S1 done in [place], [place]     Social History     Tobacco Use    Smoking status: Never Smoker    Smokeless tobacco: Never Used   Substance Use Topics    Alcohol use: No    Drug use: No     Medications  No current facility-administered medications on file prior to encounter.       Current Outpatient Medications on File Prior to Encounter   Medication Sig Dispense Refill    amLODIPine (NORVASC) 2.5 MG tablet Take 1 tablet by mouth daily 90 tablet 1    gabapentin (NEURONTIN) 600 MG tablet Take 1 tablet by mouth 3 times daily for 29 days. 90 tablet 5    lisinopril (PRINIVIL;ZESTRIL) 20 MG tablet TAKE 1 TABLET BY MOUTH DAILY 90 tablet 0    allopurinol (ZYLOPRIM) 100 MG tablet Take 2 tablets by mouth daily And cancel script for one tab a day 60 tablet 5    zoster recombinant adjuvanted vaccine (SHINGRIX) 50 MCG/0.5ML SUSR injection Inject 0.5 mLs into the muscle      hydroxychloroquine (PLAQUENIL) 200 MG tablet Take 200 mg by mouth 2 times daily       Current Facility-Administered Medications   Medication Dose Route Frequency Provider Last Rate Last Dose    0.9 % sodium chloride infusion   Intravenous Continuous Sharon Molina MD        sodium chloride flush 0.9 % injection 10 mL  10 mL Intravenous 2 times per day Sharon Molina MD        sodium chloride flush 0.9 % injection 10 mL  10 mL Intravenous PRN Sharon Molina MD         Vital Signs (Current)   Vitals:    09/15/20 1328 09/22/20 0738   Weight: 210 lb (95.3 kg) 209 lb 7 oz (95 kg)   Height: 6' 5\" (1.956 m)                                           BP Readings from Last 3 Encounters:   09/16/20 131/86   08/12/20 128/88   07/02/20 (!) 159/100     Vital Signs Statistics (for past 48 hrs)     No data recorded  BP Readings from Last 3 Encounters:   09/16/20 131/86   08/12/20 128/88   07/02/20 (!) 159/100       BMI  Body mass index is 24.84 kg/m². Estimated body mass index is 24.84 kg/m² as calculated from the following:    Height as of 9/16/20: 6' 5\" (1.956 m). Weight as of this encounter: 209 lb 7 oz (95 kg).     CBC   Lab Results   Component Value Date    WBC 9.0 08/14/2020    RBC 5.27 08/14/2020    HGB 14.5 08/14/2020    HCT 44.2 08/14/2020    MCV 83.9 08/14/2020    RDW 14.0 08/14/2020     08/14/2020     CMP    Lab Results   Component Value Date     08/14/2020    K 4.7 08/14/2020     08/14/2020    CO2 23 08/14/2020    BUN 12 08/14/2020    CREATININE 0.9 08/14/2020    GFRAA >60 08/14/2020    AGRATIO 1.3 08/14/2020    LABGLOM >60 08/14/2020    GLUCOSE 112 08/14/2020    PROT 8.0 08/14/2020    CALCIUM 9.8 08/14/2020    BILITOT <0.2 08/14/2020    ALKPHOS 71 08/14/2020    AST 15 08/14/2020    ALT 15 08/14/2020     BMP    Lab Results   Component Value Date     08/14/2020    K 4.7 08/14/2020     08/14/2020    CO2 23 08/14/2020    BUN 12 08/14/2020    CREATININE 0.9 08/14/2020    CALCIUM 9.8 08/14/2020    GFRAA >60 08/14/2020    LABGLOM >60 08/14/2020    GLUCOSE 112 08/14/2020     POCGlucose  No results for input(s): GLUCOSE in the last 72 hours. Coags    Lab Results   Component Value Date    PROTIME 10.9 09/16/2015    INR 1.01 09/16/2015    APTT 35.7 28/30/8781     HCG (If Applicable) No results found for: PREGTESTUR, PREGSERUM, HCG, HCGQUANT   ABGs No results found for: PHART, PO2ART, RZH5JMB, STM7WYF, BEART, C7IRPDMK   Type & Screen (If Applicable)  No results found for: LABABO, LABRH                         BMI: Wt Readings from Last 3 Encounters:       NPO Status:  >8h                          Anesthesia Evaluation  Patient summary reviewed no history of anesthetic complications:   Airway: Mallampati: II  TM distance: >3 FB   Neck ROM: full  Mouth opening: > = 3 FB Dental: normal exam         Pulmonary: breath sounds clear to auscultation      (-) COPD, asthma, sleep apnea and not a current smoker                           Cardiovascular:  Exercise tolerance: good (>4 METS),   (+) hypertension:,     (-) past MI and CABG/stent      Rhythm: regular  Rate: normal                    Neuro/Psych:   (+) psychiatric history:            GI/Hepatic/Renal:        (-) GERD, hepatitis, liver disease and no renal disease       Endo/Other:        (-) diabetes mellitus, hypothyroidism               Abdominal:           Vascular:                                        Anesthesia Plan      general     ASA 2       Induction: intravenous. Anesthetic plan and risks discussed with patient. Plan discussed with CRNA.                 This pre-anesthesia assessment may be used as a history and physical.    DOS STAFF ADDENDUM:    Pt seen and examined, chart reviewed (including anesthesia, drug and allergy history). No interval changes to history and physical examination. Anesthetic plan, risks, benefits, alternatives, and personnel involved discussed with patient. Patient verbalized an understanding and agrees to proceed.       Haider Birmingham MD  September 22, 2020  7:43 AM

## 2020-09-22 NOTE — OP NOTE
OPERATIVE REPORT          Patient:  Azalea Eddy    YOB: 1960  Date of Service:  9/22/2020   Location:  [facility]      Preoperative Diagnoses:  Left  carpal tunnel syndrome & Left cubital tunnel syndrome     Postoperative Diagnoses:  Same    Procedures:   Left  Carpal Tunnel Release & Left Ulnar Nerve decompression at the Cubital Tunnel     Surgeon:    Pepe Nava. Erika Geiger MD    Surgical Assistant:    ERMA Kilgore Assistant    Anesthesia:   General                                   Blood Loss:    Minimal         Complications:    None                          Tourniquet Time:   6 minutes      Indications: Mr. Azalea Eddy is a 62y.o.  year old male with Left  carpal tunnel syndrome & Left cubital tunnel syndrome . I have discussed preoperatively with him  the complications, limitations, expectations, alternatives and risk of the planned surgical care which he understood & all of his  questions were answered. Mr. Azalea Eddy has provided written informed consent to proceed. After written consent was obtained and the proper operative sites were identified and marked, Mr. Azalea Eddy was brought to the operating room and placed in the supine position on the operating room table with the Left arm extended upon a hand table. General anesthesia was induced & the Left upper extremity was prepped and draped in the usual sterile fashion. Procedure:   After Esmarch exsanguination, the pneuomo-tourniquet was inflated to 250 millimeters of Mercury about the arm. Attention was turned to the medial elbow. A curvilinear incision was fashioned over the posterior medial aspect of the elbow centered between the medial epicondyle and the tip of the olecranon. Dissection was carried carefully through the subcutaneous tissue identifying and protecting the superficial neurovascular structures. The cubital tunnel was identified and cleared of overlying soft tissue.  Careful sutures. The wounds were dressed with Adaptic & dry sterile dressings after local anesthetic was instilled for postoperative analgesia. Mr. Jaida Evangelista was awakened from anesthesia having tolerated the procedure without apparent complication. He was returned to the recovery room in stable condition. At the conclusion of the procedure, all needle, instrument and sponge counts were correct. Juan Carlos Jung MD   9/22/2020, 8:39 AM

## 2020-09-22 NOTE — ANESTHESIA POSTPROCEDURE EVALUATION
Department of Anesthesiology  Postprocedure Note    Patient: Jerel Cummings  MRN: [id number]  YOB: 1960  Date of evaluation: 9/22/2020  Time:  12:32 PM     Procedure Summary     Date:  09/22/20 Room / Location:  [address] [place] [address]    Anesthesia Start:  0813 Anesthesia Stop:  0840    Procedure:  LEFT ULNAR NERVE DECOMPRESSION (AT ELBOW) AND LEFT CARPAL TUNNEL RELEASE (Left ) Diagnosis:       Left carpal tunnel syndrome      (LEFT CUBITAL TUNNEL SYNDROME)    Surgeon:  Ashley Lucas MD Responsible Provider:  Juan Armendariz MD    Anesthesia Type:  MAC ASA Status:  2          Anesthesia Type: MAC    Terra Phase I: Terra Score: 9    Terra Phase II: Terra Score: 10    Last vitals: Reviewed and per EMR flowsheets.        Anesthesia Post Evaluation    Patient location during evaluation: PACU  Patient participation: complete - patient participated  Level of consciousness: awake and alert  Pain score: 0  Airway patency: patent  Nausea & Vomiting: no nausea and no vomiting  Complications: no  Cardiovascular status: blood pressure returned to baseline  Respiratory status: acceptable  Hydration status: euvolemic

## 2020-09-28 ENCOUNTER — OFFICE VISIT (OUTPATIENT)
Dept: ORTHOPEDIC SURGERY | Age: 60
End: 2020-09-28

## 2020-09-28 VITALS — TEMPERATURE: 98.1 F | BODY MASS INDEX: 24.68 KG/M2 | RESPIRATION RATE: 16 BRPM | WEIGHT: 209 LBS | HEIGHT: 77 IN

## 2020-09-28 PROCEDURE — 99024 POSTOP FOLLOW-UP VISIT: CPT | Performed by: PHYSICIAN ASSISTANT

## 2020-09-28 NOTE — PATIENT INSTRUCTIONS
Postoperative Instructions After Carpal Tunnel Release and Ulnar Nerve Decompression    Dr. Surya Hughes. Teja        1. After bandages are removed one week from surgery, you may chose to wear a small bandage over the incision if you wish, though you do not need to. 2. Keep incision dry until sutures have fully dissolved  or it has been 14 days since your surgery. Thereafter, you may wash with mild soap and water and shower normally. 3. Once your stiches have fully disappeared & skin appears normal, you should begin gently massaging the incision with Vitamin E (may use Vitamin E lotion or contents of Vitamin E capsule). 4. Work hard on motion of the fingers and wrist, straightening each finger fully and bending each finger fully, bending wrist forward and bending wrist backwards. Do not be concerned if you experience discomfort. This will not damage the surgery. 5. You may begin using the hand as it feels comfortable beginning 12-14 days from the day of surgery. You may not feel entirely comfortable gripping or lifting heavy objects for several weeks. 6. You may expect to see some skin peel off around the incision. You may be left with a small area of pink baby skin. This is quite normal.    Thank you for choosing [facility] Physicians for your Hand and Upper Extremity needs. If we can be of any further assistance to you, please do not hesitate to contact us.     Office Phone Number:  [phone]  or  [phone]

## 2020-09-28 NOTE — PROGRESS NOTES
Mr. Sen John returns today in follow-up of his recent left Ulnar Nerve Decompression (Cubital Tunnel Release) & Carpal Tunnel Release done approximately 1 week ago. He has done well noting moderate discomfort and no other reported complications. He notes pre-operative symptoms to be completely resolved at this time. Physical Exam:  Skin incision is healing well, without erythema, drainage or sign of infection, nontender at incision site but tender in his left thumb and palm. Digital range of motion is Full and equal bilaterally  Wrist range of motion is Full and equal bilaterally. Elbow range of motion is without significant limitation. Sensation is completely resolved in the Median Innervated Digits and Ulnar Innervated Digits. Vascular examination reveals normal, good capillary refill, good color and warm. Swelling is minimal.  Sensory and Motor Median & Ulnar Nerve function is intact. Impression:  Mr. Sen John is doing well after recent left Ulnar Nerve Decompression (Cubital Tunnel Release) &  Carpal Tunnel Release. Plan:  Mr. Sen John is instructed in work on Active & Passive range of motion of the digits, wrist, & elbow. These modalities were specifically demonstrated to him today  and he return demonstrated proper understanding. We discussed the appropriateness of gradual resumption of use of the operated hand and the return to normal use as comfort allows. He is given instructions regarding management of the fresh surgical incision and progressive use of desensitization and tissue massage techniques. We discussed the appropriate expectations and timeline for symptom improvement. He is provided a written patient instruction sheet titled: Postoperative Instructions After Ulnar Nerve Decompression. I have asked Mr. Sen John to follow-up with me or contact me by telephone over the next 4 weeks if his symptoms have not fully resolved or if he has not regained full & painless return of function. He is also specifically instructed to return to the office or call for an appointment sooner if his symptoms are changing or worsening prior to that time.

## 2020-09-28 NOTE — Clinical Note
Dear  Jhon Webster MD,    Thank you very much for your referral or . Stephanie Infante to me for evaluation and treatment of his Hand & Wrist condition. I appreciate your confidence in me and thank you for allowing me the opportunity to care for your patients. If I can be of any further assistance to you on this or any other patient, please do not hesitate to contact me. Sincerely,    Roni Pruett.  Man Kaur MD

## 2020-09-30 ENCOUNTER — TELEPHONE (OUTPATIENT)
Dept: ORTHOPEDIC SURGERY | Age: 60
End: 2020-09-30

## 2020-10-07 ENCOUNTER — OFFICE VISIT (OUTPATIENT)
Dept: PRIMARY CARE CLINIC | Age: 60
End: 2020-10-07
Payer: MEDICARE

## 2020-10-07 PROCEDURE — 99211 OFF/OP EST MAY X REQ PHY/QHP: CPT | Performed by: NURSE PRACTITIONER

## 2020-10-07 NOTE — PROGRESS NOTES
Patient presented to [facility] drive up clinic for preop testing. Patient was swabbed and given information advising them to remain isolated until procedure date.

## 2020-10-07 NOTE — PROGRESS NOTES
[address] [facility] time______0735______        Surgery time____________    Take the following medications with a sip of water: Follow your Doctor's pre procedure instructions regarding medications    Do not eat or drink anything after 12:00 midnight prior to your surgery. This includes water chewing gum, mints and ice chips. You may brush your teeth and gargle the morning of your surgery, but do not swallow the water     Please see your family doctor/pediatrician for a history and physical and/or concerning medications. Bring any test results/reports from your physicians office. If you are under the care of a heart doctor or specialist doctor, please be aware that you may be asked to them for clearance    You may be asked to stop blood thinners such as Coumadin, Plavix, Fragmin, Lovenox, etc., or any anti-inflammatories such as:  Aspirin, Ibuprofen, Advil, Naproxen prior to your surgery. We also ask that you stop any OTC medications such as fish oil, vitamin E, glucosamine, garlic, Multivitamins, COQ 10, etc.    We ask that you do not smoke 24 hours prior to surgery  We ask that you do not  drink any alcoholic beverages 24 hours prior to surgery     You must make arrangements for a responsible adult to take you home after your surgery. For your safety you will not be allowed to leave alone or drive yourself home. Your surgery will be cancelled if you do not have a ride home. Also for your safety, it is strongly suggested that someone stay with you the first 24 hours after your surgery. A parent or legal guardian must accompany a child scheduled for surgery and plan to stay at the hospital until the child is discharged. Please do not bring other children with you. For your comfort, please wear simple loose fitting clothing to the hospital.  Please do not bring valuables.     Do not wear any make-up or nail polish on your fingers or toes      For your safety, please do not wear any jewelry or body piercing's on the day of surgery. All jewelry must be removed. If you have dentures, they will be removed before going to operating room. For your convenience, we will provide you with a container. If you wear contact lenses or glasses, they will be removed, please bring a case for them. If you have a living will and a durable power of  for healthcare, please bring in a copy. As part of our patient safety program to minimize surgical site infections, we ask you to do the following:    · Please notify your surgeon if you develop any illness between         now and the  day of your surgery. · This includes a cough, cold, fever, sore throat, nausea,         or vomiting, and diarrhea, etc.  ·  Please notify your surgeon if you experience dizziness, shortness         of breath or blurred vision between now and the time of your surgery. Do not shave your operative site 96 hours prior to surgery. For face and neck surgery, men may use an electric razor 48 hours   prior to surgery. You may shower the night before surgery or the morning of   your surgery with an antibacterial soap. You will need to bring a photo ID and insurance card    [facility] has an onsite pharmacy, would you like to utilize our pharmacy     If you will be staying overnight and use a C-pap machine, please bring   your C-pap to hospital     Our goal is to provide you with excellent care, therefore, visitors will be limited to two(2) in the room at a time so that we may focus on providing this care for you. Please contact pre-admission testing if you have any further questions. [facility] phone number:  [phone] PAT fax number:  [phone]  Please note these are generalized instructions for all surgical cases, you may be provided with more specific instructions according to your surgery.   Preoperative Screening for Elective Surgery/Invasive Procedures While COVID-19 present in the community     Have you tested positive or have been told to self-isolate for COVID-19 like symptoms within the past 28 days? no   Do you currently have any of the following symptoms?no  o Fever >100.0 F or 99.9 F in immunocompromised patients?no  o New onset cough, shortness of breath or difficulty breathing?no  o New onset sore throat, myalgia (muscle aches and pains), headache, loss of taste/smell or diarrhea? no   Have you had a potential exposure to COVID-19 within the past 14 days by:  o Close contact with a confirmed case?no  o Close contact with a healthcare worker,  or essential infrastructure worker (grocery store, [employer], gas station, public utilities or transportation)?no  o Do you reside in a congregate setting such as; skilled nursing facility, adult home, correctional facility, homeless shelter or other institutional setting?no  o Have you had recent travel to a known COVID-19 hotspot? no    Indicate if the patient has a positive screen by answering yes to one or more of the above questions. Patients who test positive or screen positive prior to surgery or on the day of surgery should be evaluated in conjunction with the surgeon/proceduralist/anesthesiologist to determine the urgency of the procedure.

## 2020-10-08 LAB — SARS-COV-2, NAA: NOT DETECTED

## 2020-10-09 NOTE — RESULT ENCOUNTER NOTE
Your Covid-10 teste resulted not detected/negative. What happens if I have a negative test?    Remember to wash your hands often, avoid touching your face, stay 6 feet from people you do not live with, and wear a cloth facemask when you go out in public. A negative COVID-19 test at one point in time does not mean you will stay negative. You could become ill with COVID-19 and/or test positive at any time. If you are a close contact of a confirmed or suspected case, continue to stay home and away from others until 14 days after your last exposure. If you do not have symptoms, and were not in close contact with a confirmed or suspected case, you can stop isolating. If you currently have symptoms of COVID-19, and were not in close contact with a confirmed or suspected case, you should keep monitoring symptoms and talk to your doctor or other healthcare provider about staying home and if you need to get tested again. If you develop symptoms of COVID-19, stay at home and away from others and talk to your doctor or other healthcare provider about getting tested again. For additional information, visit coronavirus. [place].gov. For answers to your COVID-19 questions, call 3-248-8-ASK-[facility] ([phone]).

## 2020-10-12 ENCOUNTER — ANESTHESIA EVENT (OUTPATIENT)
Dept: OPERATING ROOM | Age: 60
End: 2020-10-12
Payer: MEDICARE

## 2020-10-13 ENCOUNTER — HOSPITAL ENCOUNTER (OUTPATIENT)
Age: 60
Setting detail: OUTPATIENT SURGERY
Discharge: HOME | End: 2020-10-13
Attending: ORTHOPAEDIC SURGERY | Admitting: ORTHOPAEDIC SURGERY
Payer: MEDICARE

## 2020-10-13 ENCOUNTER — ANESTHESIA (OUTPATIENT)
Dept: OPERATING ROOM | Age: 60
End: 2020-10-13
Payer: MEDICARE

## 2020-10-13 VITALS
DIASTOLIC BLOOD PRESSURE: 67 MMHG | WEIGHT: 212.74 LBS | HEIGHT: 77 IN | RESPIRATION RATE: 16 BRPM | HEART RATE: 76 BPM | SYSTOLIC BLOOD PRESSURE: 106 MMHG | OXYGEN SATURATION: 99 % | TEMPERATURE: 97.9 F | BODY MASS INDEX: 25.12 KG/M2

## 2020-10-13 VITALS
RESPIRATION RATE: 15 BRPM | SYSTOLIC BLOOD PRESSURE: 117 MMHG | TEMPERATURE: 96.8 F | OXYGEN SATURATION: 100 % | DIASTOLIC BLOOD PRESSURE: 61 MMHG

## 2020-10-13 PROCEDURE — 6360000002 HC RX W HCPCS

## 2020-10-13 PROCEDURE — 3600000005 HC SURGERY LEVEL 5 BASE: Performed by: ORTHOPAEDIC SURGERY

## 2020-10-13 PROCEDURE — 3600000015 HC SURGERY LEVEL 5 ADDTL 15MIN: Performed by: ORTHOPAEDIC SURGERY

## 2020-10-13 PROCEDURE — 3700000001 HC ADD 15 MINUTES (ANESTHESIA): Performed by: ORTHOPAEDIC SURGERY

## 2020-10-13 PROCEDURE — 3700000000 HC ANESTHESIA ATTENDED CARE: Performed by: ORTHOPAEDIC SURGERY

## 2020-10-13 PROCEDURE — 7100000011 HC PHASE II RECOVERY - ADDTL 15 MIN: Performed by: ORTHOPAEDIC SURGERY

## 2020-10-13 PROCEDURE — 2580000003 HC RX 258: Performed by: ANESTHESIOLOGY

## 2020-10-13 PROCEDURE — 2500000003 HC RX 250 WO HCPCS: Performed by: ORTHOPAEDIC SURGERY

## 2020-10-13 PROCEDURE — 2709999900 HC NON-CHARGEABLE SUPPLY: Performed by: ORTHOPAEDIC SURGERY

## 2020-10-13 PROCEDURE — 7100000001 HC PACU RECOVERY - ADDTL 15 MIN: Performed by: ORTHOPAEDIC SURGERY

## 2020-10-13 PROCEDURE — 2500000003 HC RX 250 WO HCPCS

## 2020-10-13 PROCEDURE — 7100000000 HC PACU RECOVERY - FIRST 15 MIN: Performed by: ORTHOPAEDIC SURGERY

## 2020-10-13 PROCEDURE — 7100000010 HC PHASE II RECOVERY - FIRST 15 MIN: Performed by: ORTHOPAEDIC SURGERY

## 2020-10-13 PROCEDURE — 6360000002 HC RX W HCPCS: Performed by: ANESTHESIOLOGY

## 2020-10-13 RX ORDER — FENTANYL CITRATE 50 UG/ML
25 INJECTION, SOLUTION INTRAMUSCULAR; INTRAVENOUS EVERY 5 MIN PRN
Status: DISCONTINUED | OUTPATIENT
Start: 2020-10-13 | End: 2020-10-13 | Stop reason: HOSPADM

## 2020-10-13 RX ORDER — FENTANYL CITRATE 50 UG/ML
INJECTION, SOLUTION INTRAMUSCULAR; INTRAVENOUS PRN
Status: DISCONTINUED | OUTPATIENT
Start: 2020-10-13 | End: 2020-10-13 | Stop reason: SDUPTHER

## 2020-10-13 RX ORDER — SODIUM CHLORIDE 0.9 % (FLUSH) 0.9 %
10 SYRINGE (ML) INJECTION PRN
Status: DISCONTINUED | OUTPATIENT
Start: 2020-10-13 | End: 2020-10-13 | Stop reason: HOSPADM

## 2020-10-13 RX ORDER — DEXAMETHASONE SODIUM PHOSPHATE 4 MG/ML
INJECTION, SOLUTION INTRA-ARTICULAR; INTRALESIONAL; INTRAMUSCULAR; INTRAVENOUS; SOFT TISSUE PRN
Status: DISCONTINUED | OUTPATIENT
Start: 2020-10-13 | End: 2020-10-13 | Stop reason: SDUPTHER

## 2020-10-13 RX ORDER — PROPOFOL 10 MG/ML
INJECTION, EMULSION INTRAVENOUS PRN
Status: DISCONTINUED | OUTPATIENT
Start: 2020-10-13 | End: 2020-10-13 | Stop reason: SDUPTHER

## 2020-10-13 RX ORDER — BUPIVACAINE HYDROCHLORIDE 5 MG/ML
INJECTION, SOLUTION EPIDURAL; INTRACAUDAL
Status: COMPLETED | OUTPATIENT
Start: 2020-10-13 | End: 2020-10-13

## 2020-10-13 RX ORDER — OXYCODONE HYDROCHLORIDE AND ACETAMINOPHEN 5; 325 MG/1; MG/1
1 TABLET ORAL PRN
Status: DISCONTINUED | OUTPATIENT
Start: 2020-10-13 | End: 2020-10-13 | Stop reason: HOSPADM

## 2020-10-13 RX ORDER — SODIUM CHLORIDE 0.9 % (FLUSH) 0.9 %
10 SYRINGE (ML) INJECTION EVERY 12 HOURS SCHEDULED
Status: DISCONTINUED | OUTPATIENT
Start: 2020-10-13 | End: 2020-10-13 | Stop reason: HOSPADM

## 2020-10-13 RX ORDER — ONDANSETRON 2 MG/ML
4 INJECTION INTRAMUSCULAR; INTRAVENOUS
Status: COMPLETED | OUTPATIENT
Start: 2020-10-13 | End: 2020-10-13

## 2020-10-13 RX ORDER — SODIUM CHLORIDE 9 MG/ML
INJECTION, SOLUTION INTRAVENOUS CONTINUOUS
Status: DISCONTINUED | OUTPATIENT
Start: 2020-10-13 | End: 2020-10-13 | Stop reason: HOSPADM

## 2020-10-13 RX ORDER — LIDOCAINE HYDROCHLORIDE 20 MG/ML
INJECTION, SOLUTION EPIDURAL; INFILTRATION; INTRACAUDAL; PERINEURAL PRN
Status: DISCONTINUED | OUTPATIENT
Start: 2020-10-13 | End: 2020-10-13 | Stop reason: SDUPTHER

## 2020-10-13 RX ORDER — MIDAZOLAM HYDROCHLORIDE 1 MG/ML
INJECTION INTRAMUSCULAR; INTRAVENOUS PRN
Status: DISCONTINUED | OUTPATIENT
Start: 2020-10-13 | End: 2020-10-13 | Stop reason: SDUPTHER

## 2020-10-13 RX ORDER — OXYCODONE HYDROCHLORIDE AND ACETAMINOPHEN 5; 325 MG/1; MG/1
2 TABLET ORAL PRN
Status: DISCONTINUED | OUTPATIENT
Start: 2020-10-13 | End: 2020-10-13 | Stop reason: HOSPADM

## 2020-10-13 RX ADMIN — PROPOFOL 250 MG: 10 INJECTION, EMULSION INTRAVENOUS at 09:01

## 2020-10-13 RX ADMIN — DEXAMETHASONE SODIUM PHOSPHATE 8 MG: 4 INJECTION, SOLUTION INTRAMUSCULAR; INTRAVENOUS at 09:10

## 2020-10-13 RX ADMIN — FENTANYL CITRATE 25 MCG: 50 INJECTION INTRAMUSCULAR; INTRAVENOUS at 09:19

## 2020-10-13 RX ADMIN — ONDANSETRON 4 MG: 2 INJECTION INTRAMUSCULAR; INTRAVENOUS at 09:10

## 2020-10-13 RX ADMIN — LIDOCAINE HYDROCHLORIDE 50 MG: 20 INJECTION, SOLUTION EPIDURAL; INFILTRATION; INTRACAUDAL; PERINEURAL at 09:01

## 2020-10-13 RX ADMIN — MIDAZOLAM 2 MG: 1 INJECTION INTRAMUSCULAR; INTRAVENOUS at 09:01

## 2020-10-13 RX ADMIN — SODIUM CHLORIDE: 9 INJECTION, SOLUTION INTRAVENOUS at 08:03

## 2020-10-13 ASSESSMENT — PULMONARY FUNCTION TESTS
PIF_VALUE: 3
PIF_VALUE: 3
PIF_VALUE: 1
PIF_VALUE: 1
PIF_VALUE: 11
PIF_VALUE: 3
PIF_VALUE: 3
PIF_VALUE: 12
PIF_VALUE: 3
PIF_VALUE: 0
PIF_VALUE: 12
PIF_VALUE: 1
PIF_VALUE: 3
PIF_VALUE: 15
PIF_VALUE: 11
PIF_VALUE: 0
PIF_VALUE: 3
PIF_VALUE: 4
PIF_VALUE: 3
PIF_VALUE: 11
PIF_VALUE: 12
PIF_VALUE: 8
PIF_VALUE: 3
PIF_VALUE: 0
PIF_VALUE: 10
PIF_VALUE: 8
PIF_VALUE: 2
PIF_VALUE: 3

## 2020-10-13 ASSESSMENT — PAIN SCALES - GENERAL
PAINLEVEL_OUTOF10: 0

## 2020-10-13 ASSESSMENT — ENCOUNTER SYMPTOMS: SHORTNESS OF BREATH: 0

## 2020-10-13 ASSESSMENT — LIFESTYLE VARIABLES: SMOKING_STATUS: 0

## 2020-10-13 ASSESSMENT — PAIN - FUNCTIONAL ASSESSMENT: PAIN_FUNCTIONAL_ASSESSMENT: 0-10

## 2020-10-13 NOTE — DISCHARGE INSTR - ACTIVITY
a face cover. § Don't share personal items. These include dishes, cups, towels, and bedding. ? Wash your hands often and well. Use soap and water, and scrub for at least 20 seconds. This is especially important after you've been around the sick person or touched things they've touched. If soap and water aren't handy, use a hand  with at least 60% alcohol. ? Avoid touching your mouth, nose, and eyes. ? Take care with the person's laundry. It's okay to wash the sick person's laundry with yours. If you have them, wear disposable gloves when handling their dirty laundry, and wash your hands well after you touch it. Wash items in the warmest water allowed for the fabric type, and dry them completely. ? Clean high-touch items every day and anytime the sick person touches them. These include doorknobs, light switches, toilets, counters, and remote controls. Use a household disinfectant or a homemade bleach solution. (Follow the directions on the label.) If the sick person has their own room, have them disinfect it every day. ? Limit visitors to your home. To help protect family and friends, stay in touch with them only by phone or computer. Current as of: July 10, 2020               Content Version: 12.6  © 0241-6984 [employer]. Care instructions adapted under license by [facility] ([facility]). If you have questions about a medical condition or this instruction, always ask your healthcare professional. [place] [address] any warranty or liability for your use of this information.

## 2020-10-13 NOTE — OP NOTE
OPERATIVE REPORT          Patient:  Gerson Cooley    YOB: 1960  Date of Service:  10/13/2020   Location:  [facility]      Preoperative Diagnoses:  Right  carpal tunnel syndrome & Right cubital tunnel syndrome     Postoperative Diagnoses:  Same    Procedures:   Right  Carpal Tunnel Release & Right Ulnar Nerve decompression at the Cubital Tunnel     Surgeon:    Bryant Ledesma MD    Surgical Assistant:    ERMA Kilgore Assistant    Anesthesia:   General                                   Blood Loss:    Minimal         Complications:    None                          Tourniquet Time:   6 minutes      Indications: Mr. Gerson Cooley is a 62y.o.  year old male with Right  carpal tunnel syndrome & Right cubital tunnel syndrome . I have discussed preoperatively with him  the complications, limitations, expectations, alternatives and risk of the planned surgical care which he understood & all of his  questions were answered. Mr. Gerson Cooley has provided written informed consent to proceed. After written consent was obtained and the proper operative sites were identified and marked, Mr. Gerson Cooley was brought to the operating room and placed in the supine position on the operating room table with the Right arm extended upon a hand table. General anesthesia was induced & the Right upper extremity was prepped and draped in the usual sterile fashion. Procedure:   After Esmarch exsanguination, the pneuomo-tourniquet was inflated to 250 millimeters of Mercury about the arm. Attention was turned to the medial elbow. A curvilinear incision was fashioned over the posterior medial aspect of the elbow centered between the medial epicondyle and the tip of the olecranon. Dissection was carried carefully through the subcutaneous tissue identifying and protecting the superficial neurovascular structures. The cubital tunnel was identified and cleared of overlying soft tissue. interrupted sutures. The wounds were dressed with Adaptic & dry sterile dressings after local anesthetic was instilled for postoperative analgesia. Mr. Jameel Pandey was awakened from anesthesia having tolerated the procedure without apparent complication. He was returned to the recovery room in stable condition. At the conclusion of the procedure, all needle, instrument and sponge counts were correct. Stacey Ochoa MD   10/13/2020, 9:27 AM

## 2020-10-13 NOTE — PROGRESS NOTES
To pacu from OR. Pt asleep with oral airway in place. Dressing to right arm dry and intact. Fingers to right hand warm with brisk cap refill. IV infusing. Monitor in sinus rhythm.

## 2020-10-13 NOTE — ANESTHESIA POSTPROCEDURE EVALUATION
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        Anesthesia Post Evaluation    Patient location during evaluation: PACU  Patient participation: complete - patient participated  Level of consciousness: awake and alert  Airway patency: patent  Nausea & Vomiting: no nausea and no vomiting  Complications: no  Cardiovascular status: hemodynamically stable  Respiratory status: acceptable  Hydration status: stable

## 2020-10-13 NOTE — PROGRESS NOTES
Pt awake but wants to rest on stretcher for a little bit. Denies pain at present. Bends and straightens right fingers. Elevated right arm. Given soda and cookies and call light. Mother in law present in room.

## 2020-10-13 NOTE — ANESTHESIA PRE PROCEDURE
Department of Anesthesiology  Preprocedure Note       Name:  Ros Yu   Age:  62 y.o.  :  1960                                          MRN:  [id number]         Date:  10/13/2020      Surgeon: Kelsey Huang):  Joana Dandy, MD    Procedure: Procedure(s):  RIGHT ULNAR NERVE DECOMPRESSION AT ELBOW AND RIGHT CARPAL TUNNEL RELEASE    Medications prior to admission:   Prior to Admission medications    Medication Sig Start Date End Date Taking? Authorizing Provider   amLODIPine (NORVASC) 2.5 MG tablet Take 1 tablet by mouth daily 20  Yes Mel Torres MD   allopurinol (ZYLOPRIM) 100 MG tablet Take 2 tablets by mouth daily And cancel script for one tab a day 20  Yes Yumi Zamudio MD   hydroxychloroquine (PLAQUENIL) 200 MG tablet Take 200 mg by mouth 2 times daily 19  Yes Historical Provider, MD   gabapentin (NEURONTIN) 600 MG tablet Take 1 tablet by mouth 3 times daily for 29 days.  8/12/20 9/15/20  Mel Torres MD   lisinopril (PRINIVIL;ZESTRIL) 20 MG tablet TAKE 1 TABLET BY MOUTH DAILY 7/7/20 10/5/20  Yumi Zamudio MD       Current medications:    Current Facility-Administered Medications   Medication Dose Route Frequency Provider Last Rate Last Dose    0.9 % sodium chloride infusion   Intravenous Continuous Aiden Carrillo  mL/hr at 10/13/20 0803      sodium chloride flush 0.9 % injection 10 mL  10 mL Intravenous 2 times per day Aiden Carrillo MD        sodium chloride flush 0.9 % injection 10 mL  10 mL Intravenous PRN Aiden Carrillo MD           Allergies:  No Known Allergies    Problem List:    Patient Active Problem List   Diagnosis Code    Chest pain R07.9    Abnormal stress test R94.39    TISHA positive R76.8    DDD (degenerative disc disease), cervical M50.30    DDD (degenerative disc disease), lumbar M51.36    Depression F05.2    Helicobacter pylori infection A04.8    Hand arthritis M19.049    Hypertension I10    Hypertriglyceridemia E78.1    Left arm weakness R29.898    Left low back pain M54.5    Neck pain on left side M54.2    Right wrist pain M25.531    SS-A antibody positive R76.8    SS-B antibody positive R76.8    Postlaminectomy syndrome, lumbar M96.1    H/O: substance abuse (HCC) F19.11    Chronic pain syndrome G89.4    Synovitis of wrist M65.9    Cubital tunnel syndrome, bilateral G56.23    Bilateral carpal tunnel syndrome G56.03    Ulnar neuropathy of both upper extremities G56.23       Past Medical History:        Diagnosis Date    Arthropathy     TISHA positive    Back pain     Depression     Gout     Hyperlipidemia     Hypertension     Wears dentures        Past Surgical History:        Procedure Laterality Date    ARM SURGERY Left 9/22/2020    LEFT ULNAR NERVE DECOMPRESSION (AT ELBOW) AND LEFT CARPAL TUNNEL RELEASE performed by Mik Villafana MD at [address] [place] [address], DIAGNOSTIC      LUMBAR FUSION  2017    PLIF L3-S1 done in [place], [place]       Social History:    Social History     Tobacco Use    Smoking status: Never Smoker    Smokeless tobacco: Never Used   Substance Use Topics    Alcohol use:  No                                Counseling given: Not Answered      Vital Signs (Current):   Vitals:    10/13/20 0756   BP: (!) 143/93   Pulse: 83   Resp: 14   Temp: 98 °F (36.7 °C)   TempSrc: Temporal   SpO2: 99%   Weight: 212 lb 11.9 oz (96.5 kg)   Height: 6' 5\" (1.956 m)                                              BP Readings from Last 3 Encounters:   10/13/20 (!) 143/93   09/22/20 105/66   09/22/20 124/86       NPO Status: Time of last liquid consumption: 2100                        Time of last solid consumption: 2100                        Date of last liquid consumption: 10/12/20                        Date of last solid food consumption: 10/12/20    BMI:   Wt Readings from Last 3 Encounters:   10/13/20 212 lb 11.9 oz (96.5 kg)   09/28/20 209 lb (94.8 kg) 09/22/20 209 lb 7 oz (95 kg)     Body mass index is 25.23 kg/m². CBC:   Lab Results   Component Value Date    WBC 9.0 08/14/2020    RBC 5.27 08/14/2020    HGB 14.5 08/14/2020    HCT 44.2 08/14/2020    MCV 83.9 08/14/2020    RDW 14.0 08/14/2020     08/14/2020       CMP:   Lab Results   Component Value Date     08/14/2020    K 4.7 08/14/2020     08/14/2020    CO2 23 08/14/2020    BUN 12 08/14/2020    CREATININE 0.9 08/14/2020    GFRAA >60 08/14/2020    AGRATIO 1.3 08/14/2020    LABGLOM >60 08/14/2020    GLUCOSE 112 08/14/2020    PROT 8.0 08/14/2020    CALCIUM 9.8 08/14/2020    BILITOT <0.2 08/14/2020    ALKPHOS 71 08/14/2020    AST 15 08/14/2020    ALT 15 08/14/2020       POC Tests: No results for input(s): POCGLU, POCNA, POCK, POCCL, POCBUN, POCHEMO, POCHCT in the last 72 hours.     Coags:   Lab Results   Component Value Date    PROTIME 10.9 09/16/2015    INR 1.01 09/16/2015    APTT 35.7 09/16/2015       HCG (If Applicable): No results found for: PREGTESTUR, PREGSERUM, HCG, HCGQUANT     ABGs: No results found for: PHART, PO2ART, QRD0OLE, CAJ1XZD, BEART, E5CVOLCN     Type & Screen (If Applicable):  No results found for: LABABO, LABRH    Drug/Infectious Status (If Applicable):  No results found for: HIV, HEPCAB    COVID-19 Screening (If Applicable):   Lab Results   Component Value Date    COVID19 NOT DETECTED 10/07/2020         Anesthesia Evaluation  Patient summary reviewed no history of anesthetic complications:   Airway: Mallampati: II  TM distance: >3 FB   Neck ROM: full  Mouth opening: > = 3 FB Dental:    (+) edentulous      Pulmonary: breath sounds clear to auscultation      (-) COPD, asthma, shortness of breath, recent URI, sleep apnea and not a current smoker                           Cardiovascular:    (+) hypertension:, hyperlipidemia    (-) valvular problems/murmurs, past MI, CAD, CABG/stent, dysrhythmias,  angina,  CHF and murmur      Rhythm: regular  Rate: normal Neuro/Psych:   (+) neuromuscular disease:, psychiatric history:depression/anxiety    (-) seizures, TIA and CVA           GI/Hepatic/Renal:        (-) GERD, PUD, hepatitis, liver disease and no renal disease       Endo/Other:    (+) : arthritis:., .    (-) diabetes mellitus, hypothyroidism, hyperthyroidism               Abdominal:           Vascular:                                        Anesthesia Plan      general     ASA 2       Induction: intravenous. MIPS: Postoperative opioids intended and Prophylactic antiemetics administered. Anesthetic plan and risks discussed with patient. Plan discussed with CRNA. This pre-anesthesia assessment may be used as a history and physical.    DOS STAFF ADDENDUM:    Pt seen and examined, chart reviewed (including anesthesia, drug and allergy history). No interval changes to history and physical examination. Anesthetic plan, risks, benefits, alternatives, and personnel involved discussed with patient. Patient verbalized an understanding and agrees to proceed.       Treasure Hernandez MD  October 13, 2020  8:10 AM      Treasure Hernandez MD   10/13/2020

## 2020-10-19 ENCOUNTER — OFFICE VISIT (OUTPATIENT)
Dept: ORTHOPEDIC SURGERY | Age: 60
End: 2020-10-19

## 2020-10-19 VITALS — TEMPERATURE: 97.8 F | WEIGHT: 212 LBS | HEIGHT: 77 IN | BODY MASS INDEX: 25.03 KG/M2 | RESPIRATION RATE: 16 BRPM

## 2020-10-19 PROBLEM — G56.23 ULNAR NEUROPATHY OF BOTH UPPER EXTREMITIES: Status: RESOLVED | Noted: 2020-09-05 | Resolved: 2020-10-19

## 2020-10-19 PROBLEM — G56.03 BILATERAL CARPAL TUNNEL SYNDROME: Status: RESOLVED | Noted: 2020-09-05 | Resolved: 2020-10-19

## 2020-10-19 PROBLEM — G56.23 CUBITAL TUNNEL SYNDROME, BILATERAL: Status: RESOLVED | Noted: 2020-09-04 | Resolved: 2020-10-19

## 2020-10-19 PROCEDURE — 99024 POSTOP FOLLOW-UP VISIT: CPT | Performed by: PHYSICIAN ASSISTANT

## 2020-10-19 NOTE — Clinical Note
Dear  Kelly Mcintosh MD,    Thank you very much for your referral or Mr. Corrinne Leaks to me for evaluation and treatment of his Hand & Wrist condition. I appreciate your confidence in me and thank you for allowing me the opportunity to care for your patients. If I can be of any further assistance to you on this or any other patient, please do not hesitate to contact me. Sincerely,    Haider Zhang.  Finesse Plasencia MD

## 2020-10-19 NOTE — PROGRESS NOTES
Mr. Emiliano Amaya returns today in follow-up of his recent right Ulnar Nerve Decompression (Cubital Tunnel Release) & Carpal Tunnel Release done approximately 1 week ago. He has done well noting mild discomfort and no other reported complications. He notes pre-operative symptoms to be completely resolved at this time. Physical Exam:  Skin incision is healing well, without erythema, drainage or sign of infection, nontender. Digital range of motion is Full and equal bilaterally. Wrist range of motion is Full and equal bilaterally. Elbow range of motion is Full and equal bilaterally. Sensation is completely resolved in the Median Innervated Digits and Ulnar Innervated Digits. Vascular examination reveals normal, good capillary refill, good color and warm. Swelling is minimal.  Sensory and Motor Median & Ulnar Nerve function is intact. Impression:  Mr. Emiliano Amaya is doing well after recent right Ulnar Nerve Decompression (Cubital Tunnel Release) &  Carpal Tunnel Release. Plan:  Mr. Emiliano Amaya is instructed in work on Active & Passive range of motion of the digits, wrist, & elbow. These modalities were specifically demonstrated to him today and he return demonstrated proper understanding. We discussed the appropriateness of gradual resumption of use of the operated hand and the return to normal use as comfort allows. He is given instructions regarding management of the fresh surgical incision and progressive use of desensitization and tissue massage techniques. We discussed the appropriate expectations and timeline for symptom improvement. He is provided a written patient instruction sheet titled: Postoperative Instructions After Ulnar Nerve Decompression. I have asked Mr. Emiliano Amaya to follow-up with me or contact me by telephone over the next 4 weeks if his symptoms have not fully resolved or if he has not regained full & painless return of function.       He is also specifically instructed to return to the office or call for an appointment sooner if his symptoms are changing or worsening prior to that time.

## 2020-10-26 ENCOUNTER — TELEPHONE (OUTPATIENT)
Dept: ORTHOPEDIC SURGERY | Age: 60
End: 2020-10-26

## 2020-10-26 NOTE — TELEPHONE ENCOUNTER
OTHER  SX R HAND 10/13/20  HAS A LARGE HOLE @ THE SX SITE/ YELLOW PUS WAS COMING OUT THIS MORNING. VERY PAINFUL 9/10 / WOULD LIKE TO COME IN TODAY IF POSSIBLE. REQUESTING A CALL BACK.   PATIENT: Mikie  PHONE: [phone]

## 2020-10-27 RX ORDER — LISINOPRIL 20 MG/1
20 TABLET ORAL DAILY
Qty: 90 TABLET | Refills: 0 | Status: SHIPPED | OUTPATIENT
Start: 2020-10-27 | End: 2020-12-22

## 2020-10-30 ENCOUNTER — OFFICE VISIT (OUTPATIENT)
Dept: ORTHOPEDIC SURGERY | Age: 60
End: 2020-10-30

## 2020-10-30 VITALS — RESPIRATION RATE: 16 BRPM | TEMPERATURE: 98 F | WEIGHT: 212 LBS | BODY MASS INDEX: 25.03 KG/M2 | HEIGHT: 77 IN

## 2020-10-30 PROCEDURE — 99024 POSTOP FOLLOW-UP VISIT: CPT | Performed by: ORTHOPAEDIC SURGERY

## 2020-10-30 NOTE — PATIENT INSTRUCTIONS
Protocol for Managing Open Wounds  Dr. Nelson Lezama            1. Please remove all dressings so that you may see the skin fully. 2. Prepare a clean sink full of warm water (bath temperature). Add one to two squirts of liquid antibacterial soap. Insert hand and soak for approximately five minutes, making sure to exercise your motion of fingers and wrists fully while soaking in warm water. 3. After finished soaking, pat wound dry. 4. I typically recommend soaking two to three times per day with a clean dressing change after each soak.

## 2020-10-30 NOTE — PROGRESS NOTES
Mr. Luciana Rojas returns today in follow-up of his recent right Ulnar Nerve Decompression (Cubital Tunnel Release) & Carpal Tunnel Release done approximately 2 weeks ago. He has done fairly well noting moderate to severe discomfort (rates pain 9/10) from his right Carpal Tunnel Release incision site that dehisced on 10-. He stated that he had pus drainage noted at that time but since has ceased. He notes pre-operative symptoms to be completely resolved at this time. Physical Exam:  Skin incision is without erythema, drainage or sign of infection, mild dehiscence is noted involving FULL length of the incison, nontender. He has formed a scab on incision and healing properly. His cubital tunnel incision without erythema, scant amount of pus drainage noted with retained suture, no other signs of infection, non tender. Digital range of motion is Full and equal bilaterally. Wrist range of motion is Full and equal bilaterally. Elbow range of motion is Full and equal bilaterally. Sensation is completely resolved in the Median Innervated Digits and Ulnar Innervated Digits. Vascular examination reveals normal, good capillary refill, good color and warm. Swelling is minimal.  Sensory and Motor Median & Ulnar Nerve function is intact. Impression:  Mr. Luciana Rojas is doing fairly well after recent right Ulnar Nerve Decompression (Cubital Tunnel Release) &  Carpal Tunnel Release. He had mild dehiscence noted on Carpal Tunnel Release incision site but healing properly without any complications. Plan:    Mr. Luciana Rojas is instructed on the appropriateness of gradual resumption of use of the operated hand and the return to normal use as comfort allows. He was given instructions regarding antibacterial soaks and progressive use of desensitization and tissue massage techniques. We discussed the appropriate expectations and timeline for symptom improvement.     One retained suture removed at the proximal end of Right Cubital Tunnel incision site. No active drainage noted. He is provided a written patient instruction sheet titled: Protocol for Managing Open Wounds. I have asked Mr. Rohan Lujan to follow-up with me or contact me by telephone if his symptoms have not fully resolved or if he has not regained full & painless return of function over the next couple of weeks. He is also specifically instructed to return to the office or call for an appointment sooner if his symptoms are changing or worsening prior to that time.

## 2020-12-22 RX ORDER — LISINOPRIL 20 MG/1
20 TABLET ORAL DAILY
Qty: 90 TABLET | Refills: 0 | Status: SHIPPED | OUTPATIENT
Start: 2020-12-22 | End: 2021-06-18 | Stop reason: SDUPTHER

## 2021-06-18 DIAGNOSIS — I10 ESSENTIAL HYPERTENSION: ICD-10-CM

## 2021-06-18 RX ORDER — LISINOPRIL 20 MG/1
20 TABLET ORAL DAILY
Qty: 30 TABLET | Refills: 0 | Status: SHIPPED | OUTPATIENT
Start: 2021-06-18 | End: 2021-08-13

## 2021-09-02 ENCOUNTER — VIRTUAL VISIT (OUTPATIENT)
Dept: PRIMARY CARE CLINIC | Age: 61
End: 2021-09-02
Payer: MEDICARE

## 2021-09-02 DIAGNOSIS — Z00.00 ROUTINE GENERAL MEDICAL EXAMINATION AT A HEALTH CARE FACILITY: ICD-10-CM

## 2021-09-02 DIAGNOSIS — Z71.89 ACP (ADVANCE CARE PLANNING): ICD-10-CM

## 2021-09-02 PROCEDURE — G0438 PPPS, INITIAL VISIT: HCPCS | Performed by: NURSE PRACTITIONER

## 2021-09-02 SDOH — ECONOMIC STABILITY: FOOD INSECURITY: WITHIN THE PAST 12 MONTHS, YOU WORRIED THAT YOUR FOOD WOULD RUN OUT BEFORE YOU GOT MONEY TO BUY MORE.: NEVER TRUE

## 2021-09-02 SDOH — ECONOMIC STABILITY: FOOD INSECURITY: WITHIN THE PAST 12 MONTHS, THE FOOD YOU BOUGHT JUST DIDN'T LAST AND YOU DIDN'T HAVE MONEY TO GET MORE.: NEVER TRUE

## 2021-09-02 ASSESSMENT — PATIENT HEALTH QUESTIONNAIRE - PHQ9
SUM OF ALL RESPONSES TO PHQ QUESTIONS 1-9: 1
SUM OF ALL RESPONSES TO PHQ QUESTIONS 1-9: 1
1. LITTLE INTEREST OR PLEASURE IN DOING THINGS: 0
SUM OF ALL RESPONSES TO PHQ9 QUESTIONS 1 & 2: 1
SUM OF ALL RESPONSES TO PHQ QUESTIONS 1-9: 1
2. FEELING DOWN, DEPRESSED OR HOPELESS: 1

## 2021-09-02 ASSESSMENT — LIFESTYLE VARIABLES: HOW OFTEN DO YOU HAVE A DRINK CONTAINING ALCOHOL: 0

## 2021-09-02 ASSESSMENT — SOCIAL DETERMINANTS OF HEALTH (SDOH): HOW HARD IS IT FOR YOU TO PAY FOR THE VERY BASICS LIKE FOOD, HOUSING, MEDICAL CARE, AND HEATING?: NOT HARD AT ALL

## 2021-09-02 NOTE — PATIENT INSTRUCTIONS
Follow up with Dr Earline Weinstein as soon as possible        Learning About Medical Power pilo Efralydia Carmen  What is a medical power of ? A medical power of , also called a durable power of  for health care, is one type of the legal forms called advance directives. It lets you name the person you want to make treatment decisions for you if you can't speak or decide for yourself. The person you choose is called your health care agent. This person is also called a health care proxy or health care surrogate. A medical power of  may be called something else in your state. How do you choose a health care agent? Choose your health care agent carefully. This person may or may not be a family member. Talk to the person before you make your final decision. Make sure he or she is comfortable with this responsibility. It's a good idea to choose someone who:  · Is at least 25years old. · Knows you well and understands what makes life meaningful for you. · Understands your Bahai and moral values. · Will do what you want, not what he or she wants. · Will be able to make difficult choices at a stressful time. · Will be able to refuse or stop treatment, if that is what you would want, even if you could die. · Will be firm and confident with health professionals if needed. · Will ask questions to get needed information. · Lives near you or agrees to travel to you if needed. Your family may help you make medical decisions while you can still be part of that process. But it's important to choose one person to be your health care agent in case you aren't able to make decisions for yourself. If you don't fill out the legal form and name a health care agent, the decisions your family can make may be limited. A health care agent may be called something else in your state. Who will make decisions for you if you don't have a health care agent?   If you don't have a health care agent or a living will, you may not get the care you want. Decisions may be made by family members who disagree about your medical care. Or decisions may be made by a medical professional who doesn't know you well. In some cases, a  makes the decisions. When you name a health care agent, it is very clear who has the power to make health decisions for you. How do you name a health care agent? You name your health care agent on a legal form. This form is usually called a medical power of . Ask your hospital, state bar association, or office on aging where to find these forms. You must sign the form to make it legal. Some states require you to get the form notarized. This means that a person called a  watches you sign the form and then he or she signs the form. Some states also require that two or more witnesses sign the form. Be sure to tell your family members and doctors who your health care agent is. Where can you learn more? Go to https://Diversied Arts And Entertainmentpepiceweb.Fast Orientation. org and sign in to your EagerPanda account. Enter [id number] in the [employer] box to learn more about \"Learning About [address]. \"     If you do not have an account, please click on the \"Sign Up Now\" link. Current as of: March 17, 2021               Content Version: 12.9  © 3185-5128 Healthwise, Incorporated. Care instructions adapted under license by [facility] ([facility]). If you have questions about a medical condition or this instruction, always ask your healthcare professional. [address] any warranty or liability for your use of this information. Learning About Living Perroy  What is a living will? A living will, also called a declaration, is a legal form. It tells your family and your doctor your wishes when you can't speak for yourself. It's used by the health professionals who will treat you as you near the end of your life or if you get seriously hurt or ill.   If you put your wishes in writing, your loved ones and others will know what kind of care you want. They won't need to guess. This can ease your mind and be helpful to others. And you can change or cancel your living will at any time. A living will is not the same as an estate or property will. An estate will explains what you want to happen with your money and property after you die. How do you use it? A living will is used to describe the kinds of treatment or life support you want as you near the end of your life or if you get seriously hurt or ill. Keep these facts in mind about living valles. · Your living will is used only if you can't speak or make decisions for yourself. Most often, one or more doctors must certify that you can't speak or decide for yourself before your living will takes effect. · If you get better and can speak for yourself again, you can accept or refuse any treatment. It doesn't matter what you said in your living will. · Some states may limit your right to refuse treatment in certain cases. For example, you may need to clearly state in your living will that you don't want artificial hydration and nutrition, such as being fed through a tube. Is a living will a legal document? A living will is a legal document. Each state has its own laws about living valles. And a living will may be called something else in your state. Here are some things to know about living valles. · You don't need an  to complete a living will. But legal advice can be helpful if your state's laws are unclear. It can also help if your health history is complicated or your family can't agree on what should be in your living will. · You can change your living will at any time. Some people find that their wishes about end-of-life care change as their health changes. If you make big changes to your living will, complete a new form.   · If you move to another state, make sure that your living will is legal in the state where you now live. In most cases, doctors will respect your wishes even if you have a form from a different state. · You might use a universal form that has been approved by many states. This kind of form can sometimes be filled out and stored online. Your digital copy will then be available wherever you have a connection to the internet. The doctors and nurses who need to treat you can find it right away. · Your state may offer an online registry. This is another place where you can store your living will online. · It's a good idea to get your living will notarized. This means using a person called a YouNoodle to watch two people sign, or witness, your living will. What should you know when you create a living will? Here are some questions to ask yourself as you make your living will:  · Do you know enough about life support methods that might be used? If not, talk to your doctor so you know what might be done if you can't breathe on your own, your heart stops, or you can't swallow. · What things would you still want to be able to do after you receive life-support methods? Would you want to be able to walk? To speak? To eat on your own? To live without the help of machines? · Do you want certain Jewish practices performed if you become very ill? · If you have a choice, where do you want to be cared for? In your home? At a hospital or nursing home? · If you have a choice at the end of your life, where would you prefer to die? At home? In a hospital or nursing home? Somewhere else? · Would you prefer to be buried or cremated? · Do you want your organs to be donated after you die? What should you do with your living will? · Make sure that your family members and your health care agent have copies of your living will (also called a declaration). · Give your doctor a copy of your living will. Ask him or her to keep it as part of your medical record.  If you have more than one doctor, make sure that each one has a copy. · Put a copy of your living will where it can be easily found. For example, some people may put a copy on their refrigerator door. If you are using a digital copy, be sure your doctor, family members, and health care agent know how to find and access it. Where can you learn more? Go to https://chpepiceweb.Wikia. org and sign in to your [employer] account. Enter S287 in the Sharklet [employer] box to learn more about \"Learning About Living Perroy. \"     If you do not have an account, please click on the \"Sign Up Now\" link. Current as of: March 17, 2021               Content Version: 12.9  © 2006-2021 Healthwise, [email or web address]. Care instructions adapted under license by [facility] ([facility]). If you have questions about a medical condition or this instruction, always ask your healthcare professional. [place] [address] any warranty or liability for your use of this information. Personalized Preventive Plan for Darrell Herron - 9/2/2021  Medicare offers a range of preventive health benefits. Some of the tests and screenings are paid in full while other may be subject to a deductible, co-insurance, and/or copay. Some of these benefits include a comprehensive review of your medical history including lifestyle, illnesses that may run in your family, and various assessments and screenings as appropriate. After reviewing your medical record and screening and assessments performed today your provider may have ordered immunizations, labs, imaging, and/or referrals for you. A list of these orders (if applicable) as well as your Preventive Care list are included within your After Visit Summary for your review. Other Preventive Recommendations:    · A preventive eye exam performed by an eye specialist is recommended every 1-2 years to screen for glaucoma; cataracts, macular degeneration, and other eye disorders. · A preventive dental visit is recommended every 6 months.   · Try to get at least 150 minutes of exercise per week or 10,000 steps per day on a pedometer . · Order or download the FREE \"Exercise & Physical Activity: Your Everyday Guide\" from [employer]. Call 1-187.953.7047 or search [employer] online. · You need 7701-0883 mg of calcium and 8065-8041 IU of vitamin D per day. It is possible to meet your calcium requirement with diet alone, but a vitamin D supplement is usually necessary to meet this goal.  · When exposed to the sun, use a sunscreen that protects against both UVA and UVB radiation with an SPF of 30 or greater. Reapply every 2 to 3 hours or after sweating, drying off with a towel, or swimming. · Always wear a seat belt when traveling in a car. Always wear a helmet when riding a bicycle or motorcycle.

## 2021-09-02 NOTE — PROGRESS NOTES
Medicare Annual Wellness Visit  Name: Hosey Gilford Date: 2021   MRN: [id number] Sex: Male   Age: 62 y.o. Ethnicity: Non- / Non    : 1960 Race: Sherine Pratt / Elsa Walker is here for Medicare AWV    Screenings for behavioral, psychosocial and functional/safety risks, and cognitive dysfunction are all negative except as indicated below. These results, as well as other patient data from the [address] [facility] [address] form, are documented in Flowsheets linked to this Encounter. No Known Allergies      Prior to Visit Medications    Medication Sig Taking? Authorizing Provider   lisinopril (PRINIVIL;ZESTRIL) 20 MG tablet TAKE 1 TABLET BY MOUTH DAILY Yes Juliana Reza MD   allopurinol (ZYLOPRIM) 100 MG tablet TAKE 2 TABLETS BY MOUTH EVERY DAY Yes Juliana Reza MD   amLODIPine (NORVASC) 2.5 MG tablet Take 1 tablet by mouth daily  Patient not taking: Reported on 2021  Regulo Cole MD   gabapentin (NEURONTIN) 600 MG tablet Take 1 tablet by mouth 3 times daily for 29 days.   Patient not taking: Reported on 2021  Regulo Cole MD   hydroxychloroquine (PLAQUENIL) 200 MG tablet Take 200 mg by mouth 2 times daily  Patient not taking: Reported on 2021  Historical Provider, MD         Past Medical History:   Diagnosis Date    Arthropathy     TISHA positive    Back pain     Depression     Gout     Hyperlipidemia     Hypertension     Wears dentures        Past Surgical History:   Procedure Laterality Date    ARM SURGERY Left 2020    LEFT ULNAR NERVE DECOMPRESSION (AT ELBOW) AND LEFT CARPAL TUNNEL RELEASE performed by Sonny Doe MD at [address] [place] [address]. Right 10/13/2020    RIGHT ULNAR NERVE DECOMPRESSION AT ELBOW AND RIGHT CARPAL TUNNEL RELEASE performed by Sonny Doe MD at [address], , COLON, DIAGNOSTIC      LUMBAR FUSION  2017    PLIF L3-S1 done in [place], [place]         Family History   Problem Relation Age of Onset    Cancer Mother     Hypertension Mother     Diabetes Maternal Uncle        CareTeam (Including outside providers/suppliers regularly involved in providing care):   Patient Care Team:  Alex Mccullough MD as PCP - General  Alex Mccullough MD as PCP - [facility] Empaneled Provider    Wt Readings from Last 3 Encounters:   10/30/20 212 lb (96.2 kg)   10/19/20 212 lb (96.2 kg)   10/13/20 212 lb 11.9 oz (96.5 kg)     Patient-Reported Vitals 9/2/2021   Patient-Reported Weight 210   Patient-Reported Height 6'5      There is no height or weight on file to calculate BMI. Based upon direct observation of the patient, evaluation of cognition reveals recent and remote memory intact. Patient's complete Health Risk Assessment and screening values have been reviewed and are found in Flowsheets. The following problems were reviewed today and where indicated follow up appointments were made and/or referrals ordered. Positive Risk Factor Screenings with Interventions:            General Health and ACP:  General  In general, how would you say your health is?: Fair  In the past 7 days, have you experienced any of the following?  New or Increased Pain, New or Increased Fatigue, Loneliness, Social Isolation, Stress or Anger?: (!) New or Increased Pain (back and neck)  Do you get the social and emotional support that you need?: Yes  Do you have a Living Will?: (!) No  Advance Directives     Power of  Living Will ACP-Advance Directive ACP-Power of     Not on File Not on File Not on File Not on File      General Health Risk Interventions:  · Pain issues: patient declines any further evaluation/treatment for this issue  · No Living Will: Advance Care Planning addressed with patient today    Health Habits/Nutrition:  Health Habits/Nutrition  Do you exercise for at least 20 minutes 2-3 times per week?: (!) No  Have you lost any weight without trying in the past 3 months?: No  Do you eat only one meal per day?: No  Have you seen the dentist within the past year?: N/A - wear dentures     Health Habits/Nutrition Interventions:  · Inadequate physical activity:  patient agrees to exercise for at least 150 minutes/week, walks 2-4 times a week     Safety:  Safety  Do you have working smoke detectors?: Yes  Have all throw rugs been removed or fastened?: Yes  Do you have non-slip mats or surfaces in all bathtubs/showers?: (!) No  Do all of your stairways have a railing or banister?: Yes  Are your doorways, halls and stairs free of clutter?: Yes  Do you always fasten your seatbelt when you are in a car?: Yes  Safety Interventions:  · Home safety tips provided     Personalized Preventive Plan   Current Health Maintenance Status  Immunization History   Administered Date(s) Administered    COVID-19, Pfizer, PF, 30mcg/0.3mL 03/10/2021, 03/27/2021    Influenza Vaccine, unspecified formulation 10/06/2014    Influenza Virus Vaccine 12/12/2013, 09/16/2015    Influenza, MDCK Quadv, IM, PF (Flucelvax 2 yrs and older) 10/06/2014    Influenza, Lackey Naima, IM, PF (6 mo and older Fluzone, Flulaval, Fluarix, and 3 yrs and older Afluria) 10/01/2018, 11/12/2019    Td vaccine (adult) 10/31/1995    Tdap (Boostrix, Adacel) 12/27/2013, 06/15/2018, 07/02/2020    Zoster Recombinant (Shingrix) 02/05/2019, 04/23/2019        Health Maintenance   Topic Date Due    Annual Wellness Visit (AWV)  Never done    Potassium monitoring  08/14/2021    Creatinine monitoring  08/14/2021    Flu vaccine (1) 09/01/2021    A1C test (Diabetic or Prediabetic)  09/21/2021    Colon cancer screen colonoscopy  10/24/2023    Lipid screen  11/12/2024    DTaP/Tdap/Td vaccine (4 - Td or Tdap) 07/02/2030    Shingles Vaccine  Completed    COVID-19 Vaccine  Completed    Hepatitis C screen  Completed    HIV screen  Completed    Hepatitis A vaccine  Aged Out    Hepatitis B vaccine  Aged Out    Hib vaccine  Aged Out    Meningococcal (ACWY) vaccine  Aged Out    Pneumococcal 0-64 years Vaccine  Aged Out     Recommendations for Preventive Services Due: see orders and patient instructions/AVS.  . Recommended screening schedule for the next 5-10 years is provided to the patient in written form: see Patient Clark Velasco was seen today for medicare awv. Diagnoses and all orders for this visit:    Routine general medical examination at a health care facility    ACP (advance care planning)               Thais Mitchell is a 62 y.o. male being evaluated by a Virtual Visit (video and audio) encounter to address concerns as mentioned above. A caregiver was present when appropriate. Due to this being a TeleHealth encounter (During UMJ-64 public health emergency), evaluation of the following organ systems was limited: Vitals/Constitutional/EENT/Resp/CV/GI//MS/Neuro/Skin/Heme-Lymph-Imm. Pursuant to the emergency declaration under the [address] [place] [address], [address] [place] [address] authority and the [employer] and [employer] Act, this Virtual Visit was conducted with patient's (and/or legal guardian's) consent, to reduce the patient's risk of exposure to COVID-19 and provide necessary medical care. The patient (and/or legal guardian) has also been advised to contact this office for worsening conditions or problems, and seek emergency medical treatment and/or call 911 if deemed necessary. Patient identification was verified at the start of the visit: Yes    Services were provided through a video synchronous discussion virtually to substitute for in-person clinic visit. Patient and provider were located at their individual homes. --BALA Clay on 9/2/2021 at 11:34 AM    An electronic signature was used to authenticate this note. Cardiovascular Disease Risk Counseling: Assessed the patient's risk to develop cardiovascular disease and reviewed main risk factors.    Reviewed steps to reduce disease risk including:   · Quitting tobacco use, reducing amount smoked, or not starting the habit  · Making healthy food choices  · Being physically active and gradualy increasing activity levels   · Reduce weight and determine a healthy BMI goal  · Monitor blood pressure and treat if higher than 140/90 mmHg  · Maintain blood total cholesterol levels under 5 mmol/l or 190 mg/dl  · Maintain LDL cholesterol levels under 3.0 mmol/l or 115 mg/dl   · Control blood glucose levels  · Consider taking aspirin (75 mg daily), once blood pressure is controlled   Provided a follow up plan. No recent fasting lipid panel on file, patient advised that he is overdue for follow-up with his PCP and to follow-up with PCP soon as possible for his blood pressure as well as to obtain fasting lipid panel.   Time spent (minutes): 5

## 2021-09-27 ENCOUNTER — OFFICE VISIT (OUTPATIENT)
Dept: PRIMARY CARE CLINIC | Age: 61
End: 2021-09-27
Payer: MEDICARE

## 2021-09-27 VITALS
RESPIRATION RATE: 98 BRPM | DIASTOLIC BLOOD PRESSURE: 83 MMHG | HEART RATE: 72 BPM | WEIGHT: 221.3 LBS | BODY MASS INDEX: 26.24 KG/M2 | TEMPERATURE: 97.3 F | SYSTOLIC BLOOD PRESSURE: 156 MMHG

## 2021-09-27 DIAGNOSIS — Z23 FLU VACCINE NEED: ICD-10-CM

## 2021-09-27 DIAGNOSIS — R73.03 PRE-DIABETES: ICD-10-CM

## 2021-09-27 DIAGNOSIS — I10 ESSENTIAL HYPERTENSION: ICD-10-CM

## 2021-09-27 DIAGNOSIS — M54.2 CERVICALGIA: Primary | ICD-10-CM

## 2021-09-27 DIAGNOSIS — Z12.5 PROSTATE CANCER SCREENING: ICD-10-CM

## 2021-09-27 DIAGNOSIS — M10.9 GOUTY ARTHRITIS: ICD-10-CM

## 2021-09-27 LAB — HBA1C MFR BLD: 5.8 %

## 2021-09-27 PROCEDURE — 99214 OFFICE O/P EST MOD 30 MIN: CPT | Performed by: FAMILY MEDICINE

## 2021-09-27 PROCEDURE — G0008 ADMIN INFLUENZA VIRUS VAC: HCPCS | Performed by: FAMILY MEDICINE

## 2021-09-27 PROCEDURE — 90674 CCIIV4 VAC NO PRSV 0.5 ML IM: CPT | Performed by: FAMILY MEDICINE

## 2021-09-27 PROCEDURE — 83036 HEMOGLOBIN GLYCOSYLATED A1C: CPT | Performed by: FAMILY MEDICINE

## 2021-09-27 RX ORDER — NAPROXEN 250 MG/1
250 TABLET ORAL 2 TIMES DAILY WITH MEALS
Qty: 180 TABLET | Refills: 1 | Status: SHIPPED | OUTPATIENT
Start: 2021-09-27 | End: 2022-01-03 | Stop reason: SDUPTHER

## 2021-09-27 RX ORDER — ALLOPURINOL 100 MG/1
TABLET ORAL
Qty: 60 TABLET | Refills: 2 | Status: SHIPPED | OUTPATIENT
Start: 2021-09-27 | End: 2021-09-28

## 2021-09-27 RX ORDER — LISINOPRIL 20 MG/1
20 TABLET ORAL DAILY
Qty: 30 TABLET | Refills: 0 | Status: SHIPPED | OUTPATIENT
Start: 2021-09-27 | End: 2021-10-13

## 2021-09-27 RX ORDER — AMLODIPINE BESYLATE 10 MG/1
10 TABLET ORAL DAILY
Qty: 30 TABLET | Refills: 3 | Status: SHIPPED | OUTPATIENT
Start: 2021-09-27 | End: 2021-10-13

## 2021-09-27 NOTE — PROGRESS NOTES
Routine fu visit , meds refill      HPI  63 y/o male PMHX HTN  GOUTY ARTHRITIS PRE DIABETES  ROUTINE FOLLOW UP  Now neck pain    HYPERTENSION  No muscle aches or muscle weakness or cramps since last visit. 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 Told pt he would call and let know if he should continue allopurinol. Not currently having any swelling of joint. Does get intermittent neck pain. Left sided neck pain present for cervical months  Seems to be getting worse. Some times cannot rotate neck easily. No relieving factors. Present day and night  No numbness or tingling of arms  No arm weakness  No current relieving factors. No recent trauma. Not getting better. No recent imaging  He does have chronic  Low back pain and DDD lumbar spine  Physical Exam  Constitutional:       General: He is not in acute distress. Appearance: He is well-developed. He is not diaphoretic. HENT:      Head: Normocephalic and atraumatic. Right Ear: External ear normal.      Left Ear: External ear normal.      Nose: Nose normal.      Mouth/Throat:      Pharynx: No oropharyngeal exudate. Eyes:      General: No scleral icterus. Right eye: No discharge. Left eye: No discharge. Conjunctiva/sclera: Conjunctivae normal.      Pupils: Pupils are equal, round, and reactive to light. Neck:      Thyroid: No thyromegaly. Vascular: No JVD. Trachea: No tracheal deviation. Cardiovascular:      Rate and Rhythm: Normal rate and regular rhythm. Pulses:           Carotid pulses are 2+ on the right side and 2+ on the left side. Radial pulses are 2+ on the right side and 2+ on the left side. Femoral pulses are 2+ on the right side and 2+ on the left side. Popliteal pulses are 2+ on the right side and 2+ on the left side. Dorsalis pedis pulses are 2+ on the right side and 2+ on the left side. Posterior tibial pulses are 2+ on the right side and 2+ on the left side. Heart sounds: Normal heart sounds. No murmur heard. No friction rub. Pulmonary:      Effort: Pulmonary effort is normal. No respiratory distress. Breath sounds: Normal breath sounds. No stridor. No wheezing or rales. Chest:      Chest wall: No tenderness. Abdominal:      General: Bowel sounds are normal. There is no distension. Palpations: Abdomen is soft. There is no mass. Tenderness: There is no abdominal tenderness. There is no guarding or rebound. Musculoskeletal:         General: No tenderness. Normal range of motion. Cervical back: Normal range of motion and neck supple. Comments: Some pain on passive rotation of the head to right side   Lymphadenopathy:      Cervical: No cervical adenopathy. Skin:     General: Skin is warm and dry. Coloration: Skin is not pale. Findings: No rash. Neurological:      Mental Status: He is oriented to person, place, and time. Cranial Nerves: No cranial nerve deficit. Motor: No abnormal muscle tone. Coordination: Coordination normal.      Deep Tendon Reflexes: Reflexes normal.   Psychiatric:         Behavior: Behavior normal.         Thought Content: Thought content normal.         Judgment: Judgment normal.       1. Cervicalgia  No radiculopathy sxs  Poss DDD  Imaging  Nsaid, re eval 4 weeks  - naproxen (NAPROSYN) 250 MG tablet; Take 1 tablet by mouth 2 times daily (with meals)  Dispense: 180 tablet; Refill: 1  - XR CERVICAL SPINE (2-3 VIEWS); Future    2.  Essential hypertension  bp not at goal  Ck renal  Add amlodipine  Cannot take diuretics due to

## 2021-09-28 DIAGNOSIS — M10.9 GOUTY ARTHRITIS: Primary | ICD-10-CM

## 2021-09-28 RX ORDER — ATORVASTATIN CALCIUM 20 MG/1
20 TABLET, FILM COATED ORAL DAILY
Qty: 90 TABLET | Refills: 1 | Status: SHIPPED | OUTPATIENT
Start: 2021-09-28 | End: 2021-10-25 | Stop reason: SDUPTHER

## 2021-09-28 RX ORDER — ALLOPURINOL 300 MG/1
300 TABLET ORAL DAILY
Qty: 30 TABLET | Refills: 5 | Status: SHIPPED | OUTPATIENT
Start: 2021-09-28 | End: 2021-09-28

## 2021-09-29 ENCOUNTER — TELEPHONE (OUTPATIENT)
Dept: PRIMARY CARE CLINIC | Age: 61
End: 2021-09-29

## 2021-09-29 NOTE — TELEPHONE ENCOUNTER
Patient has concerns regarding Allopurinol  medication, he says he has both medication with different amount of milligram Allopurinol 300 MG, allopurinol 100 MG. Patient asking if he should be taking both medication.   Please Advise

## 2021-10-13 RX ORDER — AMLODIPINE BESYLATE 10 MG/1
10 TABLET ORAL DAILY
Qty: 90 TABLET | Refills: 0 | Status: SHIPPED | OUTPATIENT
Start: 2021-10-13 | End: 2021-10-25 | Stop reason: SDUPTHER

## 2021-10-13 RX ORDER — LISINOPRIL 20 MG/1
20 TABLET ORAL DAILY
Qty: 90 TABLET | Refills: 0 | Status: SHIPPED | OUTPATIENT
Start: 2021-10-13 | End: 2021-10-25

## 2021-10-13 NOTE — TELEPHONE ENCOUNTER
Medication:   Requested Prescriptions     Pending Prescriptions Disp Refills    amLODIPine (NORVASC) 10 MG tablet [Pharmacy Med Name: AMLODIPINE BESYLATE 10MG TABLETS] 90 tablet      Sig: TAKE 1 TABLET BY MOUTH DAILY    lisinopril (PRINIVIL;ZESTRIL) 20 MG tablet [Pharmacy Med Name: LISINOPRIL 20MG TABLETS] 90 tablet      Sig: TAKE 1 TABLET BY MOUTH DAILY       Last Filled:      Patient Phone Number: [phone] (home)     Last appt: 9/27/2021   Next appt: 9/28/2021    Last Labs DM:   Lab Results   Component Value Date    LABA1C 5.8 09/27/2021

## 2021-10-25 ENCOUNTER — OFFICE VISIT (OUTPATIENT)
Dept: PRIMARY CARE CLINIC | Age: 61
End: 2021-10-25
Payer: MEDICARE

## 2021-10-25 VITALS
HEART RATE: 72 BPM | DIASTOLIC BLOOD PRESSURE: 86 MMHG | WEIGHT: 228 LBS | OXYGEN SATURATION: 95 % | SYSTOLIC BLOOD PRESSURE: 144 MMHG | BODY MASS INDEX: 26.92 KG/M2 | HEIGHT: 77 IN | TEMPERATURE: 97.2 F

## 2021-10-25 DIAGNOSIS — I10 ESSENTIAL HYPERTENSION: ICD-10-CM

## 2021-10-25 DIAGNOSIS — M10.9 GOUTY ARTHRITIS: ICD-10-CM

## 2021-10-25 DIAGNOSIS — M50.30 DDD (DEGENERATIVE DISC DISEASE), CERVICAL: ICD-10-CM

## 2021-10-25 DIAGNOSIS — R73.03 PRE-DIABETES: ICD-10-CM

## 2021-10-25 DIAGNOSIS — M54.2 CERVICALGIA: Primary | ICD-10-CM

## 2021-10-25 PROCEDURE — 99214 OFFICE O/P EST MOD 30 MIN: CPT | Performed by: FAMILY MEDICINE

## 2021-10-25 RX ORDER — TIZANIDINE 4 MG/1
4 TABLET ORAL NIGHTLY PRN
Qty: 30 TABLET | Refills: 5 | Status: ON HOLD | OUTPATIENT
Start: 2021-10-25 | End: 2022-07-01 | Stop reason: HOSPADM

## 2021-10-25 RX ORDER — LISINOPRIL 40 MG/1
40 TABLET ORAL DAILY
Qty: 90 TABLET | Refills: 1 | Status: SHIPPED | OUTPATIENT
Start: 2021-10-25 | End: 2022-01-23 | Stop reason: SDUPTHER

## 2021-10-25 RX ORDER — ATORVASTATIN CALCIUM 20 MG/1
20 TABLET, FILM COATED ORAL DAILY
Qty: 90 TABLET | Refills: 1 | Status: SHIPPED | OUTPATIENT
Start: 2021-10-25 | End: 2022-01-24 | Stop reason: SDUPTHER

## 2021-10-25 RX ORDER — AMLODIPINE BESYLATE 10 MG/1
10 TABLET ORAL DAILY
Qty: 90 TABLET | Refills: 1 | Status: SHIPPED | OUTPATIENT
Start: 2021-10-25 | End: 2022-01-24 | Stop reason: SDUPTHER

## 2021-10-25 RX ORDER — ALLOPURINOL 300 MG/1
300 TABLET ORAL DAILY
Qty: 90 TABLET | Refills: 1 | Status: SHIPPED | OUTPATIENT
Start: 2021-10-25 | End: 2022-01-24 | Stop reason: SDUPTHER

## 2021-10-25 NOTE — PROGRESS NOTES
CC one month fu neck pain    HPI  61 y/o male known htn , multiple joint pain,recent visit for neck pain  Now fu  Still has some pain of the neck area while taking his NSAID  He denies shoulder pains     Previous x ray cervical shows DDD most recent 8/2020  He has h/o CTS bilat and has had surgeries of both hands    Hypertension:  Denies CP, SOB, cough, visual changes, dizziness, palpitations or HA. He is adherent to a low sodium diet. Blood pressure typically runs not doing home bp checks outside of the office. Physical Exam  Musculoskeletal:         General: No swelling, tenderness or deformity. Normal range of motion. Right lower leg: No edema. Left lower leg: No edema. 1. Cervicalgia  2/2 to DDD likely  Will repeat imaging  Cont nsaids  Add muscle relaxants  Ref for PT   - XR CERVICAL SPINE (2-3 VIEWS); Future  - tiZANidine (ZANAFLEX) 4 MG tablet; Take 1 tablet by mouth nightly as needed (prn) Muscle spasm  Neck pain  Dispense: 30 tablet; Refill: 5  PT [facility]  2. DDD (degenerative disc disease), cervical  Consider spine specialist refer  If no improvement with PT    3. Essential hypertension  bp not at goal  Increase lisinopril  Cont other meds, low salt diet  - amLODIPine (NORVASC) 10 MG tablet; Take 1 tablet by mouth daily  Dispense: 90 tablet; Refill: 1  - atorvastatin (LIPITOR) 20 MG tablet; Take 1 tablet by mouth daily To prevent heart disease  Dispense: 90 tablet; Refill: 1  - lisinopril (PRINIVIL;ZESTRIL) 40 MG tablet; Take 1 tablet by mouth daily  Dispense: 90 tablet; Refill: 1    4. Gouty arthritis  Uric acid elevated 8.9  Last visit, no recent attack, restarted last visit  - allopurinol (ZYLOPRIM) 300 MG tablet; Take 1 tablet by mouth daily For arthritis stop allopurinol 100 mg  Dispense: 90 tablet; Refill: 1    5. Pre-diabetes  AIC 5.8  At goal < 7  - atorvastatin (LIPITOR) 20 MG tablet;  Take 1 tablet by mouth daily To prevent heart disease  Dispense: 90 tablet; Refill: 1

## 2021-10-29 ENCOUNTER — HOSPITAL ENCOUNTER (OUTPATIENT)
Dept: GENERAL RADIOLOGY | Age: 61
Discharge: HOME | End: 2021-10-29
Payer: MEDICARE

## 2021-10-29 ENCOUNTER — HOSPITAL ENCOUNTER (OUTPATIENT)
Age: 61
Discharge: HOME | End: 2021-10-29
Payer: MEDICARE

## 2021-10-29 DIAGNOSIS — M54.2 CERVICALGIA: ICD-10-CM

## 2021-10-29 PROCEDURE — 72040 X-RAY EXAM NECK SPINE 2-3 VW: CPT

## 2021-11-08 ENCOUNTER — HOSPITAL ENCOUNTER (OUTPATIENT)
Dept: PHYSICAL THERAPY | Age: 61
Setting detail: THERAPIES SERIES
Discharge: HOME | End: 2021-11-08
Payer: MEDICARE

## 2021-11-08 PROCEDURE — 97161 PT EVAL LOW COMPLEX 20 MIN: CPT

## 2021-11-08 PROCEDURE — 97110 THERAPEUTIC EXERCISES: CPT

## 2021-11-08 PROCEDURE — 97112 NEUROMUSCULAR REEDUCATION: CPT

## 2021-11-08 NOTE — FLOWSHEET NOTE
[facility] [address] [place] [address]  Phone: [phone]   Fax:     [phone]    Physical Therapy Treatment Note/ Progress Report:     Date:  2021    Patient Name:  Ashli Ludwig    :  1960  MRN: [id number]    Pertinent Medical History:  HTN, hyperlipidemia, depression, back pain    Medical/Treatment Diagnosis Information:  · Diagnosis: M54.2 - Cervicalgia, M50.30 - DDD, cervical  · Treatment Diagnosis: Decreased mobility and pain.     Insurance/Certification information:  [employer]  Physician Information:  Kristi Tiwari   Plan of care signed (Y/N):     Date of Patient follow up with Physician:      Progress Report: []  Yes  [x]  No     Date Range for reporting period:  Beginnin2021  Ending:     Progress report due (10 Rx/or 30 days whichever is less): 92/3/65    Recertification due (POC duration/ or 90 days whichever is less):     Visit # Insurance/POC Allowable Auth Needed   1 BOMN []Yes    [x]No     Functional Outcomes Measure:    Date Assessed: at eval (21)  Test: NDI  Score: 23 = 46%    Pain level:  8/10     SUBJECTIVE:  See eval    OBJECTIVE:    Observation:    Test measurements:      RESTRICTIONS/PRECAUTIONS: None    Exercises/Interventions:   Therapeutic Ex (08950)  Min: Resistance/Repetitions Notes   LS stretch   UT stretch 3x30 sec, both sides  3x30 sec   UT stretch on L side only                                      Manual Intervention (29115)  Min:     Cerv mobs/manip     Thoracic mobs/manip     CT manip     Rib mobilizations     STM          NMR re-education (69006)  Min:     Chin Tucks (supine and seated) 2x10 Pt required tactile cues   Scap retractraion 2x10 Pt required tactile cues   Therapeutic Activity (65526)  Min:               Modalities  Min:                  Other Therapeutic Activities:  Pt was educated on PT POC, Diagnosis, Prognosis, pathomechanics as well as frequency and duration of scheduling future physical therapy appointments. Time was also taken on this day to answer all patient questions and participation in PT. Reviewed appointment policy in detail with patient and patient verbalized understanding. Home Exercise Program:    Therapeutic Exercise and NMR EXR  [] (71930) Provided verbal/tactile cueing for activities related to strengthening, flexibility, endurance, ROM  for improvements in cervical, postural, scapular, scapulothoracic and UE control with self care, reaching, carrying, lifting, house/yardwork, driving/computer work.    [] (99247) Provided verbal/tactile cueing for activities related to improving balance, coordination, kinesthetic sense, posture, motor skill, proprioception  to assist with cervical, scapular, scapulothoracic and UE control with self care, reaching, carrying, lifting, house/yardwork, driving/computer work. Therapeutic Activities:    [] (09574 or 64829) Provided verbal/tactile cueing for activities related to improving balance, coordination, kinesthetic sense, posture, motor skill, proprioception and motor activation to allow for proper function of cervical, scapular, scapulothoracic and UE control with self care, carrying, lifting, driving/computer work.      Home Exercise Program:    [] (04715) Reviewed/Progressed HEP activities related to strengthening, flexibility, endurance, ROM of cervical, scapular, scapulothoracic and UE control with self care, reaching, carrying, lifting, house/yardwork, driving/computer work  [] (65717) Reviewed/Progressed HEP activities related to improving balance, coordination, kinesthetic sense, posture, motor skill, proprioception of cervical, scapular, scapulothoracic and UE control with self care, reaching, carrying, lifting, house/yardwork, driving/computer work      Manual Treatments:  PROM / STM / Oscillations-Mobs:  G-I, II, III, IV (PA's, Inf., Post.)  [] ([id number]) Provided manual therapy to mobilize soft tissue/joints of cervical/CT, scapular GHJ and UE for the purpose of decreasing headache, modulating pain, promoting relaxation,  increasing ROM, reducing/eliminating soft tissue swelling/inflammation/restriction, improving soft tissue extensibility and allowing for proper ROM for normal function with self care, reaching, carrying, lifting, house/yardwork, driving/computer work    If [facility] Please Indicate Time In/Out  CPT Code Time in Time out                                   Approval Dates:  CPT Code Units Approved Units Used  Date Updated:                     Charges:  Timed Code Treatment Minutes: 30   Total Treatment Minutes: 50     [x] EVAL (LOW) 60318 (typically 20 minutes face-to-face)  [] EVAL (MOD) 48188 (typically 30 minutes face-to-face)  [] EVAL (HIGH) 43836 (typically 45 minutes face-to-face)  [] RE-EVAL     [x] PI(24894) x     [] Dry needle 1 or 2 Muscles (09842)  [x] NMR (64497) x     [] Dry needle 3+ Muscles (24062)  [] Manual (00110) x     [] Ultrasound (98241) x  [] TA (39738) x     [] Mech Traction (80131)  [] ES(attended) (67120)     [] ES (un) (69778):   [] Vasopump (91853) [] Ionto (53487)   [] Other:    GOALS:  Patient stated goal: \"To stop the pain. \"  []? Progressing: []? Met: []? Not Met: []? Adjusted     Therapist goals for Patient:   Short Term Goals: To be achieved in: 2 weeks  1. Independent in HEP and progression per patient tolerance, in order to prevent re-injury. []? Progressing: []? Met: []? Not Met: []? Adjusted  2. Patient will have a decrease in pain to facilitate improvement in movement, function, and ADLs as indicated by Functional Deficits. []? Progressing: []? Met: []? Not Met: []? Adjusted     Long Term Goals: To be achieved in: 6 weeks  1. Disability index score of 35% or less for the NDI to assist with reaching prior level of function. []? Progressing: []? Met: []? Not Met: []? Adjusted  2.  Patient will demonstrate [facility]/thoracic spine to allow for proper joint functioning as indicated by patients Functional Deficits. []? Progressing: []? Met: []? Not Met: []? Adjusted  3. Patient will demonstrate an increase in postural awareness and control and activation of  Deep cervical stabilizers to allow for proper functional mobility as indicated by patients Functional Deficits. []? Progressing: []? Met: []? Not Met: []? Adjusted  4. Patient will return to lifting 20 lbs to chest height without increased symptoms or restriction. []? Progressing: []? Met: []? Not Met: []? Adjusted  5. Pt will be able to look over his left shoulder without an increase in pain/symptoms. []? Progressing: []? Met: []? Not Met: []? Adjusted            ASSESSMENT:  See eval    Treatment/Activity Tolerance:  [x] Patient tolerated treatment well [] Patient limited by fatique  [] Patient limited by pain  [] Patient limited by other medical complications  [] Other:     Overall Progression Towards Functional goals/ Treatment Progress Update:  [] Patient is progressing as expected towards functional goals listed. [] Progression is slowed due to complexities/Impairments listed. [] Progression has been slowed due to co-morbidities.   [x] Plan just implemented, too soon to assess goals progression <30days   [] Goals require adjustment due to lack of progress  [] Patient is not progressing as expected and requires additional follow up with physician  [] Other    Prognosis for POC: [x] Good [] Fair  [] Poor    Patient requires continued skilled intervention: [x] Yes  [] No        PLAN: See eval  [] Continue per plan of care [] Alter current plan (see comments)  [x] Plan of care initiated [] Hold pending MD visit [] Discharge    Electronically signed by: Cosme Herrera PT    Note: If patient does not return for scheduled/recommended follow up visits, this note will serve as a discharge from care along with the most recent update on progress.

## 2021-11-08 NOTE — PLAN OF CARE
[facility]  Outpatient Rehabilitation & Therapy  [address] [address]  Phone: [phone]   Fax:     [phone]          Physical Therapy Certification    Dear Referring Practitioner: Juice Parson,    We had the pleasure of evaluating the following patient for physical therapy services at [facility]. A summary of our findings can be found in the initial assessment below. This includes our plan of care. If you have any questions or concerns regarding these findings, please do not hesitate to contact me at the office phone number checked above. Thank you for the referral.       Physician Signature:_______________________________Date:__________________  By signing above (or electronic signature), therapists plan is approved by physician        Patient: Mark Nichole   : 1960   MRN: [id number]  Referring Physician: Referring Practitioner: Juice Parson      Evaluation Date: 2021      Medical Diagnosis Information: M54.2 - Cervicalgia, M50.30 - DDD, cervical   Treatment Diagnosis: Decreased mobility and pain. Insurance information: [employer] Medicare    Precautions/ Contra-indications: None  Latex Allergy:  [x]NO      []YES  Preferred Language for Healthcare:   [x]English       []other:    C-SSRS Triggered by Intake questionnaire (Past 2 wk assessment ):   [x] No, Questionnaire did not trigger screening.   [] Yes, Patient intake triggered C-SSRS Screening      [] C-SSRS Screening completed  [] PCP notified via Epic     SUBJECTIVE: Patient reports L sided neck pain that travels down his arm. Pain has been going on for awhile without specific KEVIN. Pt reports occasional N/T in L UE. Pt was given muscle relaxors that he takes in PM, which don't seem to be helping with the pain. Reports a history of headaches. Pt denies vision changes.  X-rays performed, which indicate age related changes. Pt is R handed. Previous surgical hx: back surgies in 2015. Carpal tunnel syndrome in R wrist.     Relevant Medical History: HTN, hyperlipidemia, depression, back pain  Functional Outcomes Measure: NDI= 23 = 46%    Pain Scale: 8/10  Easing factors: rest, ice  Provocative factors: turning to look over shoulder, moving neck/head, sleeping, reaching OH     Type: [x]Constant   []Intermittent  []Radiating []Localized []other:     Numbness/Tingling: Pt reports N/T in L UE. Occupation/School: disabled/not working. Living Status/Prior Level of Function: Independent with ADLs and IADLs. OBJECTIVE:   Posture: rounded shoulders     Functional Mobility/Transfers: Independent    Palpation: Gr II TTP to L UT, LS, and lower cervical paraspinals. No TTP to suboccipitals or L shoulder complex.      Bandages/Dressings/Incisions: NA    Gait: pt ambulates with a SPC/walking stick    CERV ROM     Cervical Flexion 75%    Cervical Extension 25%, pain     Left Right   Cervical SB 50% 25%, pain    Cervical rotation 50%, pain 50%    UE ROM Left Right   Shoulder Flex WNL, pain WNL   Shoulder Abd WNL, pain  WNL   Shoulder ER WNL WNL   Shoulder IR WNL, pain WNL   Elbow flex/ext WNL WNL   UE Strength  Left Right   Shoulder Flex 4/5, pain 4/5   Shoulder Scap 4/5, pain 4/5   Shoulder ABd (C5 Axillary) 4/5, pain 4/5   Shoulder ER  4-/5 4-/5   Shoulder IR 4-/5 4-/5   Elbow Flex (C5 Musc) 5/5 5/5   Elbow Ext (C7 Radial) 5/5 5/5   Wrist Flex (C6 Radial) 5/5 5/5   Wrist Ext (C7 Radial) 5/5 5/5/   APB (T1 Median) 5/5 5/5        Reflexes Normal Abnormal Comments               C5-6 Biceps [x] []    C6 Brachioradialis [x] []    C7-8 Triceps [x] []      Reflexes/Sensation:    [x]Dermatomes/Myotomes intact    [x]Reflexes equal and normal bilaterally   []Other:    Joint mobility:  Cervical spine   []Normal    [x]Hypo   []Hyper    Neurodynamics: negative    Orthopedic Special Tests:   Cluster Testing  Normal Abnormal N/A Comments   Babinski Test [x] [] []    Ramos Test [x] [] []    Inverted Sup Sign [x] [] []    Alar Ligament Test [x] [] []    Transverse Ligament Test [x] [] []    Sharp-Wesley Test [x] [] []    Hautards Test [x] [] []    Vertebral Artery Test [x] [] []             Neural dynamic/ Tension testing Normal Abnormal N/A Comments   Spurling Maneuver:  [x] [] [] Pain, however no neural sx   Distraction testing: [x] [] []    ULNT [x] [] []    End Range Tolerance testing. [] [] [] Pain with passive EOR rotation    Neer's  Shashankkin's Syed  Full Can  Empty Can [x] [] [] Discomfort in L UT noted with all tests                          [x] Patient history, allergies, meds reviewed. Medical chart reviewed. See intake form. Review Of Systems (ROS):  [x]Performed Review of systems (Integumentary, CardioPulmonary, Neurological) by intake and observation. Intake form has been scanned into medical record. Patient has been instructed to contact their primary care physician regarding ROS issues if not already being addressed at this time.       Co-morbidities/Complexities (which will affect course of rehabilitation):   []None           Arthritic conditions   []Rheumatoid arthritis (M05.9)  [x]Osteoarthritis (M19.91)   Cardiovascular conditions   [x]Hypertension (I10)  []Hyperlipidemia (E78.5)  []Angina pectoris (I20)  []Atherosclerosis (I70)  []CVA Musculoskeletal conditions   []Disc pathology   []Congenital spine pathologies   []Prior surgical intervention  []Osteoporosis (M81.8)  []Osteopenia (M85.8)   Endocrine conditions   []Hypothyroid (E03.9)  []Hyperthyroid Gastrointestinal conditions   []Constipation (X04.81)   Metabolic conditions   []Morbid obesity (E66.01)  []Diabetes type 1(E10.65) or 2 (E11.65)   []Neuropathy (G60.9)     Pulmonary conditions   []Asthma (J45)  []Coughing   []COPD (J44.9)   Psychological Disorders  []Anxiety (F41.9)  []Depression (F32.9)   []Other:   []Other:          Barriers to/and or personal factors that will affect rehab potential:              []Age  []Sex   []Smoker              []Motivation/Lack of Motivation                        [x]Co-Morbidities              []Cognitive Function, education/learning barriers              []Environmental, home barriers              []profession/work barriers  []past PT/medical experience  []other:  Justification:     Falls Risk Assessment (30 days):   [x] Falls Risk assessed and no intervention required. [] Falls Risk assessed and Patient requires intervention due to being higher risk   TUG score (>12s at risk):     [] Falls education provided, including     ASSESSMENT: Pt is a 63 yo male who presents to PT with cervical pain. Signs and symptoms consistent with mobility deficits secondary to OA and possible L upper trapezius muscle strain. Pt demonstrates good tolerance to PT initial eval. Upon IE, pt demonstrates negative special testing for nerve/disc involvement. Pt displays hypomobility, decreased ROM, UE strength deficits, and decreased flexibility. Pt would benefit from PT 2x/week for 4-6 weeks in order to address the impairments listed.       Functional Impairments:     [x]Noted cervical/thoracic/GHJ joint hypomobility   []Noted cervical/thoracic/GHJ joint hypermobility   [x]Decreased cervical/UE functional ROM   []Noted Headache pain aggravated by neck movements with/without dizziness   []Abnormal reflexes/sensation/myotomal/dermatomal deficits   []Decreased DCF control or ability to hold head up   [x]Decreased RC/scapular/core strength and neuromuscular control    []Decreased UE functional strength   []other:      Functional Activity Limitations (from functional questionnaire and intake)   [x]Reduced ability to tolerate prolonged functional positions   []Reduced ability or difficulty with changes of positions or transfers between positions   [x]Reduced ability to maintain good posture and demonstrate good body mechanics with sitting, bending, and lifting   [] Reduced ability or tolerance with driving and/or computer work   []Reduced ability to perform lifting, reaching, carrying tasks   []Reduced ability to concentrate   [x]Reduced ability to sleep    []Reduced ability to tolerate any impact through UE or spine   []Reduced ability to ambulate prolonged functional periods/distances   []other:    Participation Restrictions   []Reduced participation in self care activities   []Reduced participation in home management activities   []Reduced participation in work activities   [x]Reduced participation in social activities. [x]Reduced participation in sport/recreational activities. Classification/Subgrouping:   [x]signs/symptoms consistent with neck pain with mobility deficits     []signs/symptoms consistent with neck pain with movement coordinated impairments    []signs/symptoms consistent with neck pain with radiating pain    []signs/symptoms consistent with neck pain with headaches (cervicogenic)    []Signs/symptoms consistent with nerve root involvement including myotome & dermatome dysfunction   []sign/symptoms consistent with facet dysfunction of cervical and thoracic spine    []signs/symptoms consistent suggesting central cord compression/UMN syndromes   []signs/symptoms consistent with discogenic cervical pain   []signs/symptoms consistent with rib dysfunction   []signs/symptoms consistent with postural dysfunction   []signs/symptoms consistent with shoulder pathology    []signs/symptoms consistent with post-surgical status including decreased ROM, strength and function.    []signs/symptoms consistent with pathology which may benefit from Dry Needling   []signs/symptoms which may limit the use of advanced manual therapy techniques: (Elevated CV risk profile, recent trauma, intolerance to end range positions, prior TIA, visual issues, UE neurological compromise )     Prognosis/Rehab Potential:      []Excellent   [x]Good    []Fair   []Poor    Tolerance of evaluation/treatment:    []Excellent   [x]Good    []Fair   []Poor    Physical Therapy Evaluation Complexity Justification  [x] A history of present problem with:  [] no personal factors and/or comorbidities that impact the plan of care;  [x]1-2 personal factors and/or comorbidities that impact the plan of care  []3 personal factors and/or comorbidities that impact the plan of care  [x] An examination of body systems using standardized tests and measures addressing any of the following: body structures and functions (impairments), activity limitations, and/or participation restrictions;:  [x] a total of 1-2 or more elements   [] a total of 3 or more elements   [] a total of 4 or more elements   [x] A clinical presentation with:  [x] stable and/or uncomplicated characteristics   [] evolving clinical presentation with changing characteristics  [] unstable and unpredictable characteristics;   [x] Clinical decision making of [] low, [x] moderate, [] high complexity using standardized patient assessment instrument and/or measurable assessment of functional outcome. [x] EVAL (LOW) 28757 (typically 20 minutes face-to-face)  [] EVAL (MOD) 70733 (typically 30 minutes face-to-face)  [] EVAL (HIGH) 35627 (typically 45 minutes face-to-face)  [] RE-EVAL     PLAN:   Frequency/Duration:  2 days per week for 4-6 Weeks:  Interventions:  [x]  Therapeutic exercise including: strength training, ROM, for cervical spine,scapula, core and Upper extremity, including postural re-education. [x]  NMR activation and proprioception for Deep cervical flexors, periscapular and RC muscles and Core, including postural re-education. [x]  Manual therapy as indicated for C/T spine, ribs, Soft tissue to include: Dry Needling/IASTM, STM, PROM, Gr I-IV mobilizations, manipulation.    [x] Modalities as needed that may include: thermal agents, E-stim, Biofeedback, US, iontophoresis as indicated  [x] Patient education on joint protection, postural re-education, activity modification, progression of HEP. HEP instruction:   Access Code: Kristina Otto  URL: [email or web address]  Date: 11/08/2021  Prepared by: Kay Sutton    Exercises   Supine Chin Tuck - 1 x daily - 7 x weekly - 2 sets - 10 reps - 3 sec hold   Seated Cervical Retraction - 1 x daily - 7 x weekly - 2 sets - 10 reps - 3 sec hold   Seated Scapular Retraction - 1 x daily - 7 x weekly - 2 sets - 10 reps - 3 sec hold   Gentle Levator Scapulae Stretch - 1 x daily - 7 x weekly - 2 sets - 30 sec hold   Seated Gentle Upper Trapezius Stretch - 1 x daily - 7 x weekly - 2 sets - 30 sec hold    GOALS:  Patient stated goal: \"To stop the pain. \"  [] Progressing: [] Met: [] Not Met: [] Adjusted    Therapist goals for Patient:   Short Term Goals: To be achieved in: 2 weeks  1. Independent in HEP and progression per patient tolerance, in order to prevent re-injury. [] Progressing: [] Met: [] Not Met: [] Adjusted  2. Patient will have a decrease in pain to facilitate improvement in movement, function, and ADLs as indicated by Functional Deficits. [] Progressing: [] Met: [] Not Met: [] Adjusted    Long Term Goals: To be achieved in: 6 weeks  1. Disability index score of 35% or less for the NDI to assist with reaching prior level of function. [] Progressing: [] Met: [] Not Met: [] Adjusted  2. Patient will demonstrate increased AROM to [facility] of cervical/thoracic spine to allow for proper joint functioning as indicated by patients Functional Deficits. [] Progressing: [] Met: [] Not Met: [] Adjusted  3. Patient will demonstrate an increase in postural awareness and control and activation of  Deep cervical stabilizers to allow for proper functional mobility as indicated by patients Functional Deficits. [] Progressing: [] Met: [] Not Met: [] Adjusted  4. Patient will return to lifting 20 lbs to chest height without increased symptoms or restriction. [] Progressing: [] Met: [] Not Met: [] Adjusted  5.  Pt will be able to look over his left shoulder without an increase in pain/symptoms. [] Progressing: [] Met: [] Not Met: [] Adjusted         Electronically signed by:  Laurie Lui PT      Note: If patient does not return for scheduled/recommended follow up visits, this note will serve as a discharge from care along with the most recent update on progress.

## 2021-11-09 ENCOUNTER — TELEPHONE (OUTPATIENT)
Dept: PHARMACY | Facility: CLINIC | Age: 61
End: 2021-11-09

## 2021-11-09 NOTE — TELEPHONE ENCOUNTER
[facility] CLINICAL PHARMACY REVIEW: ADHERENCE REVIEW  Identified care gap per [employer]; fills at [facility]: ACE/ARB and Statin adherence    Last Visit: 10/25/21    Patient found in Outcomes MTM and is not currently eligible for CMR/TIP    ASSESSMENT  ACE/ARB ADHERENCE    Per Insurance Records through 10/24/21 (2020 [facility] = n/a%; YTD [facility] = 100%; Potential Fail Date: 11/14/21):   LISINOPRIL   TAB 20MG last filled on 9/27/21 for 30 day supply. Next refill due: 10/27/21    Per Reconciled Dispense Report:  LISINOPRIL   TAB 20MG last filled on 10/25/21 for 90 day supply. Per [facility]:   LISINOPRIL   TAB 20MG last picked up on 10/28/21 for 90 day supply. refills remaining. Billed through [employer]     BP Readings from Last 3 Encounters:   10/25/21 (!) 144/86   09/27/21 (!) 156/83   10/13/20 117/61     Estimated Creatinine Clearance: 110 mL/min (based on SCr of 0.9 mg/dL). [address] [facility]    Per Insurance Records through 10/24/21 (2020 [facility] = n/a%; YTD [employer] = 100%; Passed in 2021   ATORVASTATIN TAB 20MG last filled on 9/28/21 for 90 day supply. Next refill due: 12/27/21      Lab Results   Component Value Date    CHOL 172 11/12/2019    TRIG 139 11/12/2019    HDL 40 09/27/2021    LDLCALC 138 (H) 09/27/2021     ALT   Date Value Ref Range Status   09/27/2021 16 10 - 40 U/L Final     AST   Date Value Ref Range Status   09/27/2021 14 (L) 15 - 37 U/L Final     The 10-year ASCVD risk score ([address] et al., 2013) is: 17.8%    Values used to calculate the score:      Age: 62 years      Sex: Male      Is Non- : Yes      Diabetic: No      Tobacco smoker: No      Systolic Blood Pressure: 517 mmHg      Is BP treated: Yes      HDL Cholesterol: 40 mg/dL      Total Cholesterol: 211 mg/dL     PLAN  The following are interventions that have been identified:       No patient out reach planned at this time.     For Juan Antonio Cristina in place:  No   Recommendation Provided To: Pharmacy: 1   Intervention Detail: Adherence Monitorin   Gap Closed?: No    Intervention Accepted By: Pharmacy: 1   Time Spent (min): 15          Future Appointments   Date Time Provider Óscar Oleary   2021  9:30 AM Vin Emma, PT [facility] [place] [facility]   11/15/2021  8:00 AM Vin Emma, PT [facility] [place] [facility]   2021 11:45 AM Vin Emma, PT [facility] [place] [facility]   2021  8:45 AM Vin Emma, PT [facility] [place] [facility]   2021  8:45 [facility] NANDA Shelton MA.    [facility] free: [phone]

## 2021-11-11 ENCOUNTER — HOSPITAL ENCOUNTER (OUTPATIENT)
Dept: PHYSICAL THERAPY | Age: 61
Setting detail: THERAPIES SERIES
Discharge: HOME | End: 2021-11-11
Payer: MEDICARE

## 2021-11-11 PROCEDURE — 97140 MANUAL THERAPY 1/> REGIONS: CPT

## 2021-11-11 PROCEDURE — 97112 NEUROMUSCULAR REEDUCATION: CPT

## 2021-11-11 PROCEDURE — 97110 THERAPEUTIC EXERCISES: CPT

## 2021-11-11 NOTE — FLOWSHEET NOTE
[place]  Phone: [phone]   Fax:     [phone]    Physical Therapy Treatment Note/ Progress Report:     Date:  2021    Patient Name:  Tracy Paulson    :  1960  MRN: [id number]    Pertinent Medical History:  HTN, hyperlipidemia, depression, back pain    Medical/Treatment Diagnosis Information:  · Diagnosis: M54.2 - Cervicalgia, M50.30 - DDD, cervical  · Treatment Diagnosis: Decreased mobility and pain. Insurance/Certification information:  [employer] Medicare  Physician Information:  Mimi Kahn   Plan of care signed (Y/N):     Date of Patient follow up with Physician:      Progress Report: []  Yes  [x]  No     Date Range for reporting period:  Beginnin2021  Ending:     Progress report due (10 Rx/or 30 days whichever is less): 56    Recertification due (POC duration/ or 90 days whichever is less):     Visit # Insurance/POC Allowable Auth Needed   2 BOMN []Yes    [x]No     Functional Outcomes Measure:    Date Assessed: at eval (21)  Test: NDI  Score: 23 = 46%    Pain level:  8/10     SUBJECTIVE:    21: Pt reports compliance with HEP. He reports some relief with the stretching exercises.      OBJECTIVE:    Observation:    Test measurements:      RESTRICTIONS/PRECAUTIONS: None    Exercises/Interventions:   Therapeutic Ex (44124)  Min: 25 Resistance/Repetitions Notes   LS stretch   UT stretch 3x30 sec, both sides  3x30 sec HEP (not performed on )  UT stretch on L side only   Doorway Pec Str 3x30\"    T-Slide      Rows      Ext Blue  x30  x30    BL ER (t-band) Red, 2x15                        Manual Intervention (67800)  Min: 8     Cerv mobs/manip     Thoracic mobs/manip     CT manip     Rib mobilizations     STM BL cervical paraspinals, suboccipital release         NMR re-education (85291)  Min: 8     Chin Tucks (supine and seated) 2x10 Pt required tactile cues   Scap retractraion 2x10 Pt required tactile cues   Therapeutic Activity (86554)  Min:               Modalities  Min: 8     MHP 8 min           Other Therapeutic Activities:  Pt was educated on PT POC, Diagnosis, Prognosis, pathomechanics as well as frequency and duration of scheduling future physical therapy appointments. Time was also taken on this day to answer all patient questions and participation in PT. Reviewed appointment policy in detail with patient and patient verbalized understanding. Home Exercise Program:    Therapeutic Exercise and NMR EXR  [x] (98524) Provided verbal/tactile cueing for activities related to strengthening, flexibility, endurance, ROM  for improvements in cervical, postural, scapular, scapulothoracic and UE control with self care, reaching, carrying, lifting, house/yardwork, driving/computer work.    [] (10720) Provided verbal/tactile cueing for activities related to improving balance, coordination, kinesthetic sense, posture, motor skill, proprioception  to assist with cervical, scapular, scapulothoracic and UE control with self care, reaching, carrying, lifting, house/yardwork, driving/computer work. Therapeutic Activities:    [] (65896 or 87200) Provided verbal/tactile cueing for activities related to improving balance, coordination, kinesthetic sense, posture, motor skill, proprioception and motor activation to allow for proper function of cervical, scapular, scapulothoracic and UE control with self care, carrying, lifting, driving/computer work.      Home Exercise Program:    [x] (14062) Reviewed/Progressed HEP activities related to strengthening, flexibility, endurance, ROM of cervical, scapular, scapulothoracic and UE control with self care, reaching, carrying, lifting, house/yardwork, driving/computer work  [] (80696) Reviewed/Progressed HEP activities related to improving balance, coordination, kinesthetic sense, posture, motor skill, proprioception of cervical, scapular, scapulothoracic and UE control with self care, reaching, carrying, lifting, house/yardwork, driving/computer work      Manual Treatments:  PROM / STM / Oscillations-Mobs:  G-I, II, III, IV (PA's, Inf., Post.)  [x] (65861) Provided manual therapy to mobilize soft tissue/joints of cervical/CT, scapular GHJ and UE for the purpose of decreasing headache, modulating pain, promoting relaxation,  increasing ROM, reducing/eliminating soft tissue swelling/inflammation/restriction, improving soft tissue extensibility and allowing for proper ROM for normal function with self care, reaching, carrying, lifting, house/yardwork, driving/computer work    If Deshawn Cheek Please Indicate Time In/Out  CPT Code Time in Time out                                   Approval Dates:  CPT Code Units Approved Units Used  Date Updated:                     Charges:  Timed Code Treatment Minutes: 49   Total Treatment Minutes: 50     [] EVAL (LOW) 11989 (typically 20 minutes face-to-face)  [] EVAL (MOD) 67146 (typically 30 minutes face-to-face)  [] EVAL (HIGH) 33693 (typically 45 minutes face-to-face)  [] RE-EVAL     [x] RJ(38193) x   1  [] Dry needle 1 or 2 Muscles (39822)  [x] NMR (32210) x   1  [] Dry needle 3+ Muscles (82263)  [x] Manual (31541) x  1   [] Ultrasound (93068) x  [] TA (42693) x     [] Mech Traction (57831)  [] ES(attended) (87525)     [] ES (un) (33652):   [] Vasopump (13164) [] Ionto (74900)   [] Other:    GOALS:  Patient stated goal: \"To stop the pain. \"  []? Progressing: []? Met: []? Not Met: []? Adjusted     Therapist goals for Patient:   Short Term Goals: To be achieved in: 2 weeks  1. Independent in HEP and progression per patient tolerance, in order to prevent re-injury. []? Progressing: []? Met: []? Not Met: []? Adjusted  2. Patient will have a decrease in pain to facilitate improvement in movement, function, and ADLs as indicated by Functional Deficits. []? Progressing: []? Met: []?  Not Met: []? Adjusted     Long Term Goals: To be achieved in: 6 weeks  1. Disability index score of 35% or less for the NDI to assist with reaching prior level of function. []? Progressing: []? Met: []? Not Met: []? Adjusted  2. Patient will demonstrate increased AROM to [facility] of cervical/thoracic spine to allow for proper joint functioning as indicated by patients Functional Deficits. []? Progressing: []? Met: []? Not Met: []? Adjusted  3. Patient will demonstrate an increase in postural awareness and control and activation of  Deep cervical stabilizers to allow for proper functional mobility as indicated by patients Functional Deficits. []? Progressing: []? Met: []? Not Met: []? Adjusted  4. Patient will return to lifting 20 lbs to chest height without increased symptoms or restriction. []? Progressing: []? Met: []? Not Met: []? Adjusted  5. Pt will be able to look over his left shoulder without an increase in pain/symptoms. []? Progressing: []? Met: []? Not Met: []? Adjusted            ASSESSMENT:  Pt displays good tolerance to PT follow up session. No adverse reaction to today's treatment. Pt requires cues for proper MT/LT activation to decrease over activation of UT. Continue to progress strengthening/postural exercises as tolerated. Treatment/Activity Tolerance:  [x] Patient tolerated treatment well [] Patient limited by fatique  [] Patient limited by pain  [] Patient limited by other medical complications  [] Other:     Overall Progression Towards Functional goals/ Treatment Progress Update:  [] Patient is progressing as expected towards functional goals listed. [] Progression is slowed due to complexities/Impairments listed. [] Progression has been slowed due to co-morbidities.   [x] Plan just implemented, too soon to assess goals progression <30days   [] Goals require adjustment due to lack of progress  [] Patient is not progressing as expected and requires additional follow up with physician  [] Other    Prognosis for POC: [x] Good [] Fair  [] Poor    Patient requires continued skilled intervention: [x] Yes  [] No        PLAN: See eval  [] Continue per plan of care [] Alter current plan (see comments)  [x] Plan of care initiated [] Hold pending MD visit [] Discharge    Electronically signed by: Patsy Mann PT    Note: If patient does not return for scheduled/recommended follow up visits, this note will serve as a discharge from care along with the most recent update on progress.

## 2021-11-15 ENCOUNTER — HOSPITAL ENCOUNTER (OUTPATIENT)
Dept: PHYSICAL THERAPY | Age: 61
Setting detail: THERAPIES SERIES
Discharge: HOME | End: 2021-11-15
Payer: MEDICARE

## 2021-11-15 PROCEDURE — 97112 NEUROMUSCULAR REEDUCATION: CPT

## 2021-11-15 PROCEDURE — 97110 THERAPEUTIC EXERCISES: CPT

## 2021-11-15 PROCEDURE — 97140 MANUAL THERAPY 1/> REGIONS: CPT

## 2021-11-15 NOTE — FLOWSHEET NOTE
[place] [address]  Phone: [phone]   Fax:     [phone]    Physical Therapy Treatment Note/ Progress Report:     Date:  11/15/2021    Patient Name:  Mariam Ruiz    :  1960  MRN: [id number]    Pertinent Medical History:  HTN, hyperlipidemia, depression, back pain    Medical/Treatment Diagnosis Information:  · Diagnosis: M54.2 - Cervicalgia, M50.30 - DDD, cervical  · Treatment Diagnosis: Decreased mobility and pain. Insurance/Certification information:  [employer] Medicare  Physician Information:  Sumeet Lozoya   Plan of care signed (Y/N):     Date of Patient follow up with Physician:      Progress Report: []  Yes  [x]  No     Date Range for reporting period:  Beginnin2021  Ending:     Progress report due (10 Rx/or 30 days whichever is less):     Recertification due (POC duration/ or 90 days whichever is less):     Visit # Insurance/POC Allowable Auth Needed   3 BOMN []Yes    [x]No     Functional Outcomes Measure:    Date Assessed: at eval (21)  Test: NDI  Score: 23 = 46%    Pain level:  7/10     SUBJECTIVE:    21: Pt reports compliance with HEP. He reports some relief with the stretching exercises. 11/15/21: Pt reports, \"I felt pretty good after last PT visit. \" He continues to have some discomfort on the lower L side of his neck. OBJECTIVE:    Observation:    Test measurements:      RESTRICTIONS/PRECAUTIONS: None    Exercises/Interventions:   Therapeutic Ex (19549)  Min: 20 Resistance/Repetitions Notes   LS stretch   UT stretch 3x30 sec, both sides  3x30 sec Hold due to discomfort   Doorway Pec Str 3x30\" Good suraj. T-Slide      Rows      Ext Blue  x30  x30   Good suraj. BL ER (t-band) Red, 2x15 Good suraj.                        Manual Intervention (12710)  Min: 10     Cerv mobs/manip     Thoracic mobs/manip     CT manip     Rib mobilizations     STM BL cervical paraspinals, suboccipital release         NMR re-education (04982)  Min: 10     Chin Tucks (c rotation) 2x10  L and R, good suraj. Scap retractraion 2x10x3 sec Good suraj. Scaption 3#, 2x10 Good suraj. Therapeutic Activity (72241)  Min:               Modalities  Min: 10     MHP 10 min           Other Therapeutic Activities:  Pt was educated on PT POC, Diagnosis, Prognosis, pathomechanics as well as frequency and duration of scheduling future physical therapy appointments. Time was also taken on this day to answer all patient questions and participation in PT. Reviewed appointment policy in detail with patient and patient verbalized understanding. Home Exercise Program:    Therapeutic Exercise and NMR EXR  [x] (57567) Provided verbal/tactile cueing for activities related to strengthening, flexibility, endurance, ROM  for improvements in cervical, postural, scapular, scapulothoracic and UE control with self care, reaching, carrying, lifting, house/yardwork, driving/computer work.    [] (03484) Provided verbal/tactile cueing for activities related to improving balance, coordination, kinesthetic sense, posture, motor skill, proprioception  to assist with cervical, scapular, scapulothoracic and UE control with self care, reaching, carrying, lifting, house/yardwork, driving/computer work. Therapeutic Activities:    [] (26515 or 08945) Provided verbal/tactile cueing for activities related to improving balance, coordination, kinesthetic sense, posture, motor skill, proprioception and motor activation to allow for proper function of cervical, scapular, scapulothoracic and UE control with self care, carrying, lifting, driving/computer work.      Home Exercise Program:    [x] (45691) Reviewed/Progressed HEP activities related to strengthening, flexibility, endurance, ROM of cervical, scapular, scapulothoracic and UE control with self care, reaching, carrying, lifting, house/yardwork, driving/computer work  [] (35083) Reviewed/Progressed HEP activities related to improving balance, coordination, kinesthetic sense, posture, motor skill, proprioception of cervical, scapular, scapulothoracic and UE control with self care, reaching, carrying, lifting, house/yardwork, driving/computer work      Manual Treatments:  PROM / STM / Oscillations-Mobs:  G-I, II, III, IV (PA's, Inf., Post.)  [x] (15937) Provided manual therapy to mobilize soft tissue/joints of cervical/CT, scapular GHJ and UE for the purpose of decreasing headache, modulating pain, promoting relaxation,  increasing ROM, reducing/eliminating soft tissue swelling/inflammation/restriction, improving soft tissue extensibility and allowing for proper ROM for normal function with self care, reaching, carrying, lifting, house/yardwork, driving/computer work    If [facility] Please Indicate Time In/Out  CPT Code Time in Time out                                   Approval Dates:  CPT Code Units Approved Units Used  Date Updated:                     Charges:  Timed Code Treatment Minutes: 40   Total Treatment Minutes: 50     [] EVAL (LOW) 33438 (typically 20 minutes face-to-face)  [] EVAL (MOD) 70424 (typically 30 minutes face-to-face)  [] EVAL (HIGH) 50041 (typically 45 minutes face-to-face)  [] RE-EVAL     [x] JR(53630) x   1  [] Dry needle 1 or 2 Muscles (57229)  [x] NMR (45537) x   1  [] Dry needle 3+ Muscles (32533)  [x] Manual (07129) x  1   [] Ultrasound (08405) x  [] TA (22062) x     [] Mech Traction (35174)  [] ES(attended) (78301)     [] ES (un) ([id number]):   [] Vasopump (53289) [] Ionto (29860)   [] Other:    GOALS:  Patient stated goal: \"To stop the pain. \"  []? Progressing: []? Met: []? Not Met: []? Adjusted     Therapist goals for Patient:   Short Term Goals: To be achieved in: 2 weeks  1. Independent in HEP and progression per patient tolerance, in order to prevent re-injury. []? Progressing: []? Met: []? Not Met: []? Adjusted  2.  Patient will have a decrease in pain to facilitate improvement in movement, function, and ADLs as indicated by Functional Deficits. []? Progressing: []? Met: []? Not Met: []? Adjusted     Long Term Goals: To be achieved in: 6 weeks  1. Disability index score of 35% or less for the NDI to assist with reaching prior level of function. []? Progressing: []? Met: []? Not Met: []? Adjusted  2. Patient will demonstrate increased AROM to [facility] of cervical/thoracic spine to allow for proper joint functioning as indicated by patients Functional Deficits. []? Progressing: []? Met: []? Not Met: []? Adjusted  3. Patient will demonstrate an increase in postural awareness and control and activation of  Deep cervical stabilizers to allow for proper functional mobility as indicated by patients Functional Deficits. []? Progressing: []? Met: []? Not Met: []? Adjusted  4. Patient will return to lifting 20 lbs to chest height without increased symptoms or restriction. []? Progressing: []? Met: []? Not Met: []? Adjusted  5. Pt will be able to look over his left shoulder without an increase in pain/symptoms. []? Progressing: []? Met: []? Not Met: []? Adjusted            ASSESSMENT:  Pt displays good tolerance to PT follow up session. No adverse reaction to today's treatment. Pt presents with increased tightness/myofascial restrictions in L cervical paraspinals > R. Continue to progress strengthening/postural exercises as tolerated. Treatment/Activity Tolerance:  [x] Patient tolerated treatment well [] Patient limited by fatique  [] Patient limited by pain  [] Patient limited by other medical complications  [] Other:     Overall Progression Towards Functional goals/ Treatment Progress Update:  [] Patient is progressing as expected towards functional goals listed. [] Progression is slowed due to complexities/Impairments listed. [] Progression has been slowed due to co-morbidities.   [x] Plan just implemented, too soon to assess goals progression <30days   [] Goals require adjustment due to lack of progress  [] Patient is not progressing as expected and requires additional follow up with physician  [] Other    Prognosis for POC: [x] Good [] Fair  [] Poor    Patient requires continued skilled intervention: [x] Yes  [] No        PLAN: See eval  [] Continue per plan of care [] Alter current plan (see comments)  [x] Plan of care initiated [] Hold pending MD visit [] Discharge    Electronically signed by: Kay Sutton PT    Note: If patient does not return for scheduled/recommended follow up visits, this note will serve as a discharge from care along with the most recent update on progress.

## 2021-11-18 ENCOUNTER — HOSPITAL ENCOUNTER (OUTPATIENT)
Dept: PHYSICAL THERAPY | Age: 61
Setting detail: THERAPIES SERIES
Discharge: HOME | End: 2021-11-18
Payer: MEDICARE

## 2021-11-18 PROCEDURE — 97112 NEUROMUSCULAR REEDUCATION: CPT

## 2021-11-18 PROCEDURE — 97140 MANUAL THERAPY 1/> REGIONS: CPT

## 2021-11-18 PROCEDURE — 97110 THERAPEUTIC EXERCISES: CPT

## 2021-11-18 NOTE — FLOWSHEET NOTE
[facility] [address]  Phone: [phone]   Fax:     [phone]    Physical Therapy Treatment Note/ Progress Report:     Date:  2021    Patient Name:  Roger Anton    :  1960  MRN: [id number]    Pertinent Medical History:  HTN, hyperlipidemia, depression, back pain    Medical/Treatment Diagnosis Information:  · Diagnosis: M54.2 - Cervicalgia, M50.30 - DDD, cervical  · Treatment Diagnosis: Decreased mobility and pain. Insurance/Certification information:  A[employer]na Medicare  Physician Information:  Sam Sherman   Plan of care signed (Y/N):     Date of Patient follow up with Physician:      Progress Report: []  Yes  [x]  No     Date Range for reporting period:  Beginnin2021  Ending:     Progress report due (10 Rx/or 30 days whichever is less):     Recertification due (POC duration/ or 90 days whichever is less):     Visit # Insurance/POC Allowable Auth Needed   4 BOMN []Yes    [x]No     Functional Outcomes Measure:    Date Assessed: at [facility] (21)  Test: NDI  Score: 23 = 46%    Pain level:  7/10     SUBJECTIVE:    21: Pt reports compliance with HEP. He reports some relief with the stretching exercises. 11/15/21: Pt reports, \"I felt pretty good after last PT visit. \" He continues to have some discomfort on the lower L side of his neck. 21: Pt reports the L side of his neck is in pain today. He reports no specific mechanism for the increased pain today. OBJECTIVE:    Observation:    Test measurements:      RESTRICTIONS/PRECAUTIONS: None    Exercises/Interventions:   Therapeutic Ex (94259)  Min: 20 Resistance/Repetitions Notes   LS stretch   UT stretch 3x30 sec  3x30 sec    Doorway Pec Str 3x30\" Good suraj. T-Slide      Rows      Ext      ER      IR   X30, blue  X30, blue  x15, green  x15, green   Good suraj.     Both sides  Both Sides   BL ER (t-band)  Good suraj. Manual Intervention (91344)  Min: 10     Cerv mobs/manip     Thoracic mobs/manip     CT manip     Rib mobilizations     STM BL cervical paraspinals, suboccipital release         NMR re-education (77394)  Min: 10     Chin Tucks (c rotation) 2x10  L and R, good suraj. Scap retractraion 2x10x3 sec Good suraj. Scaption 3#, 2x10 Good suraj. Therapeutic Activity (89227)  Min:               Modalities  Min: 10     MHP 10 min           Other Therapeutic Activities:  Pt was educated on PT POC, Diagnosis, Prognosis, pathomechanics as well as frequency and duration of scheduling future physical therapy appointments. Time was also taken on this day to answer all patient questions and participation in PT. Reviewed appointment policy in detail with patient and patient verbalized understanding. Home Exercise Program:    Therapeutic Exercise and NMR EXR  [x] (70445) Provided verbal/tactile cueing for activities related to strengthening, flexibility, endurance, ROM  for improvements in cervical, postural, scapular, scapulothoracic and UE control with self care, reaching, carrying, lifting, house/yardwork, driving/computer work.    [] (55251) Provided verbal/tactile cueing for activities related to improving balance, coordination, kinesthetic sense, posture, motor skill, proprioception  to assist with cervical, scapular, scapulothoracic and UE control with self care, reaching, carrying, lifting, house/yardwork, driving/computer work. Therapeutic Activities:    [] (47967 or 24008) Provided verbal/tactile cueing for activities related to improving balance, coordination, kinesthetic sense, posture, motor skill, proprioception and motor activation to allow for proper function of cervical, scapular, scapulothoracic and UE control with self care, carrying, lifting, driving/computer work.      Home Exercise Program:    [x] (61636) Reviewed/Progressed HEP activities related to strengthening, flexibility, endurance, ROM of cervical, scapular, scapulothoracic and UE control with self care, reaching, carrying, lifting, house/yardwork, driving/computer work  [] (07942) Reviewed/Progressed HEP activities related to improving balance, coordination, kinesthetic sense, posture, motor skill, proprioception of cervical, scapular, scapulothoracic and UE control with self care, reaching, carrying, lifting, house/yardwork, driving/computer work      Manual Treatments:  PROM / STM / Oscillations-Mobs:  G-I, II, III, IV (PA's, Inf., Post.)  [x] (52421) Provided manual therapy to mobilize soft tissue/joints of cervical/CT, scapular GHJ and UE for the purpose of decreasing headache, modulating pain, promoting relaxation,  increasing ROM, reducing/eliminating soft tissue swelling/inflammation/restriction, improving soft tissue extensibility and allowing for proper ROM for normal function with self care, reaching, carrying, lifting, house/yardwork, driving/computer work    If [facility] Please Indicate Time In/Out  CPT Code Time in Time out                                   Approval Dates:  CPT Code Units Approved Units Used  Date Updated:                     Charges:  Timed Code Treatment Minutes: 40   Total Treatment Minutes: 50     [] EVAL (LOW) 79948 (typically 20 minutes face-to-face)  [] EVAL (MOD) 74098 (typically 30 minutes face-to-face)  [] EVAL (HIGH) 39209 (typically 45 minutes face-to-face)  [] RE-EVAL     [x] XF(85452) x   1  [] Dry needle 1 or 2 Muscles (46159)  [x] NMR (91509) x   1  [] Dry needle 3+ Muscles (35487)  [x] Manual (03993) x  1   [] Ultrasound (94414) x  [] TA (72524) x     [] Mech Traction (48450)  [] ES(attended) (50449)     [] ES (un) (80092):   [] Vasopump (07874) [] Ionto (57728)   [] Other:    GOALS:  Patient stated goal: \"To stop the pain. \"  []? Progressing: []? Met: []? Not Met: []? Adjusted     Therapist goals for Patient:   Short Term Goals: To be achieved in: 2 weeks  1.  Independent in HEP and progression per patient tolerance, in order to prevent re-injury. []? Progressing: []? Met: []? Not Met: []? Adjusted  2. Patient will have a decrease in pain to facilitate improvement in movement, function, and ADLs as indicated by Functional Deficits. []? Progressing: []? Met: []? Not Met: []? Adjusted     Long Term Goals: To be achieved in: 6 weeks  1. Disability index score of 35% or less for the NDI to assist with reaching prior level of function. []? Progressing: []? Met: []? Not Met: []? Adjusted  2. Patient will demonstrate increased AROM to [facility] of cervical/thoracic spine to allow for proper joint functioning as indicated by patients Functional Deficits. []? Progressing: []? Met: []? Not Met: []? Adjusted  3. Patient will demonstrate an increase in postural awareness and control and activation of  Deep cervical stabilizers to allow for proper functional mobility as indicated by patients Functional Deficits. []? Progressing: []? Met: []? Not Met: []? Adjusted  4. Patient will return to lifting 20 lbs to chest height without increased symptoms or restriction. []? Progressing: []? Met: []? Not Met: []? Adjusted  5. Pt will be able to look over his left shoulder without an increase in pain/symptoms. []? Progressing: []? Met: []? Not Met: []? Adjusted            ASSESSMENT:  Pt displays good tolerance to PT follow up session. No adverse reaction to today's treatment. Pt presents with decreased myofascial restrictions in cervical paraspinals, however tightness is still present in L UT. Continue to progress strengthening/postural exercises as tolerated. Treatment/Activity Tolerance:  [x] Patient tolerated treatment well [] Patient limited by fatique  [] Patient limited by pain  [] Patient limited by other medical complications  [] Other:     Overall Progression Towards Functional goals/ Treatment Progress Update:  [] Patient is progressing as expected towards functional goals listed.     [] Progression is slowed due to complexities/Impairments listed. [] Progression has been slowed due to co-morbidities. [x] Plan just implemented, too soon to assess goals progression <30days   [] Goals require adjustment due to lack of progress  [] Patient is not progressing as expected and requires additional follow up with physician  [] Other    Prognosis for POC: [x] Good [] Fair  [] Poor    Patient requires continued skilled intervention: [x] Yes  [] No        PLAN: See eval  [] Continue per plan of care [] Alter current plan (see comments)  [x] Plan of care initiated [] Hold pending MD visit [] Discharge    Electronically signed by: Sandoval Rashid, PT    Note: If patient does not return for scheduled/recommended follow up visits, this note will serve as a discharge from care along with the most recent update on progress.

## 2021-11-22 ENCOUNTER — HOSPITAL ENCOUNTER (OUTPATIENT)
Dept: PHYSICAL THERAPY | Age: 61
Setting detail: THERAPIES SERIES
Discharge: HOME | End: 2021-11-22
Payer: MEDICARE

## 2021-11-22 PROCEDURE — 97110 THERAPEUTIC EXERCISES: CPT

## 2021-11-22 PROCEDURE — 97140 MANUAL THERAPY 1/> REGIONS: CPT

## 2021-11-22 PROCEDURE — 97112 NEUROMUSCULAR REEDUCATION: CPT

## 2021-11-22 NOTE — FLOWSHEET NOTE
[address] [facility] [address]  Phone: [phone]   Fax:     [phone]    Physical Therapy Treatment Note/ Progress Report:     Date:  2021    Patient Name:  Saima Pepper    :  1960  MRN: [id number]    Pertinent Medical History:  HTN, hyperlipidemia, depression, back pain    Medical/Treatment Diagnosis Information:  · Diagnosis: M54.2 - Cervicalgia, M50.30 - DDD, cervical  · Treatment Diagnosis: Decreased mobility and pain. Insurance/Certification information:  A[employer]na Medicare  Physician Information:  Sveta Beckham   Plan of care signed (Y/N):     Date of Patient follow up with Physician:      Progress Report: []  Yes  [x]  No     Date Range for reporting period:  Beginnin2021  Ending:     Progress report due (10 Rx/or 30 days whichever is less):     Recertification due (POC duration/ or 90 days whichever is less):     Visit # Insurance/POC Allowable Auth Needed   5 BOMN []Yes    [x]No     Functional Outcomes Measure:    Date Assessed: at [facility] (21)  Test: NDI  Score: 23 = 46%    Pain level:  7/10     SUBJECTIVE:    21: Pt reports compliance with HEP. He reports some relief with the stretching exercises. 11/15/21: Pt reports, \"I felt pretty good after last PT visit. \" He continues to have some discomfort on the lower L side of his neck. 21: Pt reports the L side of his neck is in pain today. He reports no specific mechanism for the increased pain today. 21: Pt reports, \"My neck is feeling about the same. \" He states he is tired today because he didn't sleep well over the weekend. OBJECTIVE:    Observation:    Test measurements:      RESTRICTIONS/PRECAUTIONS: None    Exercises/Interventions:   Therapeutic Ex (09279)  Min: 25 Resistance/Repetitions Notes   LS stretch   UT stretch     Doorway Pec Str 3x30\" Good suraj.     T-Slide Rows      Ext      ER      IR   X30, blue  X30, blue  x15, green  x15, green   Good suraj. Both sides  Both Sides   BL ER (t-band)     Seated Thoracic Rot x10 ea direction Hand to opposite knee                  Manual Intervention (39538)  Min: 10     Cerv mobs/manip     Thoracic mobs/manip     CT manip     Rib mobilizations     DTM L UT, LS Seated        NMR re-education (52196)  Min: 10     Chin Tucks (c rotation)  L and R, good suraj. Scap retractraion Good suraj. Scaption 3#, 2x10 Good suraj. SA Wall Slides c chin tuck 2x10    Therapeutic Activity (38450)  Min:               Modalities  Min: 10     MHP 10 min           Other Therapeutic Activities:  Pt was educated on PT POC, Diagnosis, Prognosis, pathomechanics as well as frequency and duration of scheduling future physical therapy appointments. Time was also taken on this day to answer all patient questions and participation in PT. Reviewed appointment policy in detail with patient and patient verbalized understanding. Home Exercise Program:    Therapeutic Exercise and NMR EXR  [x] (96029) Provided verbal/tactile cueing for activities related to strengthening, flexibility, endurance, ROM  for improvements in cervical, postural, scapular, scapulothoracic and UE control with self care, reaching, carrying, lifting, house/yardwork, driving/computer work.    [] (07469) Provided verbal/tactile cueing for activities related to improving balance, coordination, kinesthetic sense, posture, motor skill, proprioception  to assist with cervical, scapular, scapulothoracic and UE control with self care, reaching, carrying, lifting, house/yardwork, driving/computer work.     Therapeutic Activities:    [] (13828 or 74731) Provided verbal/tactile cueing for activities related to improving balance, coordination, kinesthetic sense, posture, motor skill, proprioception and motor activation to allow for proper function of cervical, scapular, scapulothoracic and UE control with self care, carrying, lifting, driving/computer work.      Home Exercise Program:    [x] (21072) Reviewed/Progressed HEP activities related to strengthening, flexibility, endurance, ROM of cervical, scapular, scapulothoracic and UE control with self care, reaching, carrying, lifting, house/yardwork, driving/computer work  [] (67164) Reviewed/Progressed HEP activities related to improving balance, coordination, kinesthetic sense, posture, motor skill, proprioception of cervical, scapular, scapulothoracic and UE control with self care, reaching, carrying, lifting, house/yardwork, driving/computer work      Manual Treatments:  PROM / STM / Oscillations-Mobs:  G-I, II, III, IV (PA's, Inf., Post.)  [x] (93219) Provided manual therapy to mobilize soft tissue/joints of cervical/CT, scapular GHJ and UE for the purpose of decreasing headache, modulating pain, promoting relaxation,  increasing ROM, reducing/eliminating soft tissue swelling/inflammation/restriction, improving soft tissue extensibility and allowing for proper ROM for normal function with self care, reaching, carrying, lifting, house/yardwork, driving/computer work    If [facility] Please Indicate Time In/Out  CPT Code Time in Time out                                   Approval Dates:  CPT Code Units Approved Units Used  Date Updated:                     Charges:  Timed Code Treatment Minutes: 45   Total Treatment Minutes: 55     [] EVAL (LOW) 06284 (typically 20 minutes face-to-face)  [] EVAL (MOD) 57072 (typically 30 minutes face-to-face)  [] EVAL (HIGH) 77511 (typically 45 minutes face-to-face)  [] RE-EVAL     [x] VF(52472) x   1  [] Dry needle 1 or 2 Muscles (93142)  [x] NMR (43485) x   1  [] Dry needle 3+ Muscles (32939)  [x] Manual (44755) x  1   [] Ultrasound (29152) x  [] TA (79517) x     [] Mech Traction (13343)  [] ES(attended) (15679)     [] ES (un) (62542):   [] Vasopump (47299) [] Ionto (73618)   [] Other:    GOALS:  Patient stated goal: \"To stop the Treatment/Activity Tolerance:  [x] Patient tolerated treatment well [] Patient limited by fatique  [] Patient limited by pain  [] Patient limited by other medical complications  [] Other:     Overall Progression Towards Functional goals/ Treatment Progress Update:  [] Patient is progressing as expected towards functional goals listed. [] Progression is slowed due to complexities/Impairments listed. [] Progression has been slowed due to co-morbidities. [x] Plan just implemented, too soon to assess goals progression <30days   [] Goals require adjustment due to lack of progress  [] Patient is not progressing as expected and requires additional follow up with physician  [] Other    Prognosis for POC: [x] Good [] Fair  [] Poor    Patient requires continued skilled intervention: [x] Yes  [] No        PLAN: See eval  [] Continue per plan of care [] Alter current plan (see comments)  [x] Plan of care initiated [] Hold pending MD visit [] Discharge    Electronically signed by: Zoraida Ellis PT    Note: If patient does not return for scheduled/recommended follow up visits, this note will serve as a discharge from care along with the most recent update on progress.

## 2021-12-01 ENCOUNTER — HOSPITAL ENCOUNTER (OUTPATIENT)
Dept: PHYSICAL THERAPY | Age: 61
Setting detail: THERAPIES SERIES
Discharge: HOME | End: 2021-12-01
Payer: MEDICARE

## 2021-12-01 NOTE — FLOWSHEET NOTE
[address] [facility] [place] [place] [employer]  Phone: [phone]   Fax:     [phone]    Physical Therapy  Cancellation/No-show Note  Patient Name:  Saima Pepper  :  1960   Date:  2021  Cancelled visits to date: 1  No-shows to date: 0    Patient status for today's appointment patient:  [x]  Cancelled  []  Rescheduled appointment  []  No-show     Reason given by patient:  []  Patient ill  []  Conflicting appointment  [x]  No transportation    []  Conflict with work  []  No reason given  [x]  Other:     Comments: transportation had a flat tire.      Phone call information:   []  Phone call made today to patient at _ time at number provided:      []  Patient answered, conversation as follows:    []  Patient did not answer, message left as follows:  []  Phone call not made today    Electronically signed by:  Queenie Almonte PTA

## 2021-12-03 ENCOUNTER — HOSPITAL ENCOUNTER (OUTPATIENT)
Dept: PHYSICAL THERAPY | Age: 61
Setting detail: THERAPIES SERIES
Discharge: HOME | End: 2021-12-03
Payer: MEDICARE

## 2021-12-03 PROCEDURE — 97140 MANUAL THERAPY 1/> REGIONS: CPT

## 2021-12-03 PROCEDURE — 97112 NEUROMUSCULAR REEDUCATION: CPT

## 2021-12-03 PROCEDURE — 97110 THERAPEUTIC EXERCISES: CPT

## 2021-12-09 ENCOUNTER — ANESTHESIA EVENT (OUTPATIENT)
Dept: ENDOSCOPY | Age: 61
End: 2021-12-09
Payer: MEDICARE

## 2021-12-10 ENCOUNTER — ANESTHESIA (OUTPATIENT)
Dept: ENDOSCOPY | Age: 61
End: 2021-12-10
Payer: MEDICARE

## 2021-12-10 ENCOUNTER — HOSPITAL ENCOUNTER (OUTPATIENT)
Age: 61
Setting detail: OUTPATIENT SURGERY
Discharge: HOME | End: 2021-12-10
Attending: INTERNAL MEDICINE | Admitting: INTERNAL MEDICINE
Payer: MEDICARE

## 2021-12-10 VITALS
OXYGEN SATURATION: 99 % | HEART RATE: 83 BPM | TEMPERATURE: 97.6 F | DIASTOLIC BLOOD PRESSURE: 78 MMHG | HEIGHT: 77 IN | RESPIRATION RATE: 14 BRPM | WEIGHT: 220 LBS | SYSTOLIC BLOOD PRESSURE: 105 MMHG | BODY MASS INDEX: 25.98 KG/M2

## 2021-12-10 VITALS
SYSTOLIC BLOOD PRESSURE: 108 MMHG | RESPIRATION RATE: 11 BRPM | OXYGEN SATURATION: 98 % | DIASTOLIC BLOOD PRESSURE: 60 MMHG

## 2021-12-10 DIAGNOSIS — K62.5 RECTAL BLEEDING: ICD-10-CM

## 2021-12-10 PROCEDURE — 2580000003 HC RX 258: Performed by: NURSE ANESTHETIST, CERTIFIED REGISTERED

## 2021-12-10 PROCEDURE — 3700000000 HC ANESTHESIA ATTENDED CARE: Performed by: INTERNAL MEDICINE

## 2021-12-10 PROCEDURE — 2580000003 HC RX 258: Performed by: ANESTHESIOLOGY

## 2021-12-10 PROCEDURE — 2500000003 HC RX 250 WO HCPCS: Performed by: NURSE ANESTHETIST, CERTIFIED REGISTERED

## 2021-12-10 PROCEDURE — 7100000011 HC PHASE II RECOVERY - ADDTL 15 MIN: Performed by: INTERNAL MEDICINE

## 2021-12-10 PROCEDURE — 88305 TISSUE EXAM BY PATHOLOGIST: CPT

## 2021-12-10 PROCEDURE — 7100000010 HC PHASE II RECOVERY - FIRST 15 MIN: Performed by: INTERNAL MEDICINE

## 2021-12-10 PROCEDURE — 2709999900 HC NON-CHARGEABLE SUPPLY: Performed by: INTERNAL MEDICINE

## 2021-12-10 PROCEDURE — 3700000001 HC ADD 15 MINUTES (ANESTHESIA): Performed by: INTERNAL MEDICINE

## 2021-12-10 PROCEDURE — 3609010600 HC COLONOSCOPY POLYPECTOMY SNARE/COLD BIOPSY: Performed by: INTERNAL MEDICINE

## 2021-12-10 PROCEDURE — 6360000002 HC RX W HCPCS: Performed by: NURSE ANESTHETIST, CERTIFIED REGISTERED

## 2021-12-10 RX ORDER — ONDANSETRON 2 MG/ML
4 INJECTION INTRAMUSCULAR; INTRAVENOUS
Status: DISCONTINUED | OUTPATIENT
Start: 2021-12-10 | End: 2021-12-10 | Stop reason: HOSPADM

## 2021-12-10 RX ORDER — LIDOCAINE HYDROCHLORIDE 20 MG/ML
INJECTION, SOLUTION EPIDURAL; INFILTRATION; INTRACAUDAL; PERINEURAL PRN
Status: DISCONTINUED | OUTPATIENT
Start: 2021-12-10 | End: 2021-12-10 | Stop reason: SDUPTHER

## 2021-12-10 RX ORDER — SODIUM CHLORIDE 9 MG/ML
25 INJECTION, SOLUTION INTRAVENOUS PRN
Status: DISCONTINUED | OUTPATIENT
Start: 2021-12-10 | End: 2021-12-10 | Stop reason: HOSPADM

## 2021-12-10 RX ORDER — SODIUM CHLORIDE 0.9 % (FLUSH) 0.9 %
10 SYRINGE (ML) INJECTION PRN
Status: DISCONTINUED | OUTPATIENT
Start: 2021-12-10 | End: 2021-12-10 | Stop reason: HOSPADM

## 2021-12-10 RX ORDER — SODIUM CHLORIDE 0.9 % (FLUSH) 0.9 %
10 SYRINGE (ML) INJECTION EVERY 12 HOURS SCHEDULED
Status: DISCONTINUED | OUTPATIENT
Start: 2021-12-10 | End: 2021-12-10 | Stop reason: HOSPADM

## 2021-12-10 RX ORDER — 0.9 % SODIUM CHLORIDE 0.9 %
500 INTRAVENOUS SOLUTION INTRAVENOUS
Status: DISCONTINUED | OUTPATIENT
Start: 2021-12-10 | End: 2021-12-10 | Stop reason: HOSPADM

## 2021-12-10 RX ORDER — PROPOFOL 10 MG/ML
INJECTION, EMULSION INTRAVENOUS PRN
Status: DISCONTINUED | OUTPATIENT
Start: 2021-12-10 | End: 2021-12-10 | Stop reason: SDUPTHER

## 2021-12-10 RX ORDER — SODIUM CHLORIDE 9 MG/ML
INJECTION, SOLUTION INTRAVENOUS CONTINUOUS
Status: DISCONTINUED | OUTPATIENT
Start: 2021-12-10 | End: 2021-12-10 | Stop reason: HOSPADM

## 2021-12-10 RX ORDER — SODIUM CHLORIDE, SODIUM LACTATE, POTASSIUM CHLORIDE, CALCIUM CHLORIDE 600; 310; 30; 20 MG/100ML; MG/100ML; MG/100ML; MG/100ML
INJECTION, SOLUTION INTRAVENOUS CONTINUOUS PRN
Status: DISCONTINUED | OUTPATIENT
Start: 2021-12-10 | End: 2021-12-10 | Stop reason: SDUPTHER

## 2021-12-10 RX ORDER — SODIUM CHLORIDE, SODIUM LACTATE, POTASSIUM CHLORIDE, CALCIUM CHLORIDE 600; 310; 30; 20 MG/100ML; MG/100ML; MG/100ML; MG/100ML
INJECTION, SOLUTION INTRAVENOUS CONTINUOUS
Status: DISCONTINUED | OUTPATIENT
Start: 2021-12-10 | End: 2021-12-10 | Stop reason: HOSPADM

## 2021-12-10 RX ADMIN — PROPOFOL 50 MG: 10 INJECTION, EMULSION INTRAVENOUS at 09:35

## 2021-12-10 RX ADMIN — PROPOFOL 50 MG: 10 INJECTION, EMULSION INTRAVENOUS at 09:26

## 2021-12-10 RX ADMIN — SODIUM CHLORIDE, SODIUM LACTATE, POTASSIUM CHLORIDE, AND CALCIUM CHLORIDE: .6; .31; .03; .02 INJECTION, SOLUTION INTRAVENOUS at 09:13

## 2021-12-10 RX ADMIN — LIDOCAINE HYDROCHLORIDE 100 MG: 20 INJECTION, SOLUTION EPIDURAL; INFILTRATION; INTRACAUDAL; PERINEURAL at 09:22

## 2021-12-10 RX ADMIN — PROPOFOL 50 MG: 10 INJECTION, EMULSION INTRAVENOUS at 09:24

## 2021-12-10 RX ADMIN — SODIUM CHLORIDE, POTASSIUM CHLORIDE, SODIUM LACTATE AND CALCIUM CHLORIDE: 600; 310; 30; 20 INJECTION, SOLUTION INTRAVENOUS at 08:47

## 2021-12-10 RX ADMIN — PROPOFOL 50 MG: 10 INJECTION, EMULSION INTRAVENOUS at 09:29

## 2021-12-10 RX ADMIN — PROPOFOL 50 MG: 10 INJECTION, EMULSION INTRAVENOUS at 09:32

## 2021-12-10 RX ADMIN — PROPOFOL 50 MG: 10 INJECTION, EMULSION INTRAVENOUS at 09:22

## 2021-12-10 ASSESSMENT — PULMONARY FUNCTION TESTS
PIF_VALUE: 0

## 2021-12-10 ASSESSMENT — PAIN SCALES - WONG BAKER
WONGBAKER_NUMERICALRESPONSE: 0
WONGBAKER_NUMERICALRESPONSE: 0

## 2021-12-10 ASSESSMENT — PAIN - FUNCTIONAL ASSESSMENT: PAIN_FUNCTIONAL_ASSESSMENT: 0-10

## 2021-12-10 NOTE — ANESTHESIA POSTPROCEDURE EVALUATION
Department of Anesthesiology  Postprocedure Note    Patient: Jonnie Berry  MRN: [id number]  YOB: 1960  Date of evaluation: 12/10/2021  Time:  10:08 AM     Procedure Summary     Date: 12/10/21 Room / Location: [address]    Anesthesia Start: 6608 Anesthesia Stop: 0945    Procedure: COLONOSCOPY POLYPECTOMY SNARE/COLD BIOPSY (N/A ) Diagnosis:       Rectal bleeding      (Rectal bleeding [K62.5])    Surgeons: Josep Montenegro MD Responsible Provider: Wiliam Uribe DO    Anesthesia Type: MAC ASA Status: 2          Anesthesia Type: MAC    Terra Phase I: Terra Score: 10    Terra Phase II: Terra Score: 8    Last vitals: Reviewed and per EMR flowsheets.        Anesthesia Post Evaluation    Patient location during evaluation: PACU  Patient participation: complete - patient participated  Level of consciousness: awake  Pain score: 0  Airway patency: patent  Nausea & Vomiting: no nausea and no vomiting  Complications: no  Cardiovascular status: hemodynamically stable  Respiratory status: acceptable  Hydration status: euvolemic

## 2021-12-10 NOTE — PROGRESS NOTES
Ambulatory Surgery/Procedure Discharge Note    Vitals:    12/10/21 0957   BP: 105/78   Pulse: 83   Resp: 14   Temp: 97.6 °F (36.4 °C)   SpO2: 99%       No intake/output data recorded. Restroom use offered before discharge. Yes  Pt tolerating PO well and denies nausea. Discharge instructions were read at bedside. Pain assessment:  none  Pain Level: 0        Patient discharged to home/self care.  Patient discharged via wheel chair by transporter to waiting family/S.O.       12/10/2021 10:29 AM

## 2021-12-10 NOTE — OP NOTE
[address]                                OPERATIVE REPORT    PATIENT NAME: Adelle Peabody                      :        1960  MED REC NO:   [id number]                          ROOM:  ACCOUNT NO:   [de-identified]                           ADMIT DATE: 12/10/2021  PROVIDER:     Ishmael Hutchinson MD    DATE OF PROCEDURE:  12/10/2021    SURGEON:  Ishmael Hutchinson MD    INDICATION FOR PROCEDURE:  Rectal bleed in a gentleman with a family  history of colon cancer and history of polyps. DESCRIPTION OF PROCEDURE:  With the patient in the left lateral position  and after IV Diprivan, the Olympus video colonoscope was inserted into  the rectum and carefully advanced to cecum. The quality of the  preparation was poor, and for that reason, a significant stool retention  noted that we were unable to completely wash. Small cyst or polyp noted  in the rectum. We removed it with the biopsy forceps. Scope was then  removed, and procedure was terminated without complication. The  visibility was not good due to stool retention. IMPRESSION:  1. Rectal polyp. 2.  Poorly prepared colon. EBL:  None.         Sly Enamorado MD    D: 12/10/2021 9:57:35       T: 12/10/2021 11:02:06     [email or web address]  Job#: [id number]     Doc#: [id number]    CC:  MD Sveta Ronquillo MD

## 2021-12-10 NOTE — H&P
History and Physical / Pre-Sedation Assessment    Patient:  Cassie Mcbride   :   1960     Intended Procedure:  colonoscopy    HPI: recurrent rectal bleed. Fh of colon cancer. Past Medical History:   has a past medical history of Arthropathy, Back pain, Depression, Gout, Hyperlipidemia, Hypertension, and Wears dentures. Past Surgical History:   has a past surgical history that includes Colonoscopy; Endoscopy, colon, diagnostic; lumbar fusion (2017); Arm Surgery (Left, 2020); and Arm Surgery (Right, 10/13/2020). Medications:  Prior to Admission medications    Medication Sig Start Date End Date Taking? Authorizing Provider   allopurinol (ZYLOPRIM) 300 MG tablet Take 1 tablet by mouth daily For arthritis stop allopurinol 100 mg 10/25/21   Johny Matta MD   amLODIPine (NORVASC) 10 MG tablet Take 1 tablet by mouth daily 10/25/21   Johny Matta MD   atorvastatin (LIPITOR) 20 MG tablet Take 1 tablet by mouth daily To prevent heart disease 10/25/21   Johny Matta MD   lisinopril (PRINIVIL;ZESTRIL) 40 MG tablet Take 1 tablet by mouth daily 10/25/21   Johny Matta MD   tiZANidine (ZANAFLEX) 4 MG tablet Take 1 tablet by mouth nightly as needed (prn) Muscle spasm  Neck pain 10/25/21   Johny Matta MD   naproxen (NAPROSYN) 250 MG tablet Take 1 tablet by mouth 2 times daily (with meals) 21   Johny Matta MD       Family History:  family history includes Cancer in his mother; Diabetes in his maternal uncle; Hypertension in his mother. Social History:   reports that he has never smoked. He has never used smokeless tobacco. He reports that he does not drink alcohol and does not use drugs. Allergies:  Patient has no known allergies. ROS:  twelve point system review was unremarkable except for above noted history. Nurses notes reviewed and agreed. Medications reviewed    Physical Exam:  Vital Signs: There were no vitals taken for this visit.    Skin: normal  HEENT: normal  Neck: supple. No adenopathy. No thyromegaly. No JVD. Pulmonary:Normal  Cardiac:Normal  Abdomen:Normal  MS: normal  Neuro: normal  Ext: no edema. Pulses normal    Pre-Procedure Assessment / Plan:  ASA 2 - Patient with mild systemic disease with no functional limitations  Mallampati Airway Assessment:  Mallampati Class II - (soft palate, fauces & uvula are visible)  Level of Sedation Plan: Moderate sedation  Post Procedure plan: Return to same level of care    I assessed the patient and find that the patient is in satisfactory condition to proceed with the planned procedure and sedation plan. I have explained the risk, benefits, and alternatives to the procedure. The patient understands and agrees to proceed.   Yes    Ruth Ann Ruiz MD  7:47 AM 12/10/2021

## 2021-12-10 NOTE — ANESTHESIA PRE PROCEDURE
Department of Anesthesiology  Preprocedure Note       Name:  Dariusz Dennis   Age:  62 y.o.  :  1960                                          MRN:  [id number]         Date:  12/10/2021      Surgeon: Parvez Galindo):  Susana Davidson MD    Procedure: Procedure(s):  COLONOSCOPY    Medications prior to admission:   Prior to Admission medications    Medication Sig Start Date End Date Taking?  Authorizing Provider   allopurinol (ZYLOPRIM) 300 MG tablet Take 1 tablet by mouth daily For arthritis stop allopurinol 100 mg 10/25/21  Yes Johan Montes De Oca MD   amLODIPine (NORVASC) 10 MG tablet Take 1 tablet by mouth daily 10/25/21  Yes Johan Montes De Oca MD   lisinopril (PRINIVIL;ZESTRIL) 40 MG tablet Take 1 tablet by mouth daily 10/25/21  Yes Johan Montes De Oca MD   tiZANidine (ZANAFLEX) 4 MG tablet Take 1 tablet by mouth nightly as needed (prn) Muscle spasm  Neck pain 10/25/21  Yes Johan Montes De Oca MD   naproxen (NAPROSYN) 250 MG tablet Take 1 tablet by mouth 2 times daily (with meals) 21  Yes Johan Montes De Oca MD   atorvastatin (LIPITOR) 20 MG tablet Take 1 tablet by mouth daily To prevent heart disease 10/25/21   Johan Montes De Oca MD       Current medications:    Current Facility-Administered Medications   Medication Dose Route Frequency Provider Last Rate Last Admin    sodium chloride flush 0.9 % injection 10 mL  10 mL IntraVENous 2 times per day Orien Marking, DO        sodium chloride flush 0.9 % injection 10 mL  10 mL IntraVENous PRN Orien Marking, DO        0.9 % sodium chloride infusion  25 mL IntraVENous PRN Orien Marking, DO        0.9 % sodium chloride infusion   IntraVENous Continuous Orien Marking, DO        lactated ringers infusion   IntraVENous Continuous Orien Marking,  mL/hr at 12/10/21 0847 New Bag at 12/10/21 0847    0.9 % sodium chloride bolus  500 mL IntraVENous Once PRN John [place], DO        [facility] injection 4 mg  4 mg IntraVENous Once PRN John [place], DO           Allergies:  No Known Allergies    Problem List:    Patient Active Problem List   Diagnosis Code    Chest pain R07.9    Abnormal stress test R94.39    TISHA positive R76.8    DDD (degenerative disc disease), cervical M50.30    DDD (degenerative disc disease), lumbar M51.36    Depression F32. A    Helicobacter pylori infection A04.8    Hand arthritis M19.049    Hypertension I10    Hypertriglyceridemia E78.1    Left arm weakness R29.898    Left low back pain M54.50    Neck pain on left side M54.2    Right wrist pain M25.531    SS-A antibody positive R76.8    SS-B antibody positive R76.8    Postlaminectomy syndrome, lumbar M96.1    H/O: substance abuse (HCC) F19.11    Chronic pain syndrome G89.4    Synovitis of wrist M65.9       Past Medical History:        Diagnosis Date    Arthritis     neck, back    Arthropathy     TISHA positive    Back pain     Depression     Gout     Hyperlipidemia     Hypertension     Wears dentures        Past Surgical History:        Procedure Laterality Date    ARM SURGERY Left 9/22/2020    LEFT ULNAR NERVE DECOMPRESSION (AT ELBOW) AND LEFT CARPAL TUNNEL RELEASE performed by Isael Schaeffer MD at [address] [place] [address]. Right 10/13/2020    RIGHT ULNAR NERVE DECOMPRESSION AT ELBOW AND RIGHT CARPAL TUNNEL RELEASE performed by Isael Schaeffer MD at [address] [place] [address]      2015    COLONOSCOPY      ENDOSCOPY, COLON, DIAGNOSTIC      LUMBAR FUSION  2017    PLIF L3-S1 done in [facility]       Social History:    Social History     Tobacco Use    Smoking status: Never Smoker    Smokeless tobacco: Never Used   Substance Use Topics    Alcohol use:  No                                Counseling given: Not Answered      Vital Signs (Current):   Vitals:    12/10/21 0806   BP: 104/78   Pulse: 86   Resp: 16   Temp: 97.7 °F (36.5 °C)   TempSrc: Temporal   SpO2: 99%   Weight: 220 lb (99.8 kg)   Height: 6' 5\" (1.956 m)                                              BP Readings from Last 3 Encounters:   12/10/21 104/78   10/25/21 (!) 144/86   09/27/21 (!) 156/83       NPO Status: Time of last liquid consumption: 2200                        Time of last solid consumption: 1600 (cheese coneys - 2-3)                        Date of last liquid consumption: 12/09/21                        Date of last solid food consumption: 12/09/21    BMI:   Wt Readings from Last 3 Encounters:   12/10/21 220 lb (99.8 kg)   10/25/21 228 lb (103.4 kg)   09/27/21 221 lb 4.8 oz (100.4 kg)     Body mass index is 26.09 kg/m². CBC:   Lab Results   Component Value Date    WBC 5.4 09/27/2021    RBC 5.23 09/27/2021    HGB 14.2 09/27/2021    HCT 44.0 09/27/2021    MCV 84.1 09/27/2021    RDW 14.1 09/27/2021     09/27/2021       CMP:   Lab Results   Component Value Date     09/27/2021    K 4.7 09/27/2021     09/27/2021    CO2 25 09/27/2021    BUN 13 09/27/2021    CREATININE 0.9 09/27/2021    GFRAA >60 09/27/2021    AGRATIO 1.3 08/14/2020    LABGLOM >60 09/27/2021    GLUCOSE 90 09/27/2021    PROT 8.0 08/14/2020    CALCIUM 10.0 09/27/2021    BILITOT <0.2 08/14/2020    ALKPHOS 71 08/14/2020    AST 14 09/27/2021    ALT 16 09/27/2021       POC Tests: No results for input(s): POCGLU, POCNA, POCK, POCCL, POCBUN, POCHEMO, POCHCT in the last 72 hours.     Coags:   Lab Results   Component Value Date    PROTIME 10.9 09/16/2015    INR 1.01 09/16/2015    APTT 35.7 09/16/2015       HCG (If Applicable): No results found for: PREGTESTUR, PREGSERUM, HCG, HCGQUANT     ABGs: No results found for: PHART, PO2ART, YJD0CQG, KFO8LZZ, BEART, M4BQEKYT     Type & Screen (If Applicable):  No results found for: LABABO, LABRH    Drug/Infectious Status (If Applicable):  No results found for: HIV, HEPCAB    COVID-19 Screening (If Applicable):   Lab Results   Component Value Date    COVID19 NOT DETECTED 10/07/2020           Anesthesia Evaluation  Patient summary reviewed and Nursing notes reviewed  Airway: Mallampati: II  TM distance: >3 FB Neck ROM: full  Mouth opening: > = 3 FB Dental:    (+) lower dentures and upper dentures      Pulmonary:Negative Pulmonary ROS and normal exam  breath sounds clear to auscultation                             Cardiovascular:  Exercise tolerance: good (>4 METS),   (+) hypertension:,         Rhythm: regular  Rate: normal                    Neuro/Psych:   (+) psychiatric history:            GI/Hepatic/Renal:   (+) bowel prep,           Endo/Other: Negative Endo/Other ROS                    Abdominal:       Abdomen: soft. Vascular: negative vascular ROS. Other Findings:             Anesthesia Plan      MAC     ASA 2       Induction: intravenous. Anesthetic plan and risks discussed with patient. Use of blood products discussed with patient whom consented to blood products. Plan discussed with CRNA.     Attending anesthesiologist reviewed and agrees with Preprocedure content              Jossy Miner DO   12/10/2021

## 2021-12-13 ENCOUNTER — HOSPITAL ENCOUNTER (OUTPATIENT)
Dept: PHYSICAL THERAPY | Age: 61
Setting detail: THERAPIES SERIES
Discharge: HOME | End: 2021-12-13
Payer: MEDICARE

## 2021-12-13 PROCEDURE — 97110 THERAPEUTIC EXERCISES: CPT

## 2021-12-13 PROCEDURE — 97140 MANUAL THERAPY 1/> REGIONS: CPT

## 2021-12-13 PROCEDURE — 97112 NEUROMUSCULAR REEDUCATION: CPT

## 2021-12-13 NOTE — FLOWSHEET NOTE
[place]  Phone: [phone]   Fax:     [phone]    Physical Therapy Treatment Note/ Progress Report:     Date:  2021    Patient Name:  Colton Richard    :  1960  MRN: [id number]    Pertinent Medical History:  HTN, hyperlipidemia, depression, back pain    Medical/Treatment Diagnosis Information:  · Diagnosis: M54.2 - Cervicalgia, M50.30 - DDD, cervical  · Treatment Diagnosis: Decreased mobility and pain. Insurance/Certification information:  [employer] Medicare  Physician Information:  Charmaine Simmons   Plan of care signed (Y/N): sent  (received)    Date of Patient follow up with Physician:      Progress Report: []  Yes  [x]  No     Date Range for reporting period:  Beginnin2021  Ending:     Progress report due (10 Rx/or 30 days whichever is less):     Recertification due (POC duration/ or 90 days whichever is less):     Visit # Insurance/POC Allowable Auth Needed   7 BOMN []Yes    [x]No     Functional Outcomes Measure:    Date Assessed: at [facility] (21)  Test: NDI  Score: 23 = 46%    Pain level:  8/10     SUBJECTIVE:    21: Pt reports compliance with HEP. He reports some relief with the stretching exercises. 11/15/21: Pt reports, \"I felt pretty good after last PT visit. \" He continues to have some discomfort on the lower L side of his neck. 21: Pt reports the L side of his neck is in pain today. He reports no specific mechanism for the increased pain today. 21: Pt reports, \"My neck is feeling about the same. \" He states he is tired today because he didn't sleep well over the weekend. 12/3/21:   21: Pt reports, \"My neck is a little stiff, but doing somewhat better. \"  Notes if he does too much on some days it gets sore.      OBJECTIVE:    Observation:    Test measurements:      RESTRICTIONS/PRECAUTIONS: None    Exercises/Interventions:   Therapeutic Ex (53055)  Min: 10' Resistance/Repetitions Notes   LS stretch   UT stretch     Doorway Pec Str 3x30\" Good suraj. T-Slide      Rows      Ext      ER      IR      Horiz Abd     x15, green  x15, green  2x10, green   Good suraj. Both sides  Both Sides     BL ER (t-band)     Seated Thoracic Rot x10 ea direction Hand to opposite knee                  Manual Intervention (49260)  Min: 20'      Cerv mobs/manip Grade 3 sideglides x5 mins L C5-7    Thoracic mobs/manip Grade 3 upper thoracic PA glides x5 mins    CT manip     Rib mobilizations     DTM L UT, LS x10 mins Seated        NMR re-education (54874)  Min: 10'     Chin Tucks  10x 3 sec holds L and R, good suraj. Scap retractraion Good suraj. Scaption 3#, 2x10 Good suraj. SA Wall Slides w/ chin tuck 2x10    Therapeutic Activity (22437)  Min:               Modalities  Min: 10'     MHP 10 min           Other Therapeutic Activities:  Pt was educated on PT POC, Diagnosis, Prognosis, pathomechanics as well as frequency and duration of scheduling future physical therapy appointments. Time was also taken on this day to answer all patient questions and participation in PT. Reviewed appointment policy in detail with patient and patient verbalized understanding. Home Exercise Program:    Therapeutic Exercise and NMR EXR  [x] (97208) Provided verbal/tactile cueing for activities related to strengthening, flexibility, endurance, ROM  for improvements in cervical, postural, scapular, scapulothoracic and UE control with self care, reaching, carrying, lifting, house/yardwork, driving/computer work.    [] (31218) Provided verbal/tactile cueing for activities related to improving balance, coordination, kinesthetic sense, posture, motor skill, proprioception  to assist with cervical, scapular, scapulothoracic and UE control with self care, reaching, carrying, lifting, house/yardwork, driving/computer work.     Therapeutic Activities: [] (25446 or 25162) Provided verbal/tactile cueing for activities related to improving balance, coordination, kinesthetic sense, posture, motor skill, proprioception and motor activation to allow for proper function of cervical, scapular, scapulothoracic and UE control with self care, carrying, lifting, driving/computer work.      Home Exercise Program:    [x] (35720) Reviewed/Progressed HEP activities related to strengthening, flexibility, endurance, ROM of cervical, scapular, scapulothoracic and UE control with self care, reaching, carrying, lifting, house/yardwork, driving/computer work  [] (50215) Reviewed/Progressed HEP activities related to improving balance, coordination, kinesthetic sense, posture, motor skill, proprioception of cervical, scapular, scapulothoracic and UE control with self care, reaching, carrying, lifting, house/yardwork, driving/computer work      Manual Treatments:  PROM / STM / Oscillations-Mobs:  G-I, II, III, IV (PA's, Inf., Post.)  [x] (65162) Provided manual therapy to mobilize soft tissue/joints of cervical/CT, scapular GHJ and UE for the purpose of decreasing headache, modulating pain, promoting relaxation,  increasing ROM, reducing/eliminating soft tissue swelling/inflammation/restriction, improving soft tissue extensibility and allowing for proper ROM for normal function with self care, reaching, carrying, lifting, house/yardwork, driving/computer work    Charges:  Timed Code Treatment Minutes: 40   Total Treatment Minutes: 50     [] EVAL (LOW) 26907 (typically 20 minutes face-to-face)  [] EVAL (MOD) 02284 (typically 30 minutes face-to-face)  [] EVAL (HIGH) 79660 (typically 45 minutes face-to-face)  [] RE-EVAL     [x] ZT(40963) x   1  [] Dry needle 1 or 2 Muscles (73897)  [x] NMR (16115) x   1  [] Dry needle 3+ Muscles (96549)  [x] Manual (14873) x  1   [] Ultrasound (84403) x  [] TA (43080) x     [] Mech Traction (47599)  [] ES(attended) (61084)     [] ES (un) (53017):   [] Vasopump (80880) [] Ionto (24571)   [] Other:    GOALS:  Patient stated goal: \"To stop the pain. \"  []? Progressing: []? Met: []? Not Met: []? Adjusted     Therapist goals for Patient:   Short Term Goals: To be achieved in: 2 weeks  1. Independent in HEP and progression per patient tolerance, in order to prevent re-injury. []? Progressing: []? Met: []? Not Met: []? Adjusted  2. Patient will have a decrease in pain to facilitate improvement in movement, function, and ADLs as indicated by Functional Deficits. []? Progressing: []? Met: []? Not Met: []? Adjusted     Long Term Goals: To be achieved in: 6 weeks  1. Disability index score of 35% or less for the NDI to assist with reaching prior level of function. []? Progressing: []? Met: []? Not Met: []? Adjusted  2. Patient will demonstrate increased AROM to [facility] of cervical/thoracic spine to allow for proper joint functioning as indicated by patients Functional Deficits. []? Progressing: []? Met: []? Not Met: []? Adjusted  3. Patient will demonstrate an increase in postural awareness and control and activation of  Deep cervical stabilizers to allow for proper functional mobility as indicated by patients Functional Deficits. []? Progressing: []? Met: []? Not Met: []? Adjusted  4. Patient will return to lifting 20 lbs to chest height without increased symptoms or restriction. []? Progressing: []? Met: []? Not Met: []? Adjusted  5. Pt will be able to look over his left shoulder without an increase in pain/symptoms. []? Progressing: []? Met: []? Not Met: []? Adjusted            ASSESSMENT:  Pt displays good tolerance to PT follow up session. Patient reported decreased pain in neck following DTM to UT and Levator and upper thoracic mobilizations. Upper thoracic tightness noted with PA glides.   Able to complete thoracic mobility and cervical stabilization exercises in conjunction with scapular strengthening exercises for postural re-education without increased s/s. Pt presents with decreased myofascial restrictions in cervical paraspinals, however tightness is still present in L UT/LS. Continue to progress strengthening/postural exercises as tolerated. Treatment/Activity Tolerance:  [x] Patient tolerated treatment well [] Patient limited by fatique  [] Patient limited by pain  [] Patient limited by other medical complications  [] Other:     Overall Progression Towards Functional goals/ Treatment Progress Update:  [] Patient is progressing as expected towards functional goals listed. [] Progression is slowed due to complexities/Impairments listed. [] Progression has been slowed due to co-morbidities. [x] Plan just implemented, too soon to assess goals progression <30days   [] Goals require adjustment due to lack of progress  [] Patient is not progressing as expected and requires additional follow up with physician  [] Other    Prognosis for POC: [x] Good [] Fair  [] Poor    Patient requires continued skilled intervention: [x] Yes  [] No        PLAN:   [x] Continue per plan of care [] Alter current plan (see comments)  [] Plan of care initiated [] Hold pending MD visit [] Discharge    Electronically signed by: Lucero Hamm PT , OMT-C,  [id number]      Note: If patient does not return for scheduled/recommended follow up visits, this note will serve as a discharge from care along with the most recent update on progress.

## 2021-12-21 ENCOUNTER — HOSPITAL ENCOUNTER (OUTPATIENT)
Dept: PHYSICAL THERAPY | Age: 61
Setting detail: THERAPIES SERIES
Discharge: HOME | End: 2021-12-21
Payer: MEDICARE

## 2021-12-21 PROCEDURE — 97110 THERAPEUTIC EXERCISES: CPT

## 2021-12-21 PROCEDURE — 97140 MANUAL THERAPY 1/> REGIONS: CPT

## 2021-12-21 PROCEDURE — 97112 NEUROMUSCULAR REEDUCATION: CPT

## 2021-12-21 NOTE — PROGRESS NOTES
[place]  Phone: [phone]   Fax:     [phone]      Physical Therapy Re-Certification Plan of Care/MD UPDATE      Dear Dr. Virgil Vargas,     We have the pleasure of treating the following patient for physical therapy services at [facility]. Patient would benefit from an addition of dry needling to POC to further facilitate pain reduction. Please sign for physician approval. If you have any questions or concerns regarding the addition to the POC, please do not hesitate to contact me at the office phone number checked above. Thank you for the referral.     Physician Signature:________________________________Date:__________________  By signing above (or electronic signature), therapists plan is approved by physician    Date Range Of Visits: 21 - present   Total Visits to Date: 8  Overall Response to Treatment:   []Patient is responding well to treatment and improvement is noted with regards to goals   []Patient should continue to improve in reasonable time if they continue HEP   []Patient has plateaued and is no longer responding to skilled PT intervention    []Patient is getting worse and would benefit from return to referring MD   []Patient unable to adhere to initial POC   [x]Other:  Patient would benefit from an addition of dry needling to POC to further facilitate pain reduction. Recommendation:    [x]Continue PT 2x / wk for 4 weeks.     []Hold PT, pending MD visit        Physical Therapy Treatment Note/ Progress Report:     Date:  2021    Patient Name:  Eli An    :  1960  MRN: [id number]    Pertinent Medical History:  HTN, hyperlipidemia, depression, back pain    Medical/Treatment Diagnosis Information:  · Diagnosis: M54.2 - Cervicalgia, M50.30 - DDD, cervical  · Treatment Diagnosis: Decreased mobility and pain.    Insurance/Certification information:  [employer]  Physician Information:  Charmaine Simmons   Plan of care signed (Y/N): sent  (received)    Date of Patient follow up with Physician:      Progress Report: []  Yes  [x]  No     Date Range for reporting period:  Beginnin21  Ending:     Progress report due (10 Rx/or 30 days whichever is less):      Recertification due (POC duration/ or 90 days whichever is less):     Visit # Insurance/POC Allowable Auth Needed   8 BOMN []Yes    [x]No     Functional Outcomes Measure:    Date Assessed: at eval (21)  Test: NDI  Score: 23 = 46%    Pain level:  7/10     SUBJECTIVE:    21: Pt reports compliance with HEP. He reports some relief with the stretching exercises. 11/15/21: Pt reports, \"I felt pretty good after last PT visit. \" He continues to have some discomfort on the lower L side of his neck. 21: Pt reports the L side of his neck is in pain today. He reports no specific mechanism for the increased pain today. 21: Pt reports, \"My neck is feeling about the same. \" He states he is tired today because he didn't sleep well over the weekend. 12/3/21:   21: Pt reports, \"My neck is a little stiff, but doing somewhat better. \"  Notes if he does too much on some days it gets sore. 21: Pt reports, \"My neck is feeling about the same, same days good, same days bad. \" He has a follow up with his physician in January.      OBJECTIVE:    Observation:    Test measurements:         CERV ROM       Cervical Flexion 75%, pulling     Cervical Extension 50%       Left Right   Cervical SB 50% 50%, pulling    Cervical rotation 75% 75%, pain   UE Strength  Left Right   Shoulder Flex 4/5, pain 4/5   Shoulder Scap 4/5, pain 4/5   Shoulder ABd (C5 Axillary) 4/5, pain 4/5   Shoulder ER  4/5 4/5   Shoulder IR 4/5 4/5   Elbow Flex (C5 Musc) 5/5 5/5   Elbow Ext (C7 Radial) 5/5 5/5   Wrist Flex (C6 Radial) 5/5 5/5   Wrist Ext (C7 Radial) 5/5 5/5   APB (T1 Median) 5/5 5/5                   RESTRICTIONS/PRECAUTIONS: None    Exercises/Interventions:   Therapeutic Ex (45377)  Min: 10' Resistance/Repetitions Notes   LS stretch   UT stretch     Doorway Pec Str 3x30\" Good suraj. T-Slide      Rows      Ext      ER      IR      Horiz Abd     2x15, green  2x15, green  2x10, green   Good suraj. Both sides  Both Sides     BL ER (t-band)     Seated Thoracic Rot x10 ea direction Hand to opposite knee             Manual Intervention (93427)  Min: 20'      Cerv mobs/manip Grade 3 sideglides x5 mins L C5-7    Thoracic mobs/manip Grade 3 upper thoracic PA glides x5 mins    CT manip     Rib mobilizations     DTM L UT, LS x10 mins Seated        NMR re-education (01850)  Min: 10'     Chin Tucks  10x 3 sec holds L and R, good suraj. Scap retractraion Good suraj. Scaption 3#, 2x10 Good suraj. SA Wall Slides w/ chin tuck 2x10    Therapeutic Activity (89447)  Min:               Modalities  Min: 10'     MHP 10 min Cervical           Other Therapeutic Activities:  Pt was educated on PT POC, Diagnosis, Prognosis, pathomechanics as well as frequency and duration of scheduling future physical therapy appointments. Time was also taken on this day to answer all patient questions and participation in PT. Reviewed appointment policy in detail with patient and patient verbalized understanding.      Home Exercise Program:    Therapeutic Exercise and NMR EXR  [x] (61361) Provided verbal/tactile cueing for activities related to strengthening, flexibility, endurance, ROM  for improvements in cervical, postural, scapular, scapulothoracic and UE control with self care, reaching, carrying, lifting, house/yardwork, driving/computer work.    [] (31308) Provided verbal/tactile cueing for activities related to improving balance, coordination, kinesthetic sense, posture, motor skill, proprioception  to assist with cervical, scapular, scapulothoracic and UE control with self care, reaching, [] [facility] Traction (09929)  [] ES(attended) (04309)     [] ES (un) (17003):   [] Vasopump (22250) [] Ionto (93589)   [] Other:    GOALS:  Patient stated goal: \"To stop the pain. \"  [x]? Progressing: []? Met: []? Not Met: []? Adjusted     Therapist goals for Patient:   Short Term Goals: To be achieved in: 2 weeks  1. Independent in HEP and progression per patient tolerance, in order to prevent re-injury. []? Progressing: [x]? Met: []? Not Met: []? Adjusted  2. Patient will have a decrease in pain to facilitate improvement in movement, function, and ADLs as indicated by Functional Deficits. [x]? Progressing: []? Met: []? Not Met: []? Adjusted     Long Term Goals: To be achieved in: 6 weeks  1. Disability index score of 35% or less for the NDI to assist with reaching prior level of function. [x]? Progressing: []? Met: []? Not Met: []? Adjusted  2. Patient will demonstrate increased AROM to [facility] of cervical/thoracic spine to allow for proper joint functioning as indicated by patients Functional Deficits. []? Progressing: [x]? Met: []? Not Met: []? Adjusted  3. Patient will demonstrate an increase in postural awareness and control and activation of  Deep cervical stabilizers to allow for proper functional mobility as indicated by patients Functional Deficits. [x]? Progressing: []? Met: []? Not Met: []? Adjusted  4. Patient will return to lifting 20 lbs to chest height without increased symptoms or restriction. [x]? Progressing: []? Met: []? Not Met: []? Adjusted  5. Pt will be able to look over his left shoulder without an increase in pain/symptoms. []? Progressing: [x]? Met: []? Not Met: []? Adjusted            ASSESSMENT:  Upon RE, pt demonstrates increased AROM and scapular strength improvements. Pt displays good tolerance to PT follow up session. Patient reported decreased pain in neck following DTM to UT and Levator and upper thoracic mobilizations. Upper thoracic tightness noted with PA glides.   Able to complete thoracic mobility and cervical stabilization exercises in conjunction with scapular strengthening exercises for postural re-education without increased s/s. Pt presents with decreased myofascial restrictions in cervical paraspinals, however tightness is still present in L UT/LS. Continue to progress strengthening/postural exercises as tolerated. Pt may benefit from dry needling to L cervical/scapular region. Treatment/Activity Tolerance:  [x] Patient tolerated treatment well [] Patient limited by fatique  [] Patient limited by pain  [] Patient limited by other medical complications  [] Other:     Overall Progression Towards Functional goals/ Treatment Progress Update:  [x] Patient is progressing as expected towards functional goals listed. [] Progression is slowed due to complexities/Impairments listed. [] Progression has been slowed due to co-morbidities. [] Plan just implemented, too soon to assess goals progression <30days   [] Goals require adjustment due to lack of progress  [] Patient is not progressing as expected and requires additional follow up with physician  [] Other    Prognosis for POC: [x] Good [] Fair  [] Poor    Patient requires continued skilled intervention: [x] Yes  [] No        PLAN:   [x] Continue per plan of care [] Alter current plan (see comments)  [] Plan of care initiated [] Hold pending MD visit [] Discharge    Electronically signed by: Velma Muniz PT       Note: If patient does not return for scheduled/recommended follow up visits, this note will serve as a discharge from care along with the most recent update on progress.

## 2022-01-03 ENCOUNTER — OFFICE VISIT (OUTPATIENT)
Dept: PRIMARY CARE CLINIC | Age: 62
End: 2022-01-03
Payer: MEDICARE

## 2022-01-03 VITALS
HEIGHT: 77 IN | WEIGHT: 224 LBS | DIASTOLIC BLOOD PRESSURE: 86 MMHG | TEMPERATURE: 97.7 F | SYSTOLIC BLOOD PRESSURE: 129 MMHG | HEART RATE: 80 BPM | OXYGEN SATURATION: 96 % | BODY MASS INDEX: 26.45 KG/M2

## 2022-01-03 DIAGNOSIS — M54.12 CERVICAL RADICULOPATHY: Primary | ICD-10-CM

## 2022-01-03 DIAGNOSIS — M54.2 CERVICALGIA: ICD-10-CM

## 2022-01-03 PROCEDURE — 99214 OFFICE O/P EST MOD 30 MIN: CPT | Performed by: FAMILY MEDICINE

## 2022-01-03 RX ORDER — METHYLPREDNISOLONE 4 MG/1
TABLET ORAL
Qty: 21 TABLET | Refills: 0 | Status: SHIPPED | OUTPATIENT
Start: 2022-01-03 | End: 2022-01-09

## 2022-01-03 RX ORDER — OMEPRAZOLE 20 MG/1
20 CAPSULE, DELAYED RELEASE ORAL
Qty: 90 CAPSULE | Refills: 1 | Status: ON HOLD | OUTPATIENT
Start: 2022-01-03 | End: 2022-01-12 | Stop reason: ALTCHOICE

## 2022-01-03 RX ORDER — NAPROXEN 250 MG/1
250 TABLET ORAL 2 TIMES DAILY WITH MEALS
Qty: 180 TABLET | Refills: 1 | Status: ON HOLD | OUTPATIENT
Start: 2022-01-03 | End: 2022-01-12

## 2022-01-03 NOTE — PROGRESS NOTES
Fu for neck     HPI  63 y/o male known hypertension  And previous h/o bilat CTS treated medically. On last visit he  Was having pain and numbness left lateral neck and upper arm  Area which persists . He has had PT  Not much improvement in his sxs. He has taken insaids    Low dose due to some gi irritation  Not currently present    He has had some borderline glucose readings      Physical Exam  Constitutional:       General: He is not in acute distress. Appearance: He is well-developed. He is not diaphoretic. HENT:      Head: Normocephalic and atraumatic. Right Ear: External ear normal.      Left Ear: External ear normal.      Nose: Nose normal.      Mouth/Throat:      Pharynx: No oropharyngeal exudate. Eyes:      General: No scleral icterus. Right eye: No discharge. Left eye: No discharge. Conjunctiva/sclera: Conjunctivae normal.      Pupils: Pupils are equal, round, and reactive to light. Neck:      Thyroid: No thyromegaly. Vascular: No JVD. Trachea: No tracheal deviation. Cardiovascular:      Rate and Rhythm: Normal rate and regular rhythm. Pulses:           Carotid pulses are 2+ on the right side and 2+ on the left side. Radial pulses are 2+ on the right side and 2+ on the left side. Femoral pulses are 2+ on the right side and 2+ on the left side. Popliteal pulses are 2+ on the right side and 2+ on the left side. Dorsalis pedis pulses are 2+ on the right side and 2+ on the left side. Posterior tibial pulses are 2+ on the right side and 2+ on the left side. Heart sounds: Normal heart sounds. No murmur heard. No friction rub. Pulmonary:      Effort: Pulmonary effort is normal. No respiratory distress. Breath sounds: Normal breath sounds. No stridor. No wheezing or rales. Chest:      Chest wall: No tenderness. Abdominal:      General: Bowel sounds are normal. There is no distension.       Palpations: Abdomen is soft. There is no mass. Tenderness: There is no abdominal tenderness. There is no guarding or rebound. Musculoskeletal:         General: No tenderness. Normal range of motion. Cervical back: Normal range of motion and neck supple. Lymphadenopathy:      Cervical: No cervical adenopathy. Skin:     General: Skin is warm and dry. Coloration: Skin is not pale. Findings: No rash. Neurological:      Mental Status: He is oriented to person, place, and time. Cranial Nerves: No cranial nerve deficit. Motor: No abnormal muscle tone. Coordination: Coordination normal.      Deep Tendon Reflexes: Reflexes normal.   Psychiatric:         Behavior: Behavior normal.         Thought Content: Thought content normal.         Judgment: Judgment normal.         1. Cervical radiculopathy  sxs are worse  X rays show spondylosis   sxs consistent with radiculopathy  He has had previous dx of bilat CTS also  Which is better    - MRI CERVICAL SPINE WO CONTRAST; Future  - naproxen (NAPROSYN) 250 MG tablet; Take 1 tablet by mouth 2 times daily (with meals)  Dispense: 180 tablet; Refill: 1  - methylPREDNISolone (MEDROL DOSEPACK) 4 MG tablet; Take by mouth 6 tabs day one then one less tab each day until all gone  Dispense: 21 tablet; Refill: 0  - omeprazole (PRILOSEC) 20 MG delayed release capsule; Take 1 capsule by mouth every morning (before breakfast)  Dispense: 90 capsule; Refill: 1    2. Cervicalgia  See one above  - naproxen (NAPROSYN) 250 MG tablet; Take 1 tablet by mouth 2 times daily (with meals)  Dispense: 180 tablet; Refill: 1  - methylPREDNISolone (MEDROL DOSEPACK) 4 MG tablet; Take by mouth 6 tabs day one then one less tab each day until all gone  Dispense: 21 tablet; Refill: 0  - omeprazole (PRILOSEC) 20 MG delayed release capsule; Take 1 capsule by mouth every morning (before breakfast)  Dispense: 90 capsule;  Refill: 1

## 2022-01-05 ENCOUNTER — TELEPHONE (OUTPATIENT)
Dept: PRIMARY CARE CLINIC | Age: 62
End: 2022-01-05

## 2022-01-05 ENCOUNTER — HOSPITAL ENCOUNTER (OUTPATIENT)
Dept: PHYSICAL THERAPY | Age: 62
Setting detail: THERAPIES SERIES
Discharge: HOME | End: 2022-01-05
Payer: MEDICARE

## 2022-01-05 PROCEDURE — 97112 NEUROMUSCULAR REEDUCATION: CPT

## 2022-01-05 PROCEDURE — 97140 MANUAL THERAPY 1/> REGIONS: CPT

## 2022-01-05 PROCEDURE — 97110 THERAPEUTIC EXERCISES: CPT

## 2022-01-05 NOTE — PROGRESS NOTES
[facility] [place]. [address]  Phone: [phone]   Fax:     [phone]      Physical Therapy Re-Certification Plan of Care/MD UPDATE      Dear Dr. Tanner Chacko,     We have the pleasure of treating the following patient for physical therapy services at [facility]. Patient would benefit from an addition of dry needling to POC to further facilitate pain reduction. Please sign for physician approval. If you have any questions or concerns regarding the addition to the POC, please do not hesitate to contact me at the office phone number checked above. Thank you for the referral.     Physician Signature:________________________________Date:__________________  By signing above (or electronic signature), therapists plan is approved by physician    Date Range Of Visits: 21 - present   Total Visits to Date: 8  Overall Response to Treatment:   []Patient is responding well to treatment and improvement is noted with regards to goals   []Patient should continue to improve in reasonable time if they continue HEP   []Patient has plateaued and is no longer responding to skilled PT intervention    []Patient is getting worse and would benefit from return to referring MD   []Patient unable to adhere to initial POC   [x]Other:  Patient would benefit from an addition of dry needling to POC to further facilitate pain reduction. Recommendation:    [x]Continue PT 2x / wk for 4 weeks.     []Hold PT, pending MD visit        Physical Therapy Treatment Note/ Progress Report:     Date:  2022    Patient Name:  Freddy Rodriguez    :  1960  MRN: [id number]    Pertinent Medical History:  HTN, hyperlipidemia, depression, back pain    Medical/Treatment Diagnosis Information:  · Diagnosis: M54.2 - Cervicalgia, M50.30 - DDD, cervical  · Treatment Diagnosis: Decreased mobility and Elbow Ext (C7 Radial) 5/5 5/5   Wrist Flex (C6 Radial) 5/5 5/5   Wrist Ext (C7 Radial) 5/5 5/5   APB (T1 Median) 5/5 5/5                   RESTRICTIONS/PRECAUTIONS: None    Exercises/Interventions:   Therapeutic Ex (66639)  Min: 10' Resistance/Repetitions Notes   LS stretch   UT stretch     Doorway Pec Str 3x30\" Good suraj. T-Slide      Rows      Ext      ER      IR      Horiz Abd     2x15, green  2x15, green  2x10, green   Good suraj. Both sides  Both Sides     BL ER (t-band) Red, 2x15 fatigues   Seated Thoracic Rot x10 ea direction Hand to opposite knee             Manual Intervention (84219)  Min: 20'      Cerv mobs/manip Grade 3 sideglides x5 mins L C5-7    Thoracic mobs/manip Grade 3 upper thoracic PA glides x5 mins    CT manip     Rib mobilizations     DTM L UT, LS x10 mins Seated        NMR re-education (50255)  Min: 10'     Chin Tucks  10x 3 sec holds L and R, good suraj. Scap retractraion Good suraj. Scaption 3#, 2x10 Good suraj. SA Wall Slides w/ chin tuck 2x10    Therapeutic Activity (24155)  Min:               Modalities  Min: 10'     MHP 10 min Cervical           Other Therapeutic Activities:  Pt was educated on PT POC, Diagnosis, Prognosis, pathomechanics as well as frequency and duration of scheduling future physical therapy appointments. Time was also taken on this day to answer all patient questions and participation in PT. Reviewed appointment policy in detail with patient and patient verbalized understanding.      Home Exercise Program:    Therapeutic Exercise and NMR EXR  [x] (17744) Provided verbal/tactile cueing for activities related to strengthening, flexibility, endurance, ROM  for improvements in cervical, postural, scapular, scapulothoracic and UE control with self care, reaching, carrying, lifting, house/yardwork, driving/computer work.    [] (60373) Provided verbal/tactile cueing for activities related to improving balance, coordination, kinesthetic sense, posture, motor skill, proprioception  to assist with cervical, scapular, scapulothoracic and UE control with self care, reaching, carrying, lifting, house/yardwork, driving/computer work. Therapeutic Activities:    [] (71186 or 13032) Provided verbal/tactile cueing for activities related to improving balance, coordination, kinesthetic sense, posture, motor skill, proprioception and motor activation to allow for proper function of cervical, scapular, scapulothoracic and UE control with self care, carrying, lifting, driving/computer work.      Home Exercise Program:    [x] (16869) Reviewed/Progressed HEP activities related to strengthening, flexibility, endurance, ROM of cervical, scapular, scapulothoracic and UE control with self care, reaching, carrying, lifting, house/yardwork, driving/computer work  [] (33969) Reviewed/Progressed HEP activities related to improving balance, coordination, kinesthetic sense, posture, motor skill, proprioception of cervical, scapular, scapulothoracic and UE control with self care, reaching, carrying, lifting, house/yardwork, driving/computer work      Manual Treatments:  PROM / STM / Oscillations-Mobs:  G-I, II, III, IV (PA's, Inf., Post.)  [x] (13891) Provided manual therapy to mobilize soft tissue/joints of cervical/CT, scapular GHJ and UE for the purpose of decreasing headache, modulating pain, promoting relaxation,  increasing ROM, reducing/eliminating soft tissue swelling/inflammation/restriction, improving soft tissue extensibility and allowing for proper ROM for normal function with self care, reaching, carrying, lifting, house/yardwork, driving/computer work    Charges:  Timed Code Treatment Minutes: 40   Total Treatment Minutes: 50     [] EVAL (LOW) 79733 (typically 20 minutes face-to-face)  [] EVAL (MOD) 72958 (typically 30 minutes face-to-face)  [] EVAL (HIGH) 97200 (typically 45 minutes face-to-face)  [] RE-EVAL     [x] QM(45276) x   1  [] Dry needle 1 or 2 Muscles (77263)  [x] NMR (24355) x 1  [] Dry needle 3+ Muscles (16642)  [x] Manual (37288) x  1   [] Ultrasound (96913) x  [] TA (66154) x     [] Mech Traction (39273)  [] ES(attended) (29712)     [] ES (un) (30239):   [] Vasopump (82097) [] Ionto (88004)   [] Other:    GOALS:  Patient stated goal: \"To stop the pain. \"  [x]? Progressing: []? Met: []? Not Met: []? Adjusted     Therapist goals for Patient:   Short Term Goals: To be achieved in: 2 weeks  1. Independent in HEP and progression per patient tolerance, in order to prevent re-injury. []? Progressing: [x]? Met: []? Not Met: []? Adjusted  2. Patient will have a decrease in pain to facilitate improvement in movement, function, and ADLs as indicated by Functional Deficits. [x]? Progressing: []? Met: []? Not Met: []? Adjusted     Long Term Goals: To be achieved in: 6 weeks  1. Disability index score of 35% or less for the NDI to assist with reaching prior level of function. [x]? Progressing: []? Met: []? Not Met: []? Adjusted  2. Patient will demonstrate increased AROM to [facility] of cervical/thoracic spine to allow for proper joint functioning as indicated by patients Functional Deficits. []? Progressing: [x]? Met: []? Not Met: []? Adjusted  3. Patient will demonstrate an increase in postural awareness and control and activation of  Deep cervical stabilizers to allow for proper functional mobility as indicated by patients Functional Deficits. [x]? Progressing: []? Met: []? Not Met: []? Adjusted  4. Patient will return to lifting 20 lbs to chest height without increased symptoms or restriction. [x]? Progressing: []? Met: []? Not Met: []? Adjusted  5. Pt will be able to look over his left shoulder without an increase in pain/symptoms. []? Progressing: [x]? Met: []? Not Met: []? Adjusted            ASSESSMENT:  Tolerated treatment well, reported decreased stiffness in thoracic area after rx.     Treatment/Activity Tolerance:  [x] Patient tolerated treatment well [] Patient limited by galo  [] Patient limited by pain  [] Patient limited by other medical complications  [] Other:     Overall Progression Towards Functional goals/ Treatment Progress Update:  [x] Patient is progressing as expected towards functional goals listed. [] Progression is slowed due to complexities/Impairments listed. [] Progression has been slowed due to co-morbidities. [] Plan just implemented, too soon to assess goals progression <30days   [] Goals require adjustment due to lack of progress  [] Patient is not progressing as expected and requires additional follow up with physician  [] Other    Prognosis for POC: [x] Good [] Fair  [] Poor    Patient requires continued skilled intervention: [x] Yes  [] No        PLAN:   [x] Continue per plan of care [] Alter current plan (see comments)  [] Plan of care initiated [] Hold pending MD visit [] Discharge    Electronically signed by: Mansi Waite, PTA [id number]      Note: If patient does not return for scheduled/recommended follow up visits, this note will serve as a discharge from care along with the most recent update on progress.

## 2022-01-07 ENCOUNTER — HOSPITAL ENCOUNTER (OUTPATIENT)
Dept: PHYSICAL THERAPY | Age: 62
Setting detail: THERAPIES SERIES
Discharge: HOME | End: 2022-01-07
Payer: MEDICARE

## 2022-01-07 PROCEDURE — 97140 MANUAL THERAPY 1/> REGIONS: CPT

## 2022-01-07 PROCEDURE — 97112 NEUROMUSCULAR REEDUCATION: CPT

## 2022-01-07 PROCEDURE — 97110 THERAPEUTIC EXERCISES: CPT

## 2022-01-07 NOTE — FLOWSHEET NOTE
[place]  Phone: [phone]   Fax:     [phone]    Physical Therapy Treatment Note/ Progress Report:     Date:  2022    Patient Name:  Edgar Patton    :  1960  MRN: [id number]    Pertinent Medical History:  HTN, hyperlipidemia, depression, back pain    Medical/Treatment Diagnosis Information:  · Diagnosis: M54.2 - Cervicalgia, M50.30 - DDD, cervical  · Treatment Diagnosis: Decreased mobility and pain. Insurance/Certification information:  [employer] Medicare  Physician Information:  Prieto Carias   Plan of care signed (Y/N): sent  (received) - Progress note from 21 signed for approval of dry needling     Date of Patient follow up with Physician:      Progress Report: []  Yes  [x]  No     Date Range for reporting period:  Beginnin21  Ending:     Progress report due (10 Rx/or 30 days whichever is less):      Recertification due (POC duration/ or 90 days whichever is less):     Visit # Insurance/POC Allowable Auth Needed   10 BOMN []Yes    [x]No     Functional Outcomes Measure:    Date Assessed: at al (21)  Test: NDI  Score: 23 = 46%    Pain level:  8/10     SUBJECTIVE:    21: Pt reports compliance with HEP. He reports some relief with the stretching exercises. 11/15/21: Pt reports, \"I felt pretty good after last PT visit. \" He continues to have some discomfort on the lower L side of his neck. 21: Pt reports the L side of his neck is in pain today. He reports no specific mechanism for the increased pain today. 21: Pt reports, \"My neck is feeling about the same. \" He states he is tired today because he didn't sleep well over the weekend. 12/3/21:   21: Pt reports, \"My neck is a little stiff, but doing somewhat better. \"  Notes if he does too much on some days it gets sore.    21: Pt reports, \"My neck is feeling about the same, same days good, same days bad. \" He has a follow up with his physician in January. 01/05/21: Patient reports neck is about the same. States he is having MRI done on 01/08/21.   1/7/22: Pt reports, \"Things are feeling sore. I get an MRI tomorrow on my neck. \"      OBJECTIVE:    Observation:    Test measurements:         CERV ROM       Cervical Flexion 75%, pulling     Cervical Extension 50%       Left Right   Cervical SB 50% 50%, pulling    Cervical rotation 75% 75%, pain   UE Strength  Left Right   Shoulder Flex 4/5, pain 4/5   Shoulder Scap 4/5, pain 4/5   Shoulder ABd (C5 Axillary) 4/5, pain 4/5   Shoulder ER  4/5 4/5   Shoulder IR 4/5 4/5   Elbow Flex (C5 Musc) 5/5 5/5   Elbow Ext (C7 Radial) 5/5 5/5   Wrist Flex (C6 Radial) 5/5 5/5   Wrist Ext (C7 Radial) 5/5 5/5   APB (T1 Median) 5/5 5/5                   RESTRICTIONS/PRECAUTIONS: None    Exercises/Interventions:   Therapeutic Ex (72885)  Min: 10' Resistance/Repetitions Notes   LS stretch   UT stretch     Doorway Pec Str 3x30\" Good suraj. T-Slide      Rows      Ext      ER      IR      Horiz Abd     2x15, green  2x15, green  2x10, green   Good suraj. Both sides  Both Sides     BL ER (t-band) Red, 2x15 fatigues   Seated Thoracic Rot x10 ea direction Hand to opposite knee             Manual Intervention (52139)  Min: 20'      Cerv mobs/manip Grade 3 sideglides x5 mins L C5-7    Thoracic mobs/manip Grade 3 upper thoracic PA glides x5 mins    CT manip     Rib mobilizations     DTM L UT, LS x10 mins Seated        NMR re-education (08232)  Min: 10'     Chin Tucks  10x 3 sec holds L and R, good suraj. Scap retractraion Good suraj. Scaption 3#, 2x10 Good suraj.    SA Wall Slides w/ chin tuck 2x10    Therapeutic Activity (97491)  Min:               Modalities  Min: 10'     MHP 10 min Cervical           Other Therapeutic Activities:  Pt was educated on PT POC, Diagnosis, Prognosis, pathomechanics as well as frequency and duration of scheduling future physical therapy appointments. Time was also taken on this day to answer all patient questions and participation in PT. Reviewed appointment policy in detail with patient and patient verbalized understanding. Home Exercise Program:    Therapeutic Exercise and NMR EXR  [x] (52009) Provided verbal/tactile cueing for activities related to strengthening, flexibility, endurance, ROM  for improvements in cervical, postural, scapular, scapulothoracic and UE control with self care, reaching, carrying, lifting, house/yardwork, driving/computer work. [x] (46217) Provided verbal/tactile cueing for activities related to improving balance, coordination, kinesthetic sense, posture, motor skill, proprioception  to assist with cervical, scapular, scapulothoracic and UE control with self care, reaching, carrying, lifting, house/yardwork, driving/computer work. Therapeutic Activities:    [] (92685 or 70148) Provided verbal/tactile cueing for activities related to improving balance, coordination, kinesthetic sense, posture, motor skill, proprioception and motor activation to allow for proper function of cervical, scapular, scapulothoracic and UE control with self care, carrying, lifting, driving/computer work.      Home Exercise Program:    [x] (37283) Reviewed/Progressed HEP activities related to strengthening, flexibility, endurance, ROM of cervical, scapular, scapulothoracic and UE control with self care, reaching, carrying, lifting, house/yardwork, driving/computer work  [] (49858) Reviewed/Progressed HEP activities related to improving balance, coordination, kinesthetic sense, posture, motor skill, proprioception of cervical, scapular, scapulothoracic and UE control with self care, reaching, carrying, lifting, house/yardwork, driving/computer work      Manual Treatments:  PROM / STM / Oscillations-Mobs:  G-I, II, III, IV (PA's, Inf., Post.)  [x] (33973) Provided manual therapy to mobilize soft tissue/joints of cervical/CT, scapular GHJ and UE for the purpose of decreasing headache, modulating pain, promoting relaxation,  increasing ROM, reducing/eliminating soft tissue swelling/inflammation/restriction, improving soft tissue extensibility and allowing for proper ROM for normal function with self care, reaching, carrying, lifting, house/yardwork, driving/computer work    Charges:  Timed Code Treatment Minutes: 40   Total Treatment Minutes: 50     [] EVAL (LOW) 73625 (typically 20 minutes face-to-face)  [] EVAL (MOD) 31591 (typically 30 minutes face-to-face)  [] EVAL (HIGH) 73460 (typically 45 minutes face-to-face)  [] RE-EVAL     [x] XV(05342) x   1  [] Dry needle 1 or 2 Muscles (98838)  [x] NMR (73749) x   1  [] Dry needle 3+ Muscles (20083)  [x] Manual (23983) x  1   [] Ultrasound (54670) x  [] TA (61768) x     [] Mech Traction (90794)  [] ES(attended) (88039)     [] ES (un) (89962):   [] Vasopump (86345) [] Ionto (61474)   [] Other:    GOALS:  Patient stated goal: \"To stop the pain. \"  [x]? Progressing: []? Met: []? Not Met: []? Adjusted     Therapist goals for Patient:   Short Term Goals: To be achieved in: 2 weeks  1. Independent in HEP and progression per patient tolerance, in order to prevent re-injury. []? Progressing: [x]? Met: []? Not Met: []? Adjusted  2. Patient will have a decrease in pain to facilitate improvement in movement, function, and ADLs as indicated by Functional Deficits. [x]? Progressing: []? Met: []? Not Met: []? Adjusted     Long Term Goals: To be achieved in: 6 weeks  1. Disability index score of 35% or less for the NDI to assist with reaching prior level of function. [x]? Progressing: []? Met: []? Not Met: []? Adjusted  2. Patient will demonstrate increased AROM to [facility] of cervical/thoracic spine to allow for proper joint functioning as indicated by patients Functional Deficits. []? Progressing: [x]? Met: []? Not Met: []? Adjusted  3.  Patient will demonstrate an increase in postural awareness and control and activation of  Deep cervical stabilizers to allow for proper functional mobility as indicated by patients Functional Deficits. [x]? Progressing: []? Met: []? Not Met: []? Adjusted  4. Patient will return to lifting 20 lbs to chest height without increased symptoms or restriction. [x]? Progressing: []? Met: []? Not Met: []? Adjusted  5. Pt will be able to look over his left shoulder without an increase in pain/symptoms. []? Progressing: [x]? Met: []? Not Met: []? Adjusted            ASSESSMENT:  Tolerated treatment well, reported decreased stiffness in thoracic area after rx. L upper trap tightness still evident. Treatment/Activity Tolerance:  [x] Patient tolerated treatment well [] Patient limited by fatique  [] Patient limited by pain  [] Patient limited by other medical complications  [] Other:     Overall Progression Towards Functional goals/ Treatment Progress Update:  [x] Patient is progressing as expected towards functional goals listed. [] Progression is slowed due to complexities/Impairments listed. [] Progression has been slowed due to co-morbidities. [] Plan just implemented, too soon to assess goals progression <30days   [] Goals require adjustment due to lack of progress  [] Patient is not progressing as expected and requires additional follow up with physician  [] Other    Prognosis for POC: [x] Good [] Fair  [] Poor    Patient requires continued skilled intervention: [x] Yes  [] No        PLAN:   [x] Continue per plan of care [] Alter current plan (see comments)  [] Plan of care initiated [] Hold pending MD visit [] Discharge    Electronically signed by: Amanda Newberry PT       Note: If patient does not return for scheduled/recommended follow up visits, this note will serve as a discharge from care along with the most recent update on progress.

## 2022-01-08 ENCOUNTER — HOSPITAL ENCOUNTER (OUTPATIENT)
Dept: MRI IMAGING | Age: 62
Discharge: HOME | End: 2022-01-08
Payer: MEDICARE

## 2022-01-08 DIAGNOSIS — M54.12 CERVICAL RADICULOPATHY: ICD-10-CM

## 2022-01-08 PROCEDURE — 72141 MRI NECK SPINE W/O DYE: CPT

## 2022-01-10 DIAGNOSIS — M54.12 CERVICAL RADICULOPATHY: ICD-10-CM

## 2022-01-10 DIAGNOSIS — M50.90 CERVICAL NECK PAIN WITH EVIDENCE OF DISC DISEASE: ICD-10-CM

## 2022-01-10 DIAGNOSIS — M48.02 CERVICAL SPINAL STENOSIS: Primary | ICD-10-CM

## 2022-01-11 ENCOUNTER — HOSPITAL ENCOUNTER (OUTPATIENT)
Dept: PHYSICAL THERAPY | Age: 62
Setting detail: THERAPIES SERIES
Discharge: HOME | End: 2022-01-11
Payer: MEDICARE

## 2022-01-11 NOTE — FLOWSHEET NOTE
[facility] [address] [address]  Phone: [phone]   Fax:     [phone]    Physical Therapy  Cancellation/No-show Note  Patient Name:  Alize Malave  :  1960   Date:  2022  Cancelled visits to date: 2  No-shows to date:     Patient status for today's appointment patient:  [x]  Cancelled  []  Rescheduled appointment  []  No-show     Reason given by patient:  []  Patient ill  [x]  Conflicting appointment    []  No transportation    []  Conflict with work  []  No reason given  []  Other:     Comments:     Phone call information:   [x]  Phone call made today to patient at 9:00am time at number provided:   Cell phone #   []  Patient answered, conversation as follows:    [x]  Patient did not answer, message left as follows: PT LVM checking in to make sure the patient was okay. Additionally, PT reminded patient of next appointment on 22 at 8am and to please give us a call back to confirm that appointment.    []  Phone call not made today    Electronically signed by:  Nuria Ambrose, PT

## 2022-01-12 ENCOUNTER — HOSPITAL ENCOUNTER (OUTPATIENT)
Age: 62
Setting detail: OUTPATIENT SURGERY
Discharge: HOME | End: 2022-01-12
Attending: INTERNAL MEDICINE | Admitting: INTERNAL MEDICINE
Payer: MEDICARE

## 2022-01-12 ENCOUNTER — ANESTHESIA EVENT (OUTPATIENT)
Dept: ENDOSCOPY | Age: 62
End: 2022-01-12
Payer: MEDICARE

## 2022-01-12 ENCOUNTER — ANESTHESIA (OUTPATIENT)
Dept: ENDOSCOPY | Age: 62
End: 2022-01-12
Payer: MEDICARE

## 2022-01-12 VITALS
SYSTOLIC BLOOD PRESSURE: 140 MMHG | TEMPERATURE: 97.2 F | HEART RATE: 86 BPM | RESPIRATION RATE: 16 BRPM | BODY MASS INDEX: 26.45 KG/M2 | OXYGEN SATURATION: 99 % | WEIGHT: 224 LBS | HEIGHT: 77 IN | DIASTOLIC BLOOD PRESSURE: 126 MMHG

## 2022-01-12 VITALS
SYSTOLIC BLOOD PRESSURE: 96 MMHG | OXYGEN SATURATION: 97 % | RESPIRATION RATE: 12 BRPM | DIASTOLIC BLOOD PRESSURE: 59 MMHG

## 2022-01-12 PROCEDURE — 2580000003 HC RX 258: Performed by: INTERNAL MEDICINE

## 2022-01-12 PROCEDURE — 7100000010 HC PHASE II RECOVERY - FIRST 15 MIN: Performed by: INTERNAL MEDICINE

## 2022-01-12 PROCEDURE — 3700000001 HC ADD 15 MINUTES (ANESTHESIA): Performed by: INTERNAL MEDICINE

## 2022-01-12 PROCEDURE — 7100000011 HC PHASE II RECOVERY - ADDTL 15 MIN: Performed by: INTERNAL MEDICINE

## 2022-01-12 PROCEDURE — 3609027000 HC COLONOSCOPY: Performed by: INTERNAL MEDICINE

## 2022-01-12 PROCEDURE — 2500000003 HC RX 250 WO HCPCS: Performed by: INTERNAL MEDICINE

## 2022-01-12 PROCEDURE — 3700000000 HC ANESTHESIA ATTENDED CARE: Performed by: INTERNAL MEDICINE

## 2022-01-12 PROCEDURE — 2500000003 HC RX 250 WO HCPCS: Performed by: NURSE ANESTHETIST, CERTIFIED REGISTERED

## 2022-01-12 PROCEDURE — 2709999900 HC NON-CHARGEABLE SUPPLY: Performed by: INTERNAL MEDICINE

## 2022-01-12 PROCEDURE — 6360000002 HC RX W HCPCS: Performed by: NURSE ANESTHETIST, CERTIFIED REGISTERED

## 2022-01-12 RX ORDER — SODIUM CHLORIDE, SODIUM LACTATE, POTASSIUM CHLORIDE, CALCIUM CHLORIDE 600; 310; 30; 20 MG/100ML; MG/100ML; MG/100ML; MG/100ML
INJECTION, SOLUTION INTRAVENOUS CONTINUOUS
Status: DISCONTINUED | OUTPATIENT
Start: 2022-01-12 | End: 2022-01-12 | Stop reason: HOSPADM

## 2022-01-12 RX ORDER — PROPOFOL 10 MG/ML
INJECTION, EMULSION INTRAVENOUS CONTINUOUS PRN
Status: DISCONTINUED | OUTPATIENT
Start: 2022-01-12 | End: 2022-01-12 | Stop reason: SDUPTHER

## 2022-01-12 RX ORDER — LABETALOL HYDROCHLORIDE 5 MG/ML
5 INJECTION, SOLUTION INTRAVENOUS EVERY 10 MIN PRN
Status: DISCONTINUED | OUTPATIENT
Start: 2022-01-12 | End: 2022-01-12 | Stop reason: HOSPADM

## 2022-01-12 RX ORDER — LIDOCAINE HYDROCHLORIDE 20 MG/ML
INJECTION, SOLUTION INFILTRATION; PERINEURAL PRN
Status: DISCONTINUED | OUTPATIENT
Start: 2022-01-12 | End: 2022-01-12 | Stop reason: SDUPTHER

## 2022-01-12 RX ORDER — HYDRALAZINE HYDROCHLORIDE 20 MG/ML
5 INJECTION INTRAMUSCULAR; INTRAVENOUS EVERY 10 MIN PRN
Status: DISCONTINUED | OUTPATIENT
Start: 2022-01-12 | End: 2022-01-12 | Stop reason: HOSPADM

## 2022-01-12 RX ORDER — MEPERIDINE HYDROCHLORIDE 25 MG/ML
12.5 INJECTION INTRAMUSCULAR; INTRAVENOUS; SUBCUTANEOUS EVERY 5 MIN PRN
Status: DISCONTINUED | OUTPATIENT
Start: 2022-01-12 | End: 2022-01-12 | Stop reason: HOSPADM

## 2022-01-12 RX ADMIN — LIDOCAINE HYDROCHLORIDE 100 MG: 20 INJECTION, SOLUTION INFILTRATION; PERINEURAL at 09:17

## 2022-01-12 RX ADMIN — PROPOFOL 200 MCG/KG/MIN: 10 INJECTION, EMULSION INTRAVENOUS at 09:24

## 2022-01-12 RX ADMIN — SODIUM CHLORIDE, POTASSIUM CHLORIDE, SODIUM LACTATE AND CALCIUM CHLORIDE: 600; 310; 30; 20 INJECTION, SOLUTION INTRAVENOUS at 08:18

## 2022-01-12 ASSESSMENT — PAIN DESCRIPTION - PAIN TYPE: TYPE: ACUTE PAIN

## 2022-01-12 ASSESSMENT — PULMONARY FUNCTION TESTS
PIF_VALUE: 0
PIF_VALUE: 1
PIF_VALUE: 0
PIF_VALUE: 1
PIF_VALUE: 1
PIF_VALUE: 0
PIF_VALUE: 1
PIF_VALUE: 0
PIF_VALUE: 0
PIF_VALUE: 1
PIF_VALUE: 0
PIF_VALUE: 1
PIF_VALUE: 1
PIF_VALUE: 0
PIF_VALUE: 1
PIF_VALUE: 0
PIF_VALUE: 1
PIF_VALUE: 0
PIF_VALUE: 0

## 2022-01-12 ASSESSMENT — PAIN - FUNCTIONAL ASSESSMENT: PAIN_FUNCTIONAL_ASSESSMENT: 0-10

## 2022-01-12 NOTE — H&P
History and Physical / Pre-Sedation Assessment    Patient:  Denita Hare   :   1960     Intended Procedure:  colonoscopy    HPI: rectal bleed. Fh of colon cancer. Past colonoscopy was poorly prepared    Past Medical History:   has a past medical history of Arthritis, Arthropathy, Back pain, Depression, Gout, Hyperlipidemia, Hypertension, and Wears dentures. Past Surgical History:   has a past surgical history that includes Colonoscopy; Endoscopy, colon, diagnostic; lumbar fusion (2017); Arm Surgery (Left, 2020); Arm Surgery (Right, 10/13/2020); back surgery; and Colonoscopy (N/A, 12/10/2021). Medications:  Prior to Admission medications    Medication Sig Start Date End Date Taking? Authorizing Provider   allopurinol (ZYLOPRIM) 300 MG tablet Take 1 tablet by mouth daily For arthritis stop allopurinol 100 mg 10/25/21  Yes Todd Burch MD   amLODIPine (NORVASC) 10 MG tablet Take 1 tablet by mouth daily 10/25/21  Yes Todd Burch MD   atorvastatin (LIPITOR) 20 MG tablet Take 1 tablet by mouth daily To prevent heart disease 10/25/21  Yes Todd Burch MD   lisinopril (PRINIVIL;ZESTRIL) 40 MG tablet Take 1 tablet by mouth daily 10/25/21  Yes Todd Burch MD   tiZANidine (ZANAFLEX) 4 MG tablet Take 1 tablet by mouth nightly as needed (prn) Muscle spasm  Neck pain 10/25/21  Yes Todd Burch MD       Family History:  family history includes Cancer in his mother; Diabetes in his maternal uncle; Hypertension in his mother. Social History:   reports that he has never smoked. He has never used smokeless tobacco. He reports that he does not drink alcohol and does not use drugs. Allergies:  Patient has no known allergies. ROS:  twelve point system review was unremarkable except for above noted history. Nurses notes reviewed and agreed.   Medications reviewed    Physical Exam:  Vital Signs: /86   Pulse 79   Temp 98.3 °F (36.8 °C)   Resp 18   Ht 6' 5\" (1.956 m)   Wt 224 lb (101.6 kg)   SpO2 97%   BMI 26.56 kg/m²    Skin: normal  HEENT: normal  Neck: supple. No adenopathy. No thyromegaly. No JVD. Pulmonary:Normal  Cardiac:Normal  Abdomen:Normal  MS: normal  Neuro: normal  Ext: no edema. Pulses normal    Pre-Procedure Assessment / Plan:  ASA 2 - Patient with mild systemic disease with no functional limitations  Mallampati Airway Assessment:  Mallampati Class II - (soft palate, fauces & uvula are visible)  Level of Sedation Plan: Moderate sedation  Post Procedure plan: Return to same level of care    I assessed the patient and find that the patient is in satisfactory condition to proceed with the planned procedure and sedation plan. I have explained the risk, benefits, and alternatives to the procedure. The patient understands and agrees to proceed.   Yes    Ally Aragon MD  9:25 AM 1/12/2022

## 2022-01-12 NOTE — OP NOTE
[facility] [address]                                OPERATIVE REPORT    PATIENT NAME: Edelmira Ervin                      :        1960  MED REC NO:   [id number]                          ROOM:  ACCOUNT NO:   [de-identified]                           ADMIT DATE: 2022  PROVIDER:     Hank Corral MD    DATE OF PROCEDURE:  2022    SURGEON:  Hank Corral MD.    INDICATION FOR PROCEDURE:  A 56-year-old man with family history of  colon cancer, history of polyps, and rectal bleed. His last colonoscopy  was very poorly prepared. The patient was instructed on proper  preparation and today he is having another colonoscopy. DESCRIPTION OF THE PROCEDURE:  With the patient in the left lateral  position and after IV Diprivan, the Olympus video colonoscope was  inserted into the rectum and carefully advanced to cecum. The colon is  better prepared even though still not ideal.  However, careful  inspection did not show any signs of recurrent polyp, carcinoma or  inflammatory bowel. The scope was then removed without complications. IMPRESSION:  Normal colonoscopy. ESTIMATED BLOOD LOSS:  None. Mark Chavez MD    D: 2022 10:01:05       T: 2022 10:39:56     [email or web address]  Job#: [id number]     Doc#: [id number]    CC:   MD Yolis Emerson MD

## 2022-01-12 NOTE — ANESTHESIA PRE PROCEDURE
Department of Anesthesiology  Preprocedure Note       Name:  Gavi Martinez   Age:  62 y.o.  :  1960                                          MRN:  [id number]         Date:  2022      Surgeon: Rivas Patel):  Nancy Rey MD    Procedure: Procedure(s):  COLONOSCOPY DIAGNOSTIC    Medications prior to admission:   Prior to Admission medications    Medication Sig Start Date End Date Taking? Authorizing Provider   allopurinol (ZYLOPRIM) 300 MG tablet Take 1 tablet by mouth daily For arthritis stop allopurinol 100 mg 10/25/21  Yes Thuy Sevilla MD   amLODIPine (NORVASC) 10 MG tablet Take 1 tablet by mouth daily 10/25/21  Yes Thuy Sevilla MD   atorvastatin (LIPITOR) 20 MG tablet Take 1 tablet by mouth daily To prevent heart disease 10/25/21  Yes Thuy Sevilla MD   lisinopril (PRINIVIL;ZESTRIL) 40 MG tablet Take 1 tablet by mouth daily 10/25/21  Yes Thuy Sevilla MD   tiZANidine (ZANAFLEX) 4 MG tablet Take 1 tablet by mouth nightly as needed (prn) Muscle spasm  Neck pain 10/25/21  Yes Thuy Sevilla MD       Current medications:    Current Facility-Administered Medications   Medication Dose Route Frequency Provider Last Rate Last Admin    lactated ringers infusion   IntraVENous Continuous Nancy Rey  mL/hr at 22 0818 New Bag at 22 0818       Allergies:  No Known Allergies    Problem List:    Patient Active Problem List   Diagnosis Code    Chest pain R07.9    Abnormal stress test R94.39    TISHA positive R76.8    DDD (degenerative disc disease), cervical M50.30    DDD (degenerative disc disease), lumbar M51.36    Depression F32. A    Helicobacter pylori infection A04.8    Hand arthritis M19.049    Hypertension I10    Hypertriglyceridemia E78.1    Left arm weakness R29.898    Left low back pain M54.50    Neck pain on left side M54.2    Right wrist pain M25.531    SS-A antibody positive R76.8    SS-B antibody positive R76.8    Postlaminectomy syndrome, lumbar M96.1    [facility] F19.11    Chronic pain syndrome G89.4    Synovitis of wrist M65.9       Past Medical History:        Diagnosis Date    Arthritis     neck, back    Arthropathy     TISHA positive    Back pain     Depression     Gout     Hyperlipidemia     Hypertension     Wears dentures        Past Surgical History:        Procedure Laterality Date    ARM SURGERY Left 9/22/2020    LEFT ULNAR NERVE DECOMPRESSION (AT ELBOW) AND LEFT CARPAL TUNNEL RELEASE performed by Jossy Huertas MD at [address] [place] [address]. Right 10/13/2020    RIGHT ULNAR NERVE DECOMPRESSION AT ELBOW AND RIGHT CARPAL TUNNEL RELEASE performed by Jossy Huertas MD at  [address]      2015    COLONOSCOPY      COLONOSCOPY N/A 12/10/2021    COLONOSCOPY POLYPECTOMY SNARE/COLD BIOPSY performed by Iain Arenas MD at [facility] COLON, DIAGNOSTIC     [address] [facility]  2017    PLIF L3-S1 done in [place], [place]       Social History:    Social History     Tobacco Use    Smoking status: Never Smoker    Smokeless tobacco: Never Used   Substance Use Topics    Alcohol use: No                                Counseling given: Not Answered      Vital Signs (Current):   Vitals:    01/12/22 0803   BP: 127/86   Pulse: 79   Resp: 18   Temp: 98.3 °F (36.8 °C)   SpO2: 97%   Weight: 224 lb (101.6 kg)   Height: 6' 5\" (1.956 m)                                              BP Readings from Last 3 Encounters:   01/12/22 127/86   01/03/22 129/86   12/10/21 108/60       NPO Status: Time of last liquid consumption: 2000                        Time of last solid consumption: 2000                        Date of last liquid consumption: 01/11/22                        Date of last solid food consumption: 01/10/22    BMI:   Wt Readings from Last 3 Encounters:   01/12/22 224 lb (101.6 kg)   01/03/22 224 lb (101.6 kg)   12/10/21 220 lb (99.8 kg)     Body mass index is 26.56 kg/m².     CBC:   Lab Results   Component Value Date    WBC 5.4 09/27/2021    RBC 5.23 09/27/2021    HGB 14.2 09/27/2021    HCT 44.0 09/27/2021    MCV 84.1 09/27/2021    RDW 14.1 09/27/2021     09/27/2021       CMP:   Lab Results   Component Value Date     09/27/2021    K 4.7 09/27/2021     09/27/2021    CO2 25 09/27/2021    BUN 13 09/27/2021    CREATININE 0.9 09/27/2021    GFRAA >60 09/27/2021    AGRATIO 1.3 08/14/2020    LABGLOM >60 09/27/2021    GLUCOSE 90 09/27/2021    PROT 8.0 08/14/2020    CALCIUM 10.0 09/27/2021    BILITOT <0.2 08/14/2020    ALKPHOS 71 08/14/2020    AST 14 09/27/2021    ALT 16 09/27/2021       POC Tests: No results for input(s): POCGLU, POCNA, POCK, POCCL, POCBUN, POCHEMO, POCHCT in the last 72 hours. Coags:   Lab Results   Component Value Date    PROTIME 10.9 09/16/2015    INR 1.01 09/16/2015    APTT 35.7 09/16/2015       HCG (If Applicable): No results found for: PREGTESTUR, PREGSERUM, HCG, HCGQUANT     ABGs: No results found for: PHART, PO2ART, ZZV2CXM, ZCQ8YDL, BEART, B0RLGNUX     Type & Screen (If Applicable):  No results found for: LABABO, LABRH    Drug/Infectious Status (If Applicable):  No results found for: HIV, HEPCAB    COVID-19 Screening (If Applicable):   Lab Results   Component Value Date    COVID19 NOT DETECTED 10/07/2020           Anesthesia Evaluation  Patient summary reviewed and Nursing notes reviewed no history of anesthetic complications:   Airway: Mallampati: II  TM distance: >3 FB   Neck ROM: full  Mouth opening: > = 3 FB Dental: normal exam         Pulmonary:Negative Pulmonary ROS and normal exam                               Cardiovascular:    (+) hypertension:,                   Neuro/Psych:   (+) psychiatric history:            GI/Hepatic/Renal: Neg GI/Hepatic/Renal ROS            Endo/Other: Negative Endo/Other ROS                    Abdominal:             Vascular: negative vascular ROS. Other Findings:             Anesthesia Plan      MAC     ASA 2       Induction: intravenous.       Anesthetic plan and risks discussed with patient. Plan discussed with CRNA.     Attending anesthesiologist reviewed and agrees with Preprocedure content              Avis Gambino MD   1/12/2022

## 2022-01-12 NOTE — PROGRESS NOTES
Ambulatory Surgery/Procedure Discharge Note    Vitals:    01/12/22 1020   BP: (!) 140/126   Pulse: 86   Resp: 16   Temp:    SpO2: 99%     Patient arrived to Phase II recovery drowsy and Oriented x4. Vitals stable. Patient denies pain. No nausea or vomiting. Mother-in-law at bedside. Discharge instructions reviewed with patient and mother-in-law. Both verbalize understanding. In: 220 [P.O.:220]  Out: -     Restroom use offered before discharge. Yes    Pain assessment:  none  Pain Level: 2 (neck - chronic pain)        Patient discharged to home/self care. Patient discharged via wheel chair home with mother-in-law.       1/12/2022 11:15 AM

## 2022-01-12 NOTE — ANESTHESIA POSTPROCEDURE EVALUATION
Department of Anesthesiology  Postprocedure Note    Patient: Edgar Patton  MRN: [id number]  YOB: 1960  Date of evaluation: 1/12/2022  Time:  10:08 AM     Procedure Summary     Date: 01/12/22 Room / Location: [address] [place] [address] / [facility]    Anesthesia Start: [address] [facility] Anesthesia Stop: 0761    Procedure: COLONOSCOPY DIAGNOSTIC (N/A ) Diagnosis: (screening)    Surgeons: Eulogio Joseph MD Responsible Provider: Shane Palmer MD    Anesthesia Type: MAC ASA Status: 2          Anesthesia Type: MAC    Terra Phase I: Terra Score: 10    Terra Phase II:      Last vitals: Reviewed and per EMR flowsheets.        Anesthesia Post Evaluation    Patient location during evaluation: PACU  Patient participation: complete - patient participated  Level of consciousness: awake and alert  Airway patency: patent  Nausea & Vomiting: no nausea and no vomiting  Complications: no  Cardiovascular status: hemodynamically stable  Respiratory status: acceptable  Hydration status: euvolemic

## 2022-01-13 ENCOUNTER — HOSPITAL ENCOUNTER (OUTPATIENT)
Dept: PHYSICAL THERAPY | Age: 62
Setting detail: THERAPIES SERIES
Discharge: HOME | End: 2022-01-13
Payer: MEDICARE

## 2022-01-13 PROCEDURE — 97112 NEUROMUSCULAR REEDUCATION: CPT

## 2022-01-13 PROCEDURE — 97140 MANUAL THERAPY 1/> REGIONS: CPT

## 2022-01-13 PROCEDURE — 97110 THERAPEUTIC EXERCISES: CPT

## 2022-01-13 NOTE — FLOWSHEET NOTE
[address] [facility] [address]  Phone: [phone]   Fax:     [phone]    Physical Therapy Treatment Note/ Progress Report:     Date:  2022    Patient Name:  Rebeca Braga    :  1960  MRN: [id number]    Pertinent Medical History:  HTN, hyperlipidemia, depression, back pain    Medical/Treatment Diagnosis Information:  · Diagnosis: M54.2 - Cervicalgia, M50.30 - DDD, cervical  · Treatment Diagnosis: Decreased mobility and pain. Insurance/Certification information:  [employer] Medicare  Physician Information:  Gunnar Nassar   Plan of care signed (Y/N): sent  (received) - Progress note from 21 signed for approval of dry needling     Date of Patient follow up with Physician:      Progress Report: []  Yes  [x]  No     Date Range for reporting period:  Beginnin21  Ending:     Progress report due (10 Rx/or 30 days whichever is less):      Recertification due (POC duration/ or 90 days whichever is less):     Visit # Insurance/POC Allowable Auth Needed   11 BOMN []Yes    [x]No     Functional Outcomes Measure:    Date Assessed: at eval (21)  Test: NDI  Score: 23 = 46%    Pain level:  Not assessed today/10     SUBJECTIVE:    21: Pt reports compliance with HEP. He reports some relief with the stretching exercises. 11/15/21: Pt reports, \"I felt pretty good after last PT visit. \" He continues to have some discomfort on the lower L side of his neck. 21: Pt reports the L side of his neck is in pain today. He reports no specific mechanism for the increased pain today. 21: Pt reports, \"My neck is feeling about the same. \" He states he is tired today because he didn't sleep well over the weekend. 12/3/21:   21: Pt reports, \"My neck is a little stiff, but doing somewhat better. \"  Notes if he does too much on some days it gets sore.    21: Pt reports, \"My neck is feeling about the same, same days good, same days bad. \" He has a follow up with his physician in January. 01/05/21: Patient reports neck is about the same. States he is having MRI done on 01/08/21.   1/7/22: Pt reports, \"Things are feeling sore. I get an MRI tomorrow on my neck. \"    1/13/22: Pt states he had an MRI on Saturday. MRI impression/results below. He has an appointment with a specialist on Tuesday, January 18th. He is continuing to have pain/achiness on the L side    OBJECTIVE:    Observation:    Test measurements:     MRI Impression: Extensive multilevel spinal stenosis with superimposed disc herniation at C7-T1 as detailed above.       CERV ROM       Cervical Flexion 75%, pulling     Cervical Extension 50%       Left Right   Cervical SB 50% 50%, pulling    Cervical rotation 75% 75%, pain   UE Strength  Left Right   Shoulder Flex 4/5, pain 4/5   Shoulder Scap 4/5, pain 4/5   Shoulder ABd (C5 Axillary) 4/5, pain 4/5   Shoulder ER  4/5 4/5   Shoulder IR 4/5 4/5   Elbow Flex (C5 Musc) 5/5 5/5   Elbow Ext (C7 Radial) 5/5 5/5   Wrist Flex (C6 Radial) 5/5 5/5   Wrist Ext (C7 Radial) 5/5 5/5   APB (T1 Median) 5/5 5/5                   RESTRICTIONS/PRECAUTIONS: None    Exercises/Interventions:   Therapeutic Ex (75400)  Min: 10' Resistance/Repetitions Notes   LS stretch   UT stretch     Doorway Pec Str 3x30\" Good suraj. T-Slide      Rows      Ext      ER      IR      Horiz Abd     2x15, green  2x15, green  2x10, green   Good suraj. Both sides  Both Sides  Palms up   BL ER (t-band) Red, 2x15 fatigues   Seated Thoracic Rot x10 ea direction Hand to opposite knee             Manual Intervention (25446)  Min: 20'      Cerv mobs/manip Grade 3 sideglides x5 mins L C5-7    Thoracic mobs/manip Grade 3 upper thoracic PA glides x5 mins    CT manip     Rib mobilizations     DTM L UT, LS x10 mins Seated        NMR re-education (76870)  Min: 10'     Chin Tucks  10x 3 sec holds L and R, good suraj. Scap retractraion Good suraj. Scaption 3#, 2x10 Good suraj. SA Wall Slides w/ chin tuck 2x10    Therapeutic Activity (10628)  Min:               Modalities  Min: 10'     MHP 10 min Cervical/L shoulder          Other Therapeutic Activities:  Pt was educated on PT POC, Diagnosis, Prognosis, pathomechanics as well as frequency and duration of scheduling future physical therapy appointments. Time was also taken on this day to answer all patient questions and participation in PT. Reviewed appointment policy in detail with patient and patient verbalized understanding. Home Exercise Program:    Therapeutic Exercise and NMR EXR  [x] (94801) Provided verbal/tactile cueing for activities related to strengthening, flexibility, endurance, ROM  for improvements in cervical, postural, scapular, scapulothoracic and UE control with self care, reaching, carrying, lifting, house/yardwork, driving/computer work. [x] (58955) Provided verbal/tactile cueing for activities related to improving balance, coordination, kinesthetic sense, posture, motor skill, proprioception  to assist with cervical, scapular, scapulothoracic and UE control with self care, reaching, carrying, lifting, house/yardwork, driving/computer work. Therapeutic Activities:    [] (25757 or 67406) Provided verbal/tactile cueing for activities related to improving balance, coordination, kinesthetic sense, posture, motor skill, proprioception and motor activation to allow for proper function of cervical, scapular, scapulothoracic and UE control with self care, carrying, lifting, driving/computer work.      Home Exercise Program:    [x] (70691) Reviewed/Progressed HEP activities related to strengthening, flexibility, endurance, ROM of cervical, scapular, scapulothoracic and UE control with self care, reaching, carrying, lifting, house/yardwork, driving/computer work  [] (40928) Reviewed/Progressed HEP activities related to improving balance, coordination, kinesthetic sense, posture, motor skill, proprioception of cervical, scapular, scapulothoracic and UE control with self care, reaching, carrying, lifting, house/yardwork, driving/computer work      Manual Treatments:  PROM / STM / Oscillations-Mobs:  G-I, II, III, IV (PA's, Inf., Post.)  [x] (66286) Provided manual therapy to mobilize soft tissue/joints of cervical/CT, scapular GHJ and UE for the purpose of decreasing headache, modulating pain, promoting relaxation,  increasing ROM, reducing/eliminating soft tissue swelling/inflammation/restriction, improving soft tissue extensibility and allowing for proper ROM for normal function with self care, reaching, carrying, lifting, house/yardwork, driving/computer work    Charges:  Timed Code Treatment Minutes: 40   Total Treatment Minutes: 50     [] EVAL (LOW) 01720 (typically 20 minutes face-to-face)  [] EVAL (MOD) 01143 (typically 30 minutes face-to-face)  [] EVAL (HIGH) 72432 (typically 45 minutes face-to-face)  [] RE-EVAL     [x] QC(14455) x   1  [] Dry needle 1 or 2 Muscles (14771)  [x] NMR (07599) x   1  [] Dry needle 3+ Muscles (91508)  [x] Manual (32312) x  1   [] Ultrasound (58770) x  [] TA (70303) x     [] Mech Traction (60958)  [] ES(attended) (46022)     [] ES (un) (89619):   [] Vasopump (81502) [] Ionto (45243)   [] Other:    GOALS:  Patient stated goal: \"To stop the pain. \"  [x]? Progressing: []? Met: []? Not Met: []? Adjusted     Therapist goals for Patient:   Short Term Goals: To be achieved in: 2 weeks  1. Independent in HEP and progression per patient tolerance, in order to prevent re-injury. []? Progressing: [x]? Met: []? Not Met: []? Adjusted  2. Patient will have a decrease in pain to facilitate improvement in movement, function, and ADLs as indicated by Functional Deficits. [x]? Progressing: []? Met: []? Not Met: []? Adjusted     Long Term Goals: To be achieved in: 6 weeks  1.  Disability index score of 35% or less for the NDI to assist with reaching prior level of function. [x]? Progressing: []? Met: []? Not Met: []? Adjusted  2. Patient will demonstrate increased AROM to [facility] of cervical/thoracic spine to allow for proper joint functioning as indicated by patients Functional Deficits. []? Progressing: [x]? Met: []? Not Met: []? Adjusted  3. Patient will demonstrate an increase in postural awareness and control and activation of  Deep cervical stabilizers to allow for proper functional mobility as indicated by patients Functional Deficits. [x]? Progressing: []? Met: []? Not Met: []? Adjusted  4. Patient will return to lifting 20 lbs to chest height without increased symptoms or restriction. [x]? Progressing: []? Met: []? Not Met: []? Adjusted  5. Pt will be able to look over his left shoulder without an increase in pain/symptoms. []? Progressing: [x]? Met: []? Not Met: []? Adjusted            ASSESSMENT:  Pt displays good tolerance to PT follow up appointment. Pt reported decreased stiffness in cervical/thoracic area after manual therapy treatment. L upper trap tightness decreasing. Treatment/Activity Tolerance:  [x] Patient tolerated treatment well [] Patient limited by fatique  [] Patient limited by pain  [] Patient limited by other medical complications  [] Other:     Overall Progression Towards Functional goals/ Treatment Progress Update:  [x] Patient is progressing as expected towards functional goals listed. [] Progression is slowed due to complexities/Impairments listed. [] Progression has been slowed due to co-morbidities.   [] Plan just implemented, too soon to assess goals progression <30days   [] Goals require adjustment due to lack of progress  [] Patient is not progressing as expected and requires additional follow up with physician  [] Other    Prognosis for POC: [x] Good [] Fair  [] Poor    Patient requires continued skilled intervention: [x] Yes  [] No        PLAN:   [x] Continue per plan of care [] Alter current plan (see comments)  [] Plan of care initiated [] Hold pending MD visit [] Discharge    Electronically signed by: Bryan Ramires PT       Note: If patient does not return for scheduled/recommended follow up visits, this note will serve as a discharge from care along with the most recent update on progress.

## 2022-01-18 ENCOUNTER — OFFICE VISIT (OUTPATIENT)
Dept: ORTHOPEDIC SURGERY | Age: 62
End: 2022-01-18
Payer: MEDICARE

## 2022-01-18 ENCOUNTER — HOSPITAL ENCOUNTER (OUTPATIENT)
Dept: PHYSICAL THERAPY | Age: 62
Setting detail: THERAPIES SERIES
Discharge: HOME | End: 2022-01-18
Payer: MEDICARE

## 2022-01-18 VITALS — BODY MASS INDEX: 26.45 KG/M2 | WEIGHT: 224 LBS | HEIGHT: 77 IN

## 2022-01-18 DIAGNOSIS — M50.20 CERVICAL DISC HERNIATION: Primary | ICD-10-CM

## 2022-01-18 PROCEDURE — 97112 NEUROMUSCULAR REEDUCATION: CPT

## 2022-01-18 PROCEDURE — 99204 OFFICE O/P NEW MOD 45 MIN: CPT | Performed by: ORTHOPAEDIC SURGERY

## 2022-01-18 PROCEDURE — 97110 THERAPEUTIC EXERCISES: CPT

## 2022-01-18 PROCEDURE — 97140 MANUAL THERAPY 1/> REGIONS: CPT

## 2022-01-18 NOTE — PROGRESS NOTES
New Patient: CERVICAL SPINE    Referring Provider:  Leif Brown MD    CHIEF COMPLAINT:    Chief Complaint   Patient presents with    Neck Pain     cervical       HISTORY OF PRESENT ILLNESS:   Mr. Channing Carrillo is a pleasant 62 y.o. old with a 7-year history of atraumatic neck and left arm pain. He has chronic low back pain. His neck and arm pain increased approximately 2 years ago his arm pain radiates to his left elbow and hand. He rates the intensity of his neck and arm pain 8/10 in his low back pain 6/10. He notes numbness tingling and weakness of his left arm and leg. He denies loss of fine motor control. He notes difficulty with his balance. He is status post left ulnar median nerve decompression.   Current/Past Treatment:   · Physical Therapy: Tried  · Chiropractic: None  · Injection: None  · Medications: Oral steroids and Zanaflex    Past Medical History:   Past Medical History:   Diagnosis Date    Arthritis     neck, back    Arthropathy     TISHA positive    Back pain     Depression     Gout     Hyperlipidemia     Hypertension     Wears dentures       Past Surgical History:     Past Surgical History:   Procedure Laterality Date    ARM SURGERY Left 9/22/2020    LEFT ULNAR NERVE DECOMPRESSION (AT ELBOW) AND LEFT CARPAL TUNNEL RELEASE performed by Yung Godinez MD at [address] [place] [address]. Right 10/13/2020    RIGHT ULNAR NERVE DECOMPRESSION AT ELBOW AND RIGHT CARPAL TUNNEL RELEASE performed by Yung Godinez MD at [address]      2015    COLONOSCOPY      COLONOSCOPY N/A 12/10/2021    COLONOSCOPY POLYPECTOMY SNARE/COLD BIOPSY performed by Ashley Ignacio MD at [address] [facility] [address] 1/12/2022    COLONOSCOPY DIAGNOSTIC performed by Ashley Ignacio MD at [facility], DIAGNOSTIC      LUMBAR FUSION  2017    PLIF L3-S1 done in [place], [place]     Current Medications:     Current Outpatient Medications:     allopurinol (ZYLOPRIM) 300 MG tablet, Take 1 tablet by mouth daily For arthritis stop allopurinol 100 mg, Disp: 90 tablet, Rfl: 1    amLODIPine (NORVASC) 10 MG tablet, Take 1 tablet by mouth daily, Disp: 90 tablet, Rfl: 1    atorvastatin (LIPITOR) 20 MG tablet, Take 1 tablet by mouth daily To prevent heart disease, Disp: 90 tablet, Rfl: 1    lisinopril (PRINIVIL;ZESTRIL) 40 MG tablet, Take 1 tablet by mouth daily, Disp: 90 tablet, Rfl: 1    tiZANidine (ZANAFLEX) 4 MG tablet, Take 1 tablet by mouth nightly as needed (prn) Muscle spasm  Neck pain, Disp: 30 tablet, Rfl: 5  Allergies:  Patient has no known allergies. Social History:    reports that he has never smoked. He has never used smokeless tobacco. He reports that he does not drink alcohol and does not use drugs. Family History:   Family History   Problem Relation Age of Onset   Soraida Rebolledo Cancer Mother     Hypertension Mother     Diabetes Maternal Uncle        REVIEW OF SYSTEMS: Full ROS noted & scanned   CONSTITUTIONAL: Denies unexplained weight loss, fevers, chills or fatigue  NEUROLOGICAL: Denies unsteady gait or progressive weakness  MUSCULOSKELETAL: Denies joint swelling or redness  PSYCHOLOGICAL: Denies anxiety, depression   SKIN: Denies skin changes, delayed healing, rash, itching   HEMATOLOGIC: Denies easy bleeding or bruising  ENDOCRINE: Denies excessive thirst, urination, heat/cold  RESPIRATORY: Denies current dyspnea, cough  GI: Denies nausea, vomiting, diarrhea   : Denies bowel or bladder issues       PHYSICAL EXAM:    Vitals: Height 6' 5.01\" (1.956 m), weight 224 lb (101.6 kg). GENERAL EXAM:  · General Apparence: Patient is adequately groomed with no evidence of malnutrition. · Orientation: The patient is oriented to time, place and person. · Mood & Affect:The patient's mood and affect are appropriate   · Vascular: Examination reveals no swelling tenderness in upper or lower extremities.  Good capillary refill  · Lymphatic: The lymphatic examination bilaterally reveals all areas to be without enlargement or induration  · Sensation: Sensation is intact without deficit  · Coordination/Balance: Good coordination     CERVICAL EXAMINATION:  · Inspection: Local inspection shows no step-off or bruising. Cervical alignment is normal.     · Palpation: No evidence of tenderness at the midline, and trapezius. Paraspinal tenderness is present. There is no step-off or paraspinal spasm. · Range of Motion: Cervical flexion, extension, and rotation are mildly reduced with pain. · Strength: 5/5 bilateral upper extremities   · Special Tests:    ·  Ramos's negative bilaterally. ·      Cubital and Carpal tunnel Tinel's negative bilaterally. · Skin:There are no rashes, ulcerations or lesions in right & left upper extremities. · Reflexes: Bilaterally triceps, biceps and brachioradialis are 2+. Clonus absent bilaterally at the feet. · Gait & station: normal, patient ambulates without assistance     · Additional Examinations:       · RIGHT UPPER EXTREMITY:  Inspection/examination of the right upper extremity does not show any tenderness, deformity or injury. Range of motion is unremarkable. There is no gross instability. There are no rashes, ulcerations or lesions. Strength and tone are normal.  · LEFT UPPER EXTREMITY: Inspection/examination of the left upper extremity does not show any tenderness, deformity or injury. Range of motion is unremarkable. There is no gross instability. There are no rashes, ulcerations or lesions. Strength and tone are normal.    Diagnostic Testing:  I reviewed MRI images of his cervical spine from 1/10/2022 in the office today. Those show spondylosis worse C5-C6 6 and C6-C7 with a large left paracentral disc herniation C7-T1 with severe foraminal stenosis and moderate to severe central stenosis. I reviewed x-rays of his cervical spine from 10/29/2021 in the office today. Those show spondylosis worst at C5-C6 and C6-C7.   His C7-T1 level is visible on his x-rays    Impression:   Left paracentral disc herniation C7-T1 with 3-day colopathy and myelopathy    Plan:    Discussed treatment options including observation, physical therapy, medications, epidural injections and additional imaging. He would like to proceed with cervical surgery. Given his multilevel cervical spondylosis, I referred him to Dr. Lynda Quan for that.

## 2022-01-18 NOTE — FLOWSHEET NOTE
[address] [facility] [address]  Phone: [phone]   Fax:     [phone]    Physical Therapy Treatment Note/ Progress Report:     Date:  2022    Patient Name:  Caitlyn Loera    :  1960  MRN: [id number]    Pertinent Medical History:  HTN, hyperlipidemia, depression, back pain    Medical/Treatment Diagnosis Information:  · Diagnosis: M54.2 - Cervicalgia, M50.30 - DDD, cervical  · Treatment Diagnosis: Decreased mobility and pain. Insurance/Certification information:  [employer] Medicare  Physician Information:  Yaniv Reyes   Plan of care signed (Y/N): sent  (received) - Progress note from 21 signed for approval of dry needling     Date of Patient follow up with Physician:      Progress Report: []  Yes  [x]  No     Date Range for reporting period:  Beginnin21  Ending:     Progress report due (10 Rx/or 30 days whichever is less):      Recertification due (POC duration/ or 90 days whichever is less):     Visit # Insurance/POC Allowable Auth Needed   12 BOMN []Yes    [x]No     Functional Outcomes Measure:    Date Assessed: at eval (21)  Test: NDI  Score: 23 = 46%    Pain level:  Not assessed today/10     SUBJECTIVE:    21: Pt reports compliance with HEP. He reports some relief with the stretching exercises. 11/15/21: Pt reports, \"I felt pretty good after last PT visit. \" He continues to have some discomfort on the lower L side of his neck. 21: Pt reports the L side of his neck is in pain today. He reports no specific mechanism for the increased pain today. 21: Pt reports, \"My neck is feeling about the same. \" He states he is tired today because he didn't sleep well over the weekend. 12/3/21:   21: Pt reports, \"My neck is a little stiff, but doing somewhat better. \"  Notes if he does too much on some days it gets sore.    21: Pt reports, \"My neck is feeling about the same, same days good, same days bad. \" He has a follow up with his physician in January. 01/05/21: Patient reports neck is about the same. States he is having MRI done on 01/08/21.   1/7/22: Pt reports, \"Things are feeling sore. I get an MRI tomorrow on my neck. \"    1/13/22: Pt states he had an MRI on Saturday. MRI impression/results below. He has an appointment with a specialist on Tuesday, January 18th. He is continuing to have pain/achiness on the L side  1/18/22: Pt reports, \"my neck is feeling about the same. \" He states the pain is along his L upper trap region. He has a doctors appointment today after his PT appointment. OBJECTIVE:    Observation:    Test measurements:     MRI Impression: Extensive multilevel spinal stenosis with superimposed disc herniation at C7-T1 as detailed above.       CERV ROM       Cervical Flexion 75%, pulling     Cervical Extension 50%       Left Right   Cervical SB 50% 50%, pulling    Cervical rotation 75% 75%, pain   UE Strength  Left Right   Shoulder Flex 4/5, pain 4/5   Shoulder Scap 4/5, pain 4/5   Shoulder ABd (C5 Axillary) 4/5, pain 4/5   Shoulder ER  4/5 4/5   Shoulder IR 4/5 4/5   Elbow Flex (C5 Musc) 5/5 5/5   Elbow Ext (C7 Radial) 5/5 5/5   Wrist Flex (C6 Radial) 5/5 5/5   Wrist Ext (C7 Radial) 5/5 5/5   APB (T1 Median) 5/5 5/5                   RESTRICTIONS/PRECAUTIONS: None    Exercises/Interventions:   Therapeutic Ex (99970)  Min: 10' Resistance/Repetitions Notes   LS stretch   UT stretch     Doorway Pec Str 3x30\" Good suraj. T-Slide      Rows      Ext      ER      IR      Horiz Abd     2x15, green  2x15, green  2x10, green   Good suraj.     Both sides  Both Sides  Palms up   BL ER (t-band) Red, 2x15 fatigues   Seated Thoracic Rot x10 ea direction Hand to opposite knee             Manual Intervention (23539)  Min: 20'      Cerv mobs/manip Grade 3 sideglides x5 mins L C5-7    Thoracic mobs/manip Grade 3 upper thoracic PA glides x5 mins    CT manip     Rib mobilizations     DTM L UT, LS x10 mins Seated        NMR re-education (64141)  Min: 10'     Chin Tucks  10x 3 sec holds L and R, good suraj. Scap retractraion Good suraj. Scaption 4#, 2x10 Good suraj. SA Wall Slides w/ chin tuck 2x10    Therapeutic Activity (52747)  Min:               Modalities  Min: 10'     MHP 10 min Cervical/L shoulder          Other Therapeutic Activities:  Pt was educated on PT POC, Diagnosis, Prognosis, pathomechanics as well as frequency and duration of scheduling future physical therapy appointments. Time was also taken on this day to answer all patient questions and participation in PT. Reviewed appointment policy in detail with patient and patient verbalized understanding. Home Exercise Program:    Therapeutic Exercise and NMR EXR  [x] (56561) Provided verbal/tactile cueing for activities related to strengthening, flexibility, endurance, ROM  for improvements in cervical, postural, scapular, scapulothoracic and UE control with self care, reaching, carrying, lifting, house/yardwork, driving/computer work. [x] (80371) Provided verbal/tactile cueing for activities related to improving balance, coordination, kinesthetic sense, posture, motor skill, proprioception  to assist with cervical, scapular, scapulothoracic and UE control with self care, reaching, carrying, lifting, house/yardwork, driving/computer work. Therapeutic Activities:    [] (94783 or 80503) Provided verbal/tactile cueing for activities related to improving balance, coordination, kinesthetic sense, posture, motor skill, proprioception and motor activation to allow for proper function of cervical, scapular, scapulothoracic and UE control with self care, carrying, lifting, driving/computer work.      Home Exercise Program:    [x] (57325) Reviewed/Progressed HEP activities related to strengthening, flexibility, endurance, ROM of cervical, scapular, scapulothoracic and UE control with self care, reaching, carrying, lifting, house/yardwork, driving/computer work  [] (21314) Reviewed/Progressed HEP activities related to improving balance, coordination, kinesthetic sense, posture, motor skill, proprioception of cervical, scapular, scapulothoracic and UE control with self care, reaching, carrying, lifting, house/yardwork, driving/computer work      Manual Treatments:  PROM / STM / Oscillations-Mobs:  G-I, II, III, IV (PA's, Inf., Post.)  [x] (80813) Provided manual therapy to mobilize soft tissue/joints of cervical/CT, scapular GHJ and UE for the purpose of decreasing headache, modulating pain, promoting relaxation,  increasing ROM, reducing/eliminating soft tissue swelling/inflammation/restriction, improving soft tissue extensibility and allowing for proper ROM for normal function with self care, reaching, carrying, lifting, house/yardwork, driving/computer work     Charges:  Timed Code Treatment Minutes: 40   Total Treatment Minutes: 50     [] EVAL (LOW) 32931 (typically 20 minutes face-to-face)  [] EVAL (MOD) 19396 (typically 30 minutes face-to-face)  [] EVAL (HIGH) 34229 (typically 45 minutes face-to-face)  [] RE-EVAL     [x] VA(81902) x   1  [] Dry needle 1 or 2 Muscles (32626)  [x] NMR (98613) x   1  [] Dry needle 3+ Muscles (75125)  [x] Manual (69588) x  1   [] Ultrasound (80621) x  [] TA (31749) x     [] [facility] (20026[facility]  [] ES(attended) (54364)     [] ES (un) (40200):   [] Vasopump (05364) [] Ionto (25453)   [] Other:    GOALS:  Patient stated goal: \"To stop the pain. \"  [x]? Progressing: []? Met: []? Not Met: []? Adjusted     Therapist goals for Patient:   Short Term Goals: To be achieved in: 2 weeks  1. Independent in HEP and progression per patient tolerance, in order to prevent re-injury. []? Progressing: [x]? Met: []? Not Met: []? Adjusted  2. Patient will have a decrease in pain to facilitate improvement in movement, function, and ADLs as indicated by Functional Deficits. [x]? Progressing: []? Met: []? Not Met: []? Adjusted     Long Term Goals: To be achieved in: 6 weeks  1. Disability index score of 35% or less for the NDI to assist with reaching prior level of function. [x]? Progressing: []? Met: []? Not Met: []? Adjusted  2. Patient will demonstrate increased AROM to [facility] of cervical/thoracic spine to allow for proper joint functioning as indicated by patients Functional Deficits. []? Progressing: [x]? Met: []? Not Met: []? Adjusted  3. Patient will demonstrate an increase in postural awareness and control and activation of  Deep cervical stabilizers to allow for proper functional mobility as indicated by patients Functional Deficits. [x]? Progressing: []? Met: []? Not Met: []? Adjusted  4. Patient will return to lifting 20 lbs to chest height without increased symptoms or restriction. [x]? Progressing: []? Met: []? Not Met: []? Adjusted  5. Pt will be able to look over his left shoulder without an increase in pain/symptoms. []? Progressing: [x]? Met: []? Not Met: []? Adjusted            ASSESSMENT:  Pt displays good tolerance to PT follow up appointment. Pt reported decreased stiffness in cervical/thoracic area after manual therapy treatment. L upper trap tightness decreasing. Pt has an appointment with an orthopedic specialist for further assessment/treatment of his neck pain. Treatment/Activity Tolerance:  [x] Patient tolerated treatment well [] Patient limited by fatique  [] Patient limited by pain  [] Patient limited by other medical complications  [] Other:     Overall Progression Towards Functional goals/ Treatment Progress Update:  [x] Patient is progressing as expected towards functional goals listed. [] Progression is slowed due to complexities/Impairments listed. [] Progression has been slowed due to co-morbidities.   [] Plan just implemented, too soon to assess goals progression <30days   [] Goals require adjustment due to lack of progress  [] Patient is not progressing as expected and requires additional follow up with physician  [] Other    Prognosis for POC: [x] Good [] Fair  [] Poor    Patient requires continued skilled intervention: [x] Yes  [] No        PLAN:   [x] Continue per plan of care [] Alter current plan (see comments)  [] Plan of care initiated [] Hold pending MD visit [] Discharge    Electronically signed by: Kavita Grove PT       Note: If patient does not return for scheduled/recommended follow up visits, this note will serve as a discharge from care along with the most recent update on progress.

## 2022-01-21 ENCOUNTER — HOSPITAL ENCOUNTER (OUTPATIENT)
Dept: PHYSICAL THERAPY | Age: 62
Setting detail: THERAPIES SERIES
Discharge: HOME | End: 2022-01-21
Payer: MEDICARE

## 2022-01-21 DIAGNOSIS — I10 ESSENTIAL HYPERTENSION: ICD-10-CM

## 2022-01-21 PROCEDURE — 97140 MANUAL THERAPY 1/> REGIONS: CPT

## 2022-01-21 PROCEDURE — 97110 THERAPEUTIC EXERCISES: CPT

## 2022-01-21 PROCEDURE — 97112 NEUROMUSCULAR REEDUCATION: CPT

## 2022-01-21 NOTE — PROGRESS NOTES
[place]  Phone: [phone]   Fax:     [phone]    Physical Therapy Treatment Note/ Progress Report:     Date:  2022    Patient Name:  Roopa Israel    :  1960  MRN: [id number]    Pertinent Medical History:  HTN, hyperlipidemia, depression, back pain    Medical/Treatment Diagnosis Information:  · Diagnosis: M54.2 - Cervicalgia, M50.30 - DDD, cervical  · Treatment Diagnosis: Decreased mobility and pain. Insurance/Certification information:  [employer]na Medicare  Physician Information:  Fabiene Ganser   Plan of care signed (Y/N): sent  (received) - Progress note from 21 signed for approval of dry needling     Date of Patient follow up with Physician:      Progress Report: []  Yes  [x]  No     Date Range for reporting period:  Beginnin21  Ending:     Progress report due (10 Rx/or 30 days whichever is less):      Recertification due (POC duration/ or 90 days whichever is less):     Visit # Insurance/POC Allowable Auth Needed   13 BOMN []Yes    [x]No     Functional Outcomes Measure:    Date Assessed: at RE (22)  Test: NDI  Score: 27 = 54%     At eval (21) = 23 = 46%    Pain level:  Not assessed today/10     SUBJECTIVE:     21: Pt reports, \"My neck is feeling about the same, same days good, same days bad. \" He has a follow up with his physician in January. 21: Patient reports neck is about the same. States he is having MRI done on 21.   22: Pt reports, \"Things are feeling sore. I get an MRI tomorrow on my neck. \"    22: Pt states he had an MRI on Saturday. MRI impression/results below. He has an appointment with a specialist on Tuesday, . He is continuing to have pain/achiness on the L side  22: Pt reports, \"my neck is feeling about the same. \" He states the pain is along his L upper trap region. He has a doctors appointment today after his PT appointment. 1/21/22: Pt reports, \"I might have to have surgery. \" He states he is going to call today to make an appointment with a surgeon. OBJECTIVE:    Observation:    Test measurements:     MRI Impression: Extensive multilevel spinal stenosis with superimposed disc herniation at C7-T1 as detailed above.       CERV ROM       Cervical Flexion 75%, pulling     Cervical Extension 50%       Left Right   Cervical SB 50% 50%, pulling    Cervical rotation 75% 75%, pain   UE Strength  Left Right   Shoulder Flex 4/5, pain 4/5   Shoulder Scap 4/5, pain 4/5   Shoulder ABd (C5 Axillary) 4/5, pain 4/5   Shoulder ER  4/5 4/5   Shoulder IR 4/5 4/5   Elbow Flex (C5 Musc) 5/5 5/5   Elbow Ext (C7 Radial) 5/5 5/5   Wrist Flex (C6 Radial) 5/5 5/5   Wrist Ext (C7 Radial) 5/5 5/5   APB (T1 Median) 5/5 5/5                   RESTRICTIONS/PRECAUTIONS: None    Exercises/Interventions:   Therapeutic Ex (45383)  Min: 10' Resistance/Repetitions Notes   LS stretch   UT stretch     Doorway Pec Str 3x30\" Good suraj. T-Slide      Rows      Ext      ER      IR      Horiz Abd     2x15, green  2x15, green  2x10, green   Good suraj. Both sides  Both Sides  Palms up   BL ER (t-band) Red, 2x15 fatigues   Seated Thoracic Rot x10 ea direction Hand to opposite knee             Manual Intervention (22117)  Min: 20'      Cerv mobs/manip Grade 3 sideglides x5 mins L C5-7    Thoracic mobs/manip Grade 3 upper thoracic PA glides x5 mins    CT manip     Rib mobilizations     DTM L UT, LS x10 mins Seated        NMR re-education (91786)  Min: 10'     Chin Tucks  10x 3 sec holds L and R, good suraj. Scap retractraion Good suraj. Scaption 4#, 2x10 Good suraj.    SA Wall Slides w/ chin tuck 2x10    Therapeutic Activity (46235)  Min:               Modalities  Min: 10'     MHP 10 min Cervical/L shoulder          Other Therapeutic Activities:  Pt was educated on PT POC, Diagnosis, Prognosis, pathomechanics as well as frequency and duration of scheduling future physical therapy appointments. Time was also taken on this day to answer all patient questions and participation in PT. Reviewed appointment policy in detail with patient and patient verbalized understanding. Home Exercise Program:    Therapeutic Exercise and NMR EXR  [x] (38332) Provided verbal/tactile cueing for activities related to strengthening, flexibility, endurance, ROM  for improvements in cervical, postural, scapular, scapulothoracic and UE control with self care, reaching, carrying, lifting, house/yardwork, driving/computer work. [x] (54995) Provided verbal/tactile cueing for activities related to improving balance, coordination, kinesthetic sense, posture, motor skill, proprioception  to assist with cervical, scapular, scapulothoracic and UE control with self care, reaching, carrying, lifting, house/yardwork, driving/computer work. Therapeutic Activities:    [] (10013 or 54924) Provided verbal/tactile cueing for activities related to improving balance, coordination, kinesthetic sense, posture, motor skill, proprioception and motor activation to allow for proper function of cervical, scapular, scapulothoracic and UE control with self care, carrying, lifting, driving/computer work.      Home Exercise Program:    [x] (33432) Reviewed/Progressed HEP activities related to strengthening, flexibility, endurance, ROM of cervical, scapular, scapulothoracic and UE control with self care, reaching, carrying, lifting, house/yardwork, driving/computer work  [] (45600) Reviewed/Progressed HEP activities related to improving balance, coordination, kinesthetic sense, posture, motor skill, proprioception of cervical, scapular, scapulothoracic and UE control with self care, reaching, carrying, lifting, house/yardwork, driving/computer work      Manual Treatments:  PROM / STM / Oscillations-Mobs:  G-I, II, III, IV (PA's, Inf., Post.)  [x] (34990) Provided Patient will demonstrate an increase in postural awareness and control and activation of  Deep cervical stabilizers to allow for proper functional mobility as indicated by patients Functional Deficits. [x]? Progressing: []? Met: []? Not Met: []? Adjusted  4. Patient will return to lifting 20 lbs to chest height without increased symptoms or restriction. [x]? Progressing: []? Met: []? Not Met: []? Adjusted  5. Pt will be able to look over his left shoulder without an increase in pain/symptoms. []? Progressing: [x]? Met: []? Not Met: []? Adjusted            ASSESSMENT:  Pt displays good tolerance to PT follow up appointment. Pt reported decreased stiffness in cervical/thoracic area after manual therapy treatment. L upper trap tightness decreasing. Pt has an appointment with an orthopedic specialist for further assessment/treatment of his neck pain. Treatment/Activity Tolerance:  [x] Patient tolerated treatment well [] Patient limited by fatique  [] Patient limited by pain  [] Patient limited by other medical complications  [] Other:     Overall Progression Towards Functional goals/ Treatment Progress Update:  [] Patient is progressing as expected towards functional goals listed. [] Progression is slowed due to complexities/Impairments listed. [] Progression has been slowed due to co-morbidities.   [] Plan just implemented, too soon to assess goals progression <30days   [] Goals require adjustment due to lack of progress   [x] Patient is not progressing as expected and requires additional follow up with physician  [] Other    Prognosis for POC: [x] Good [] Fair  [] Poor    Patient requires continued skilled intervention: [x] Yes  [] No        PLAN:   [] Continue per plan of care [] Alter current plan (see comments)  [] Plan of care initiated [x] Hold pending MD visit [] Discharge    Electronically signed by: Nash Smith PT       Note: If patient does not return for scheduled/recommended follow up visits, this note will serve as a discharge from care along with the most recent update on progress.

## 2022-01-21 NOTE — TELEPHONE ENCOUNTER
Medication:   Requested Prescriptions     Pending Prescriptions Disp Refills    lisinopril (PRINIVIL;ZESTRIL) 40 MG tablet 90 tablet 1     Sig: Take 1 tablet by mouth daily        Last Filled:      Patient Phone Number: [phone] (home)     Last appt: 1/3/2022   Next appt: 1/24/2022    Last OARRS:   RX Monitoring 1/16/2019   Attestation The Prescription Monitoring Report for this patient was reviewed today.    Periodic Controlled Substance Monitoring -

## 2022-01-23 RX ORDER — LISINOPRIL 40 MG/1
40 TABLET ORAL DAILY
Qty: 90 TABLET | Refills: 1 | Status: SHIPPED | OUTPATIENT
Start: 2022-01-23 | End: 2022-01-24 | Stop reason: SDUPTHER

## 2022-01-24 ENCOUNTER — OFFICE VISIT (OUTPATIENT)
Dept: PRIMARY CARE CLINIC | Age: 62
End: 2022-01-24
Payer: MEDICARE

## 2022-01-24 VITALS
HEIGHT: 72 IN | BODY MASS INDEX: 30.04 KG/M2 | DIASTOLIC BLOOD PRESSURE: 80 MMHG | HEART RATE: 87 BPM | WEIGHT: 221.8 LBS | SYSTOLIC BLOOD PRESSURE: 130 MMHG | TEMPERATURE: 97.1 F | OXYGEN SATURATION: 98 %

## 2022-01-24 DIAGNOSIS — R73.03 PRE-DIABETES: ICD-10-CM

## 2022-01-24 DIAGNOSIS — E78.00 PURE HYPERCHOLESTEROLEMIA: ICD-10-CM

## 2022-01-24 DIAGNOSIS — M10.9 GOUTY ARTHRITIS: ICD-10-CM

## 2022-01-24 DIAGNOSIS — I10 PRIMARY HYPERTENSION: Primary | ICD-10-CM

## 2022-01-24 DIAGNOSIS — M50.20 CERVICAL DISC HERNIATION: ICD-10-CM

## 2022-01-24 PROBLEM — F19.11 H/O: SUBSTANCE ABUSE (HCC): Status: RESOLVED | Noted: 2018-12-14 | Resolved: 2022-01-24

## 2022-01-24 PROCEDURE — 99214 OFFICE O/P EST MOD 30 MIN: CPT | Performed by: FAMILY MEDICINE

## 2022-01-24 RX ORDER — LISINOPRIL 40 MG/1
40 TABLET ORAL DAILY
Qty: 90 TABLET | Refills: 1 | Status: SHIPPED | OUTPATIENT
Start: 2022-01-24

## 2022-01-24 RX ORDER — ATORVASTATIN CALCIUM 20 MG/1
20 TABLET, FILM COATED ORAL DAILY
Qty: 90 TABLET | Refills: 1 | Status: SHIPPED | OUTPATIENT
Start: 2022-01-24 | End: 2022-07-25 | Stop reason: SDUPTHER

## 2022-01-24 RX ORDER — AMLODIPINE BESYLATE 10 MG/1
10 TABLET ORAL DAILY
Qty: 90 TABLET | Refills: 1 | Status: SHIPPED | OUTPATIENT
Start: 2022-01-24

## 2022-01-24 RX ORDER — ALLOPURINOL 300 MG/1
300 TABLET ORAL DAILY
Qty: 90 TABLET | Refills: 1 | Status: SHIPPED | OUTPATIENT
Start: 2022-01-24

## 2022-01-24 NOTE — PATIENT INSTRUCTIONS
Patient Education        Low Sodium Diet (2,000 Milligram): Care Instructions  Overview     Limiting sodium can be an important part of managing some health problems. The most common source of sodium is salt. People get most of the salt in their diet from canned, prepared, and packaged foods. Fast food and restaurant meals also are very high in sodium. Your doctor will probably limit your sodium to less than 2,000 milligrams (mg) a day. This limit counts all the sodium in prepared and packaged foods and any salt you add to your food. Follow-up care is a key part of your treatment and safety. Be sure to make and go to all appointments, and call your doctor if you are having problems. It's also a good idea to know your test results and keep a list of the medicines you take. How can you care for yourself at home? Read food labels  · Read labels on cans and food packages. The labels tell you how much sodium is in each serving. Make sure that you look at the serving size. If you eat more than the serving size, you have eaten more sodium. · Food labels also tell you the Percent Daily Value for sodium. Choose products with low Percent Daily Values for sodium. · Be aware that sodium can come in forms other than salt, including monosodium glutamate (MSG), sodium citrate, and sodium bicarbonate (baking soda). MSG is often added to Asian food. When you eat out, you can sometimes ask for food without MSG or added salt. Buy low-sodium foods  · Buy foods that are labeled \"unsalted\" (no salt added), \"sodium-free\" (less than 5 mg of sodium per serving), or \"low-sodium\" (140 mg or less of sodium per serving). Foods labeled \"reduced-sodium\" and \"light sodium\" may still have too much sodium. Be sure to read the label to see how much sodium you are getting. · Buy fresh vegetables, or frozen vegetables without added sauces. Buy low-sodium versions of canned vegetables, soups, and other canned goods.   Prepare low-sodium meals  · Cut back on the amount of salt you use in cooking. This will help you adjust to the taste. Do not add salt after cooking. One teaspoon of salt has about 2,300 mg of sodium. · Take the salt shaker off the table. · Flavor your food with garlic, lemon juice, onion, vinegar, herbs, and spices. Do not use soy sauce, lite soy sauce, steak sauce, onion salt, garlic salt, celery salt, or ketchup on your food. · Use low-sodium salad dressings, sauces, and ketchup. Or make your own salad dressings and sauces without adding salt. · Use less salt (or none) when recipes call for it. You can often use half the salt a recipe calls for without losing flavor. Other foods such as rice, pasta, and grains do not need added salt. · Rinse canned vegetables, and cook them in fresh water. This removes some--but not all--of the salt. · Avoid water that is naturally high in sodium or that has been treated with water softeners, which add sodium. If you buy bottled water, read the label and choose a sodium-free brand. Avoid high-sodium foods  · Avoid eating:  ? Smoked, cured, salted, and canned meat, fish, and poultry. ? Ham, read, hot dogs, and luncheon meats. ? Regular, hard, and processed cheese and regular peanut butter. ? Crackers with salted tops, and other salted snack foods such as pretzels, chips, and salted popcorn. ? Frozen prepared meals, unless labeled low-sodium. ? Canned and dried soups, broths, and bouillon, unless labeled sodium-free or low-sodium. ? Canned vegetables, unless labeled sodium-free or low-sodium. ? [place] fries, pizza, tacos, and other fast foods. ? Pickles, olives, ketchup, and other condiments, especially soy sauce, unless labeled sodium-free or low-sodium. Where can you learn more? Go to https://yoni.healthJotky. org and sign in to your [employer] account.  Enter T899 in the [facility] box to learn more about \"Low Sodium Diet (2,000 Milligram): Care Instructions. \"     If you do not have an account, please click on the \"Sign Up Now\" link. Current as of: September 8, 2021               Content Version: 13.1  © 2006-2021 Healthwise, Incorporated. Care instructions adapted under license by [facility] [place] ([facility]). If you have questions about a medical condition or this instruction, always ask your healthcare professional. [address] any warranty or liability for your use of this information. Patient Education        Learning About Low-Sodium Foods  What foods are low in sodium? The foods you eat contain nutrients, such as vitamins and minerals. Sodium is a nutrient. Your body needs the right amount to stay healthy and work as it should. You can use the list below to help you make choices about which foods to eat. Fruits  · Fresh, frozen, canned, or dried fruit  Vegetables  · Fresh or frozen vegetables, with no added salt  · Canned vegetables, low-sodium or with no added salt  Grains  · Bagels without salted tops  · Cereal with no added salt  · Corn tortillas  · Crackers with no added salt  · Oatmeal, cooked without salt  · Popcorn with no salt  · Pasta and noodles, cooked without salt  · Rice, cooked without salt  · Unsalted pretzels  Dairy and dairy alternatives  · Butter, unsalted  · Cream cheese  · Ice cream  · Milk  · Soy milk  Meats and other protein foods  · Beans and peas, canned with no salt  · Eggs  · Fresh fish (not smoked)  · Fresh meats (not smoked or cured)  · Nuts and nut butter, prepared without salt  · Poultry, not packaged with sodium solution  · Tofu, unseasoned  · Tuna, canned without salt  Seasonings  · Garlic  · Herbs and spices  · Lemon juice  · Mustard  · Olive oil  · Salt-free seasoning mixes  · Vinegar  Work with your doctor to find out how much of this nutrient you need. Depending on your health, you may need more or less of it in your diet. Where can you learn more?   Go to https://chpepiceweb.[email or web address] org and sign in to your [employer] account. Enter M596 in the [facility] box to learn more about \"Learning About Low-Sodium Foods. \"     If you do not have an account, please click on the \"Sign Up Now\" link. Current as of: September 8, 2021               Content Version: 13.1  © 2006-2021 [employer] ([employer]). Care instructions adapted under license by [facility] ([facility]). If you have questions about a medical condition or this instruction, always ask your healthcare professional. [place] [address] any warranty or liability for your use of this information. Patient Education        How to Read a Food Label to Limit Sodium: Care Instructions  Overview  Limiting sodium can be an important part of managing some health problems. Processed foods, fast food, and restaurant foods are the major sources of dietary sodium. The most common name for sodium is salt. Most packaged foods have a Nutrition Facts label. This will tell you how much sodium is in one serving of food. Follow-up care is a key part of your treatment and safety. Be sure to make and go to all appointments, and call your doctor if you are having problems. It's also a good idea to know your test results and keep a list of the medicines you take. How can you care for yourself at home? Read ingredient lists on food labels  · Read the list of ingredients on food labels to help you find how much sodium is in a food. The label lists the ingredients in a food in descending order (from the most to the least). If salt or sodium is high on the list, there may be a lot of sodium in the food. · Know that sodium has different names. Sodium is also called monosodium glutamate (MSG), sodium citrate, sodium alginate, and sodium phosphate. Read Nutrition Facts labels  · On most foods, there is a Nutrition Facts label. This will tell you how much sodium is in one serving of food.  Look at both the serving size and the sodium amount. The serving size is located at the top of the label, usually right under the \"Nutrition Facts\" title. The amount of sodium is given in the list under the title. It is given in milligrams (mg). ? Check the serving size carefully. A single serving is often very small, and you may eat more than one serving. If this is the case, you will eat more sodium than listed on the label. For example, if the serving size for a canned soup is 1 cup and the sodium amount is 470 mg, if you have 2 cups you will eat 940 mg of sodium. · The nutrition facts for fresh fruits and vegetables are not listed on the food. They may be listed somewhere in the store. These foods usually have no sodium or low sodium. · The Nutrition Facts label also gives you the Percent Daily Value for sodium. This is how much of the recommended amount of sodium a serving contains. The daily value for sodium is 2,300 mg. So if the Percent Daily Value says 50%, this means one serving is giving you half of this, or 1,150 mg. Buy low-sodium foods  · Look for foods that are made with less sodium. Watch for the following words on the label. ? \"Unsalted\" means there is no sodium added to the food. But there may be sodium already in the food naturally. ? \"Sodium-free\" means a serving has less than 5 mg of sodium. ? \"Very low sodium\" means a serving has 35 mg or less of sodium. ? \"Low-sodium\" means a serving has 140 mg or less of sodium. · \"Reduced-sodium\" means that there is 25% less sodium than what the food normally has. This is still usually too much sodium. · Buy fresh vegetables, or frozen vegetables without added sauces. Buy low-sodium versions of canned vegetables, soups, and other canned goods. Where can you learn more? Go to https://yoni.dBMEDx. org and sign in to your [employer] account.  Enter 26 [id number] in the [facility] box to learn more about \"How to Read a Food Label to Limit Sodium: Care Instructions. \"     If you do not have an account, please click on the \"Sign Up Now\" link. Current as of: September 8, 2021               Content Version: 13.1  © 2006-2021 Healthwise, Incorporated. Care instructions adapted under license by [facility] ([facility]). If you have questions about a medical condition or this instruction, always ask your healthcare professional. [address] any warranty or liability for your use of this information. Patient Education        DASH Diet: Care Instructions  Your Care Instructions     The DASH diet is an eating plan that can help lower your blood pressure. DASH stands for Dietary Approaches to Stop Hypertension. Hypertension is high blood pressure. The DASH diet focuses on eating foods that are high in calcium, potassium, and magnesium. These nutrients can lower blood pressure. The foods that are highest in these nutrients are fruits, vegetables, low-fat dairy products, nuts, seeds, and legumes. But taking calcium, potassium, and magnesium supplements instead of eating foods that are high in those nutrients does not have the same effect. The DASH diet also includes whole grains, fish, and poultry. The DASH diet is one of several lifestyle changes your doctor may recommend to lower your high blood pressure. Your doctor may also want you to decrease the amount of sodium in your diet. Lowering sodium while following the DASH diet can lower blood pressure even further than just the DASH diet alone. Follow-up care is a key part of your treatment and safety. Be sure to make and go to all appointments, and call your doctor if you are having problems. It's also a good idea to know your test results and keep a list of the medicines you take. How can you care for yourself at home? Following the DASH diet  · Eat 4 to 5 servings of fruit each day.  A serving is 1 medium-sized piece of fruit, ½ cup chopped or canned fruit, 1/4 cup dried fruit, or 4 ounces (½ cup) of fruit juice. Choose fruit more often than fruit juice. · Eat 4 to 5 servings of vegetables each day. A serving is 1 cup of lettuce or raw leafy vegetables, ½ cup of chopped or cooked vegetables, or 4 ounces (½ cup) of vegetable juice. Choose vegetables more often than vegetable juice. · Get 2 to 3 servings of low-fat and fat-free dairy each day. A serving is 8 ounces of milk, 1 cup of yogurt, or 1 ½ ounces of cheese. · Eat 6 to 8 servings of grains each day. A serving is 1 slice of bread, 1 ounce of dry cereal, or ½ cup of cooked rice, pasta, or cooked cereal. Try to choose whole-grain products as much as possible. · Limit lean meat, poultry, and fish to 2 servings each day. A serving is 3 ounces, about the size of a deck of cards. · Eat 4 to 5 servings of nuts, seeds, and legumes (cooked dried beans, lentils, and split peas) each week. A serving is 1/3 cup of nuts, 2 tablespoons of seeds, or ½ cup of cooked beans or peas. · Limit fats and oils to 2 to 3 servings each day. A serving is 1 teaspoon of vegetable oil or 2 tablespoons of salad dressing. · Limit sweets and added sugars to 5 servings or less a week. A serving is 1 tablespoon jelly or jam, ½ cup sorbet, or 1 cup of lemonade. · Eat less than 2,300 milligrams (mg) of sodium a day. If you limit your sodium to 1,500 mg a day, you can lower your blood pressure even more. · Be aware that all of these are the suggested number of servings for people who eat 1,800 to 2,000 calories a day. Your recommended number of servings may be different if you need more or fewer calories. Tips for success  · Start small. Do not try to make dramatic changes to your diet all at once. You might feel that you are missing out on your favorite foods and then be more likely to not follow the plan. Make small changes, and stick with them. Once those changes become habit, add a few more changes. · Try some of the following:  ?  Make it a goal to eat a fruit or vegetable at every meal and at snacks. This will make it easy to get the recommended amount of fruits and vegetables each day. ? Try yogurt topped with fruit and nuts for a snack or healthy dessert. ? Add lettuce, tomato, cucumber, and onion to sandwiches. ? Combine a ready-made pizza crust with low-fat mozzarella cheese and lots of vegetable toppings. Try using tomatoes, squash, spinach, broccoli, carrots, cauliflower, and onions. ? Have a variety of cut-up vegetables with a low-fat dip as an appetizer instead of chips and dip. ? Sprinkle sunflower seeds or chopped almonds over salads. Or try adding chopped walnuts or almonds to cooked vegetables. ? Try some vegetarian meals using beans and peas. Add garbanzo or kidney beans to salads. Make burritos and tacos with mashed farris beans or black beans. Where can you learn more? Go to https://UnowhyvelvetHALSCION.Movea. org and sign in to your Sp[employer] to Share account. Enter J761 in the [facility] box to learn more about \"DASH Diet: Care Instructions. \"     If you do not have an account, please click on the \"Sign Up Now\" link. Current as of: April 29, 2021               Content Version: 13.1  © 2997-0360 Healthwise, Incorporated. Care instructions adapted under license by [facility] ([facility]). If you have questions about a medical condition or this instruction, always ask your healthcare professional. [place] [address] any warranty or liability for your use of this information.     See neurosurgeon

## 2022-01-24 NOTE — PROGRESS NOTES
CC follow up  HPI  61 y/o male known htn and herniated cervical disc/left arm weakness    Hypertension  No chest pain, headache, sob, leg edema, stroke, kidney disease       The neck issue unchanged  He has seen Dr Guillermo Bamberger spine surgeon  Who referred his for neurosurgery consultation    Hyperlipidemia  No muscle aches or muscle weakness or cramps since last visit. Gouty arthritis  Mostly of the left wrist area  No recent attack      Lab Results   Component Value Date    WBC 5.4 09/27/2021    HGB 14.2 09/27/2021    HCT 44.0 09/27/2021    MCV 84.1 09/27/2021     09/27/2021     Lab Results   Component Value Date    LABURIC 8.9 (H) 09/27/2021     Lab Results   Component Value Date    CHOL 172 11/12/2019    CHOL 115 09/16/2015    CHOL 122 11/04/2014     Lab Results   Component Value Date    TRIG 139 11/12/2019    TRIG 179 (H) 09/16/2015    TRIG 179 (H) 11/04/2014     Lab Results   Component Value Date    HDL 40 09/27/2021    HDL 42 11/12/2019    HDL 35 (L) 09/16/2015     Lab Results   Component Value Date    LDLCALC 138 (H) 09/27/2021    LDLCALC 102 (H) 11/12/2019    LDLCALC 44 09/16/2015     Lab Results   Component Value Date    LABVLDL 33 09/27/2021    LABVLDL 28 11/12/2019    LABVLDL 36 09/16/2015     No results found for: [facility]  Lab Results   Component Value Date    CREATININE 0.9 09/27/2021    BUN 13 09/27/2021     09/27/2021    K 4.7 09/27/2021     09/27/2021    CO2 25 09/27/2021       1. Primary hypertension  Jarocho bp at goal  With current treatment  Medication refill amlodipine, lisinopril    2. Cervical disc herniation  Weakness left arm  He has referral to see neurosurgeon      3. Essential hypertension  bp is at goal  Recent renal  Low salt dit  - amLODIPine (NORVASC) 10 MG tablet; Take 1 tablet by mouth daily  Dispense: 90 tablet; Refill: 1  - lisinopril (PRINIVIL;ZESTRIL) 40 MG tablet; Take 1 tablet by mouth daily  Dispense: 90 tablet;  Refill: 1  - atorvastatin (LIPITOR) 20 MG tablet; Take 1 tablet by mouth daily To prevent heart disease  Dispense: 90 tablet; Refill: 1    4. Gouty arthritis  Uric acid up  No recent attack  Re order  - allopurinol (ZYLOPRIM) 300 MG tablet; Take 1 tablet by mouth daily For arthritis stop allopurinol 100 mg  Dispense: 90 tablet; Refill: 1      5. Prediabetes  AIC 5.9  At goal  Diet exerise    6. Pure hypercholesterolemia  Recent lipids noted  On statin for ascvd prevention  Low fat diet/exerise  - atorvastatin (LIPITOR) 20 MG tablet; Take 1 tablet by mouth daily To prevent heart disease  Dispense: 90 tablet;  Refill: 1

## 2022-01-28 ENCOUNTER — TELEPHONE (OUTPATIENT)
Dept: ORTHOPEDIC SURGERY | Age: 62
End: 2022-01-28

## 2022-01-31 ENCOUNTER — TELEPHONE (OUTPATIENT)
Dept: PRIMARY CARE CLINIC | Age: 62
End: 2022-01-31

## 2022-01-31 NOTE — TELEPHONE ENCOUNTER
----- Message from Juliana Zee sent at 1/26/2022  9:28 AM EST -----  Subject: Message to Provider    QUESTIONS  Information for Provider? Patient Sharen Osgood is returning a phone call   from Dr. Irwin Silverio' office . Please call him back anytime.  ---------------------------------------------------------------------------  --------------  CALL BACK INFO  What is the best way for the office to contact you? OK to leave message on   voicemail  Preferred Call Back Phone Number?  [phone]  ---------------------------------------------------------------------------  --------------  SCRIPT ANSWERS  undefined

## 2022-01-31 NOTE — TELEPHONE ENCOUNTER
----- Message from Liberty Myles sent at 1/26/2022  9:28 AM EST -----  Subject: Message to Provider    QUESTIONS  Information for Provider? Patient Cindy Villarreal is returning a phone call   from Dr. Wayne Thomas' office . Please call him back anytime.  ---------------------------------------------------------------------------  --------------  CALL BACK INFO  What is the best way for the office to contact you? OK to leave message on   voicemail  Preferred Call Back Phone Number?  [phone]  ---------------------------------------------------------------------------  --------------  SCRIPT ANSWERS  undefined

## 2022-02-01 ENCOUNTER — TELEPHONE (OUTPATIENT)
Dept: PRIMARY CARE CLINIC | Age: 62
End: 2022-02-01

## 2022-02-01 NOTE — TELEPHONE ENCOUNTER
----- Message from Erskine Shone sent at 2/1/2022 11:55 AM EST -----  Subject: Referral Request    QUESTIONS   Reason for referral request? pain management, surgery on neck, Dr. Danielle Arcos   Has the physician seen you for this condition before? No   Preferred Specialist (if applicable)? Do you already have an appointment scheduled? No  Additional Information for Provider? Patient spoke to this office today   and was told they still haven't received the referral. He says he has been   requesting this for two weeks. The office's fax number is [phone],   phone is [phone]. Please contact patient to advise.  ---------------------------------------------------------------------------  --------------  CALL BACK INFO  What is the best way for the office to contact you? OK to leave message on   voicemail, OK to respond with electronic message via [employer] portal (only   for patients who have registered [employer] account)  Preferred Call Back Phone Number?  [phone]

## 2022-02-08 ENCOUNTER — TELEPHONE (OUTPATIENT)
Dept: PRIMARY CARE CLINIC | Age: 62
End: 2022-02-08

## 2022-02-08 DIAGNOSIS — M96.1 POSTLAMINECTOMY SYNDROME, LUMBAR: ICD-10-CM

## 2022-02-08 DIAGNOSIS — M50.30 DDD (DEGENERATIVE DISC DISEASE), CERVICAL: ICD-10-CM

## 2022-02-08 DIAGNOSIS — M51.36 DDD (DEGENERATIVE DISC DISEASE), LUMBAR: ICD-10-CM

## 2022-02-08 DIAGNOSIS — M65.9 SYNOVITIS OF WRIST: ICD-10-CM

## 2022-02-08 DIAGNOSIS — R29.898 LEFT ARM WEAKNESS: ICD-10-CM

## 2022-02-08 DIAGNOSIS — G89.4 CHRONIC PAIN SYNDROME: Primary | ICD-10-CM

## 2022-02-08 NOTE — TELEPHONE ENCOUNTER
[facility] called in about Pt and she stated that they could not use the referral for Pt they would need a new referral  put in Epic for Pt it need to say [facility] or [facility] Fax to [phone] Please call if any Question @ [phone] ask for [facility] Please be Advise

## 2022-02-08 NOTE — TELEPHONE ENCOUNTER
Can you call and see what they want me to send , I did on the original order place the name of the neurosurgeon

## 2022-02-10 ENCOUNTER — TELEPHONE (OUTPATIENT)
Dept: PRIMARY CARE CLINIC | Age: 62
End: 2022-02-10

## 2022-02-10 NOTE — TELEPHONE ENCOUNTER
Suzanne called from [address] they would like a new referral sent to them the referral has to just have Dr Yari Torres name on this referral from Dr Yari Torres but [facility] stated that it can not have Dr Shelley Ontiveros name on it Please call [facility] @ [phone] please be advise

## 2022-02-11 NOTE — TELEPHONE ENCOUNTER
Please check with Carrie Campbell as I spoke to her about this on Wednesday, if I need to generate a new referral, please let me know

## 2022-02-25 ENCOUNTER — TELEPHONE (OUTPATIENT)
Dept: PRIMARY CARE CLINIC | Age: 62
End: 2022-02-25

## 2022-02-25 NOTE — TELEPHONE ENCOUNTER
----- Message from Katrin Earl sent at 2/24/2022 12:34 PM EST -----  Subject: Message to Provider    QUESTIONS  Information for Provider? PT would like an update on the referral request   he is in need of sent on 2.1.22 PT is in need of a referral for his neck. Fax number for the order is [phone]   ---------------------------------------------------------------------------  --------------  CALL BACK INFO  What is the best way for the office to contact you? OK to leave message on   voicemail  Preferred Call Back Phone Number? [phone]  ---------------------------------------------------------------------------  --------------  SCRIPT ANSWERS  Relationship to Patient?  Self

## 2022-03-02 ENCOUNTER — TELEPHONE (OUTPATIENT)
Dept: PRIMARY CARE CLINIC | Age: 62
End: 2022-03-02

## 2022-03-02 NOTE — TELEPHONE ENCOUNTER
----- Message from Saúl Najera sent at 3/2/2022  2:10 PM EST -----  Subject: Referral Request    QUESTIONS   Reason for referral request? Loudon Wicho has been trying to get a referral   for Neurosurgery Dr Sim Howard since Jan 18,2022. In Epic it looks   like it was sent by Dr Shannan Ibanez office but they still said on 3-2-2002   they have never received it. Please call him back. Has the physician seen you for this condition before? No   Preferred Specialist (if applicable)? Do you already have an appointment scheduled? No  Additional Information for Provider? Still waiting on referral  ---------------------------------------------------------------------------  --------------  CALL BACK INFO  What is the best way for the office to contact you? OK to leave message on   voicemail  Preferred Call Back Phone Number?  [phone]

## 2022-04-07 ENCOUNTER — TELEPHONE (OUTPATIENT)
Dept: PRIMARY CARE CLINIC | Age: 62
End: 2022-04-07

## 2022-06-02 ENCOUNTER — HOSPITAL ENCOUNTER (EMERGENCY)
Age: 62
Discharge: HOME | End: 2022-06-02
Attending: EMERGENCY MEDICINE
Payer: MEDICARE

## 2022-06-02 VITALS
HEIGHT: 77 IN | TEMPERATURE: 97.5 F | SYSTOLIC BLOOD PRESSURE: 130 MMHG | HEART RATE: 74 BPM | WEIGHT: 204.37 LBS | RESPIRATION RATE: 16 BRPM | OXYGEN SATURATION: 98 % | BODY MASS INDEX: 24.13 KG/M2 | DIASTOLIC BLOOD PRESSURE: 76 MMHG

## 2022-06-02 DIAGNOSIS — H10.33 ACUTE BACTERIAL CONJUNCTIVITIS OF BOTH EYES: Primary | ICD-10-CM

## 2022-06-02 PROCEDURE — 99283 EMERGENCY DEPT VISIT LOW MDM: CPT

## 2022-06-02 RX ORDER — CIPROFLOXACIN HYDROCHLORIDE 3.5 MG/ML
1 SOLUTION/ DROPS TOPICAL 4 TIMES DAILY
Qty: 2.5 ML | Refills: 0 | Status: SHIPPED | OUTPATIENT
Start: 2022-06-02 | End: 2022-06-09

## 2022-06-02 ASSESSMENT — VISUAL ACUITY
OU: 20/25
OS: 20/25
OD: 20/25

## 2022-06-02 ASSESSMENT — PAIN - FUNCTIONAL ASSESSMENT: PAIN_FUNCTIONAL_ASSESSMENT: NONE - DENIES PAIN
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
## 2022-06-02 NOTE — Clinical Note
Jaiden Madrigal was seen and treated in our emergency department on 6/2/2022. He may return to work on 06/04/2022. No restrictions     If you have any questions or concerns, please don't hesitate to call.       Dann Boss, DO

## 2022-06-02 NOTE — ED NOTES
Discharge and education instructions reviewed. Patient verbalized understanding, teach-back successful. Patient denied questions at this time. No acute distress noted. Patient instructed to follow-up as noted - return to emergency department if symptoms worsen. Patient verbalized understanding. Discharged per EDMD with discharge instructions.           Sly Neal RN  06/02/22 0876

## 2022-06-17 PROBLEM — M48.02 SPINAL STENOSIS, CERVICAL REGION: Status: ACTIVE | Noted: 2022-04-12

## 2022-06-21 ENCOUNTER — OFFICE VISIT (OUTPATIENT)
Dept: PRIMARY CARE CLINIC | Age: 62
End: 2022-06-21
Payer: MEDICARE

## 2022-06-21 VITALS
SYSTOLIC BLOOD PRESSURE: 162 MMHG | DIASTOLIC BLOOD PRESSURE: 93 MMHG | HEIGHT: 77 IN | WEIGHT: 207 LBS | HEART RATE: 58 BPM | TEMPERATURE: 97 F | BODY MASS INDEX: 24.44 KG/M2 | OXYGEN SATURATION: 98 %

## 2022-06-21 DIAGNOSIS — I10 PRIMARY HYPERTENSION: ICD-10-CM

## 2022-06-21 DIAGNOSIS — Z01.818 PRE-OP EXAM: ICD-10-CM

## 2022-06-21 DIAGNOSIS — Z01.818 PRE-OP EXAM: Primary | ICD-10-CM

## 2022-06-21 LAB
ANION GAP SERPL CALCULATED.3IONS-SCNC: 14 MMOL/L (ref 3–16)
APTT: 34.9 SEC (ref 23–34.3)
AST SERPL-CCNC: 15 U/L (ref 15–37)
BUN BLDV-MCNC: 12 MG/DL (ref 7–20)
CALCIUM SERPL-MCNC: 9.5 MG/DL (ref 8.3–10.6)
CHLORIDE BLD-SCNC: 102 MMOL/L (ref 99–110)
CHOLESTEROL, FASTING: 188 MG/DL (ref 0–199)
CO2: 23 MMOL/L (ref 21–32)
CREAT SERPL-MCNC: 1 MG/DL (ref 0.8–1.3)
GFR AFRICAN AMERICAN: >60
GFR NON-AFRICAN AMERICAN: >60
GLUCOSE BLD-MCNC: 88 MG/DL (ref 70–99)
HCT VFR BLD CALC: 38.6 % (ref 40.5–52.5)
HDLC SERPL-MCNC: 31 MG/DL (ref 40–60)
HEMOGLOBIN: 12.7 G/DL (ref 13.5–17.5)
INR BLD: 1 (ref 0.87–1.14)
LDL CHOLESTEROL CALCULATED: ABNORMAL MG/DL
LDL CHOLESTEROL DIRECT: 85 MG/DL
MCH RBC QN AUTO: 26.4 PG (ref 26–34)
MCHC RBC AUTO-ENTMCNC: 32.9 G/DL (ref 31–36)
MCV RBC AUTO: 80.3 FL (ref 80–100)
PDW BLD-RTO: 15.2 % (ref 12.4–15.4)
PLATELET # BLD: 215 K/UL (ref 135–450)
PMV BLD AUTO: 8.4 FL (ref 5–10.5)
POTASSIUM SERPL-SCNC: 4.5 MMOL/L (ref 3.5–5.1)
PROTHROMBIN TIME: 13 SEC (ref 11.7–14.5)
RBC # BLD: 4.8 M/UL (ref 4.2–5.9)
SODIUM BLD-SCNC: 139 MMOL/L (ref 136–145)
TRIGLYCERIDE, FASTING: 308 MG/DL (ref 0–150)
VLDLC SERPL CALC-MCNC: ABNORMAL MG/DL
WBC # BLD: 4.7 K/UL (ref 4–11)

## 2022-06-21 PROCEDURE — 93000 ELECTROCARDIOGRAM COMPLETE: CPT | Performed by: FAMILY MEDICINE

## 2022-06-21 PROCEDURE — 99214 OFFICE O/P EST MOD 30 MIN: CPT | Performed by: FAMILY MEDICINE

## 2022-06-21 ASSESSMENT — ENCOUNTER SYMPTOMS
SINUS PRESSURE: 0
WHEEZING: 0
TROUBLE SWALLOWING: 0
CONSTIPATION: 0
ANAL BLEEDING: 0
EYE REDNESS: 0
FACIAL SWELLING: 0
CHOKING: 0
ABDOMINAL PAIN: 0
EYE ITCHING: 0
EYE PAIN: 0
APNEA: 0
COUGH: 0
PHOTOPHOBIA: 0
BACK PAIN: 0
SHORTNESS OF BREATH: 0
VOICE CHANGE: 0
NAUSEA: 0
VOMITING: 0
COLOR CHANGE: 0
RECTAL PAIN: 0
CHEST TIGHTNESS: 0
SORE THROAT: 0
BLOOD IN STOOL: 0
EYE DISCHARGE: 0

## 2022-06-21 ASSESSMENT — PATIENT HEALTH QUESTIONNAIRE - PHQ9
2. FEELING DOWN, DEPRESSED OR HOPELESS: 0
1. LITTLE INTEREST OR PLEASURE IN DOING THINGS: 0
7. TROUBLE CONCENTRATING ON THINGS, SUCH AS READING THE NEWSPAPER OR WATCHING TELEVISION: 0
3. TROUBLE FALLING OR STAYING ASLEEP: 1
8. MOVING OR SPEAKING SO SLOWLY THAT OTHER PEOPLE COULD HAVE NOTICED. OR THE OPPOSITE, BEING SO FIGETY OR RESTLESS THAT YOU HAVE BEEN MOVING AROUND A LOT MORE THAN USUAL: 0
9. THOUGHTS THAT YOU WOULD BE BETTER OFF DEAD, OR OF HURTING YOURSELF: 0
SUM OF ALL RESPONSES TO PHQ QUESTIONS 1-9: 2
SUM OF ALL RESPONSES TO PHQ QUESTIONS 1-9: 2
4. FEELING TIRED OR HAVING LITTLE ENERGY: 1
SUM OF ALL RESPONSES TO PHQ9 QUESTIONS 1 & 2: 0
6. FEELING BAD ABOUT YOURSELF - OR THAT YOU ARE A FAILURE OR HAVE LET YOURSELF OR YOUR FAMILY DOWN: 0
10. IF YOU CHECKED OFF ANY PROBLEMS, HOW DIFFICULT HAVE THESE PROBLEMS MADE IT FOR YOU TO DO YOUR WORK, TAKE CARE OF THINGS AT HOME, OR GET ALONG WITH OTHER PEOPLE: 0
SUM OF ALL RESPONSES TO PHQ QUESTIONS 1-9: 2
SUM OF ALL RESPONSES TO PHQ QUESTIONS 1-9: 2
5. POOR APPETITE OR OVEREATING: 0

## 2022-06-21 NOTE — PROGRESS NOTES
CC pre op     HPI  64 y/o male PMH HTN GOUTY ARTHRITIS     He does have painful left neck and left arm weakness for about 2 years duration and seems to be getting worse. A MRI of cervical spine shows mltilevel spinal stenosis with disc herniation C 7-T1. He is  Scheduled for C4- T1 anterior cervical discectomy & fusion per Dr Josefa Pelayo. Saqib Joseph MD  6/30/2022. Medical clearance needed. He denies chest pain or heart disease. He denies kidney disease, stroke, diabetes. He does have hypertension  No chest pain, headache, sob, leg edema, stroke, kidney disease     Gouty arthritis  No recent flare    No personal or family history of issues  With general anesthesia. No bleeding issues or clots associated  With previous surgery in personal or family  History    No Known Allergies  Current Outpatient Medications   Medication Sig Dispense Refill    amLODIPine (NORVASC) 10 MG tablet Take 1 tablet by mouth daily 90 tablet 1    lisinopril (PRINIVIL;ZESTRIL) 40 MG tablet Take 1 tablet by mouth daily 90 tablet 1    allopurinol (ZYLOPRIM) 300 MG tablet Take 1 tablet by mouth daily For arthritis stop allopurinol 100 mg 90 tablet 1    atorvastatin (LIPITOR) 20 MG tablet Take 1 tablet by mouth daily To prevent heart disease 90 tablet 1    tiZANidine (ZANAFLEX) 4 MG tablet Take 1 tablet by mouth nightly as needed (prn) Muscle spasm  Neck pain 30 tablet 5     No current facility-administered medications for this visit.      Past Medical History:   Diagnosis Date    Arthritis     neck, back    Arthropathy     TISHA positive    Back pain     Depression     Gout     Hyperlipidemia     Hypertension     Wears dentures      Social History     Socioeconomic History    Marital status:      Spouse name: Not on file    Number of children: Not on file    Years of education: Not on file    Highest education level: Not on file   Occupational History    Occupation: Disabled     Comment: 2014   Tobacco Use    Smoking status: Never Smoker    Smokeless tobacco: Never Used   Vaping Use    Vaping Use: Never used   Substance and Sexual Activity    Alcohol use: No    Drug use: No    Sexual activity: Yes   Other Topics Concern    Not on file   Social History Narrative    Not on file     Social Determinants of Health     Financial Resource Strain: Low Risk     Difficulty of Paying Living Expenses: Not hard at all   Food Insecurity: No Food Insecurity    Worried About Running Out of Food in the Last Year: Never true    Cande of Food in the Last Year: Never true   Transportation Needs:     Lack of Transportation (Medical): Not on file    Lack of Transportation (Non-Medical):  Not on file   Physical Activity:     Days of Exercise per Week: Not on file    Minutes of Exercise per Session: Not on file   Stress:     Feeling of Stress : Not on file   Social Connections:     Frequency of Communication with Friends and Family: Not on file    Frequency of Social Gatherings with Friends and Family: Not on file    Attends Gnosticist Services: Not on file    Active Member of [address] [place] [address] or Organizations: Not on file    Attends Club or Organization Meetings: Not on file    Marital Status: Not on file   Intimate Partner Violence:     Fear of Current or Ex-Partner: Not on file    Emotionally Abused: Not on file    Physically Abused: Not on file    Sexually Abused: Not on file   Housing Stability:     Unable to Pay for Housing in the Last Year: Not on file    Number of [place] in the Last Year: Not on file    Unstable Housing in the Last Year: Not on file     Past Surgical History:   Procedure Laterality Date    ARM SURGERY Left 9/22/2020    LEFT ULNAR NERVE DECOMPRESSION (AT ELBOW) AND LEFT CARPAL TUNNEL RELEASE performed by Celi Ybarra MD at [address] [place] [address]. Right 10/13/2020    RIGHT ULNAR [address] performed by Celi [facility]barra MD at [facility]      2015    COLONOSCOPY      COLONOSCOPY N/A 12/10/2021    COLONOSCOPY POLYPECTOMY SNARE/COLD BIOPSY performed by Satnam Jason MD at [facility] COLONOSCOPY N/A 1/12/2022    [place] [facility] [place]      LUMBAR FUSION  2017    PLIF L3-S1 done in [place], [place]     Family History   Problem Relation Age of Onset   Solorzano Cancer Mother     Hypertension Mother     Diabetes Maternal Uncle    Review of Systems   Constitutional: Negative for activity change, appetite change, chills, diaphoresis, fatigue, fever and unexpected weight change. HENT: Negative for congestion, dental problem, drooling, ear discharge, ear pain, facial swelling, hearing loss, mouth sores, nosebleeds, postnasal drip, sinus pressure, sneezing, sore throat, tinnitus, trouble swallowing and voice change. Eyes: Negative for photophobia, pain, discharge, redness, itching and visual disturbance. Respiratory: Negative for apnea, cough, choking, chest tightness, shortness of breath and wheezing. Cardiovascular: Negative for chest pain, palpitations and leg swelling. Gastrointestinal: Negative for abdominal pain, anal bleeding, blood in stool, constipation, nausea, rectal pain and vomiting. Genitourinary: Negative for decreased urine volume, difficulty urinating, dysuria, enuresis, flank pain, frequency, hematuria, penile discharge, penile swelling, scrotal swelling, testicular pain and urgency. Musculoskeletal: Negative for arthralgias, back pain, gait problem, joint swelling, myalgias, neck pain and neck stiffness. Left sided neck pain   Skin: Negative for color change, pallor, rash and wound. Neurological: Positive for weakness (left upper extremity) and numbness (left upper extremity). Negative for dizziness, tremors, seizures, syncope, facial asymmetry, speech difficulty, light-headedness and headaches.    Hematological: Negative for adenopathy. Does not bruise/bleed easily. Psychiatric/Behavioral: Negative for agitation, behavioral problems, confusion, decreased concentration, dysphoric mood, hallucinations, self-injury, sleep disturbance and suicidal ideas. The patient is not nervous/anxious and is not hyperactive. Physical Exam  Constitutional:       General: He is not in acute distress. Appearance: He is well-developed. He is not diaphoretic. HENT:      Head: Normocephalic and atraumatic. Right Ear: External ear normal.      Left Ear: External ear normal.      Nose: Nose normal.      Mouth/Throat:      Pharynx: No oropharyngeal exudate. Eyes:      General: No scleral icterus. Right eye: No discharge. Left eye: No discharge. Conjunctiva/sclera: Conjunctivae normal.      Pupils: Pupils are equal, round, and reactive to light. Neck:      Thyroid: No thyromegaly. Vascular: No JVD. Trachea: No tracheal deviation. Cardiovascular:      Rate and Rhythm: Normal rate and regular rhythm. Pulses:           Carotid pulses are 2+ on the right side and 2+ on the left side. Radial pulses are 2+ on the right side and 2+ on the left side. Femoral pulses are 2+ on the right side and 2+ on the left side. Popliteal pulses are 2+ on the right side and 2+ on the left side. Dorsalis pedis pulses are 2+ on the right side and 2+ on the left side. Posterior tibial pulses are 2+ on the right side and 2+ on the left side. Heart sounds: Normal heart sounds. No murmur heard. No friction rub. Pulmonary:      Effort: Pulmonary effort is normal. No respiratory distress. Breath sounds: Normal breath sounds. No stridor. No wheezing or rales. Chest:      Chest wall: No tenderness. Abdominal:      General: Bowel sounds are normal. There is no distension. Palpations: Abdomen is soft. There is no mass. Tenderness: There is no abdominal tenderness.  There is no guarding or rebound. Musculoskeletal:         General: No tenderness. Normal range of motion. Cervical back: Normal range of motion and neck supple. Comments: Mild weakness left hand  & left upper extremity   Lymphadenopathy:      Cervical: No cervical adenopathy. Skin:     General: Skin is warm and dry. Coloration: Skin is not pale. Findings: No rash. Neurological:      Mental Status: He is oriented to person, place, and time. Cranial Nerves: No cranial nerve deficit. Motor: No abnormal muscle tone. Coordination: Coordination normal.      Deep Tendon Reflexes: Reflexes normal.   Psychiatric:         Behavior: Behavior normal.         Thought Content: Thought content normal.         Judgment: Judgment normal.         Veto was seen today for pre-op exam.    Diagnoses and all orders for this visit:    Pre-op exam    This patient is medically clear  For surgery pending his labs, ekg    -     CBC; Future  -     Basic Metabolic Panel; Future  -     Urinalysis  -     Protime-INR; Future  -     APTT; Future  -     EKG 12 Lead; Future    Primary hypertension  His bp not at goal  But he is in neck pain  Previous bp check last visit  Well controlled  Low fat diet  Cont current bp med  Will ck bp in one week however  Cont current bp meds  -     CBC; Future  -     Basic Metabolic Panel; Future  -     AST; Future  -     Urinalysis  -     Lipid, Fasting;  Future  -     EKG 12 Lead; Future    6/21/2022  5:30 PM    Labs & ekg reviewed  MEDICALLY CLEAR FOR SURGERY

## 2022-06-21 NOTE — PATIENT INSTRUCTIONS
meals   Cut back on the amount of salt you use in cooking. This will help you adjust to the taste. Do not add salt after cooking. One teaspoon of salt has about 2,300 mg of sodium.  Take the salt shaker off the table.  Flavor your food with garlic, lemon juice, onion, vinegar, herbs, and spices. Do not use soy sauce, lite soy sauce, steak sauce, onion salt, garlic salt, celery salt, or ketchup on your food.  Use low-sodium salad dressings, sauces, and ketchup. Or make your own salad dressings and sauces without adding salt.  Use less salt (or none) when recipes call for it. You can often use half the salt a recipe calls for without losing flavor. Other foods such as rice, pasta, and grains do not need added salt.  Rinse canned vegetables, and cook them in fresh water. This removes some--but not all--of the salt.  Avoid water that is naturally high in sodium or that has been treated with water softeners, which add sodium. If you buy bottled water, read the label and choose a sodium-free brand. Avoid high-sodium foods   Avoid eating:  ? Smoked, cured, salted, and canned meat, fish, and poultry. ? Ham, read, hot dogs, and luncheon meats. ? Regular, hard, and processed cheese and regular peanut butter. ? Crackers with salted tops, and other salted snack foods such as pretzels, chips, and salted popcorn. ? Frozen prepared meals, unless labeled low-sodium. ? Canned and dried soups, broths, and bouillon, unless labeled sodium-free or low-sodium. ? Canned vegetables, unless labeled sodium-free or low-sodium. ? [place] fries, pizza, tacos, and other fast foods. ? Pickles, olives, ketchup, and other condiments, especially soy sauce, unless labeled sodium-free or low-sodium. Where can you learn more? Go to https://yoni.[email or web address] org and sign in to your [employer] account.  Enter S013 in the [facility] box to learn more about \"Low Sodium Diet (2,000 Milligram): Care Instructions. \"     If you do not have an account, please click on the \"Sign Up Now\" link. Current as of: September 8, 2021               Content Version: 13.3  © 2006-2022 Healthwise, Incorporated. Care instructions adapted under license by [facility] ([facility]). If you have questions about a medical condition or this instruction, always ask your healthcare professional. [address] any warranty or liability for your use of this information. Patient Education        Learning About Low-Sodium Foods  What foods are low in sodium? The foods you eat contain nutrients, such as vitamins and minerals. Sodium is a nutrient. Your body needs the right amount to stay healthy and work as it should. You can use the list below to help you make choices about which foodsto eat. Fruits   Fresh, frozen, canned, or dried fruit  Vegetables   Fresh or frozen vegetables, with no added salt   Canned vegetables, low-sodium or with no added salt  Grains   Bagels without salted tops   Cereal with no added salt   Corn tortillas   Crackers with no added salt   Oatmeal, cooked without salt   Popcorn with no salt   Pasta and noodles, cooked without salt   Rice, cooked without salt   Unsalted pretzels  Dairy and dairy alternatives   Butter, unsalted   Cream cheese   Ice cream   Milk   Soy milk  Meats and other protein foods   Beans and peas, canned with no salt   Eggs   Fresh fish (not smoked)   Fresh meats (not smoked or cured)   Nuts and nut butter, prepared without salt   Poultry, not packaged with sodium solution   Tofu, unseasoned   Tuna, canned without salt  Seasonings   Garlic   Herbs and spices   Lemon juice   Mustard   Olive oil   Salt-free seasoning mixes   Vinegar  Work with your doctor to find out how much of this nutrient you need. Dependingon your health, you may need more or less of it in your diet. Where can you learn more?   Go to [email or web address] and sign in to your [employer] account. Enter I641 in the [facility] box to learn more about \"Learning About Low-Sodium Foods. \"     If you do not have an account, please click on the \"Sign Up Now\" link. Current as of: September 8, 2021               Content Version: 13.3  © 2006-2022 [employer]. Care instructions adapted under license by [facility] ([facility]). If you have questions about a medical condition or this instruction, always ask your healthcare professional. [place] [address] any warranty or liability for your use of this information. Patient Education        How to Read a Food Label to Limit Sodium: Care Instructions  Overview  Limiting sodium can be an important part of managing some health problems. Processed foods, fast food, and restaurant foods are the major sources of dietary sodium. The most common name for sodium is salt. Most packaged foods have a Nutrition Facts label. This will tell you how much sodium is in oneserving of food. Follow-up care is a key part of your treatment and safety. Be sure to make and go to all appointments, and call your doctor if you are having problems. It's also a good idea to know your test results and keep alist of the medicines you take. How can you care for yourself at home? Read ingredient lists on food labels   Read the list of ingredients on food labels to help you find how much sodium is in a food. The label lists the ingredients in a food in descending order (from the most to the least). If salt or sodium is high on the list, there may be a lot of sodium in the food.  Know that sodium has different names. Sodium is also called monosodium glutamate (MSG), sodium citrate, sodium alginate, and sodium phosphate. Read Nutrition Facts labels   On most foods, there is a Nutrition Facts label. This will tell you how much sodium is in one serving of food.  Look at both the serving size and the sodium amount. The serving size is located at the top of the label, usually right under the \"Nutrition Facts\" title. The amount of sodium is given in the list under the title. It is given in milligrams (mg). ? Check the serving size carefully. A single serving is often very small, and you may eat more than one serving. If this is the case, you will eat more sodium than listed on the label. For example, if the serving size for a canned soup is 1 cup and the sodium amount is 470 mg, if you have 2 cups you will eat 940 mg of sodium.  The nutrition facts for fresh fruits and vegetables are not listed on the food. They may be listed somewhere in the store. These foods usually have no sodium or low sodium.  The Nutrition Facts label also gives you the Percent Daily Value for sodium. This is how much of the recommended amount of sodium a serving contains. The daily value for sodium is 2,300 mg. So if the Percent Daily Value says 50%, this means one serving is giving you half of this, or 1,150 mg. Buy low-sodium foods   Look for foods that are made with less sodium. Watch for the following words on the label. ? \"Unsalted\" means there is no sodium added to the food. But there may be sodium already in the food naturally. ? \"Sodium-free\" means a serving has less than 5 mg of sodium. ? \"Very low sodium\" means a serving has 35 mg or less of sodium. ? \"Low-sodium\" means a serving has 140 mg or less of sodium.  \"Reduced-sodium\" means that there is 25% less sodium than what the food normally has. This is still usually too much sodium.  Buy fresh vegetables, or frozen vegetables without added sauces. Buy low-sodium versions of canned vegetables, soups, and other canned goods. Where can you learn more? Go to [email or web address]://yoni.[email or web address] org and sign in to your [employer] account.  Enter [id number] in the [facility] box to learn more about \"How to Read a Food Label to Limit Sodium: Care Instructions. \"     If you do not have an account, please click on the \"Sign Up Now\" link. Current as of: September 8, 2021               Content Version: 13.3  © 0265-3038 Healthwise, Incorporated. Care instructions adapted under license by [facility] ([facility]). If you have questions about a medical condition or this instruction, always ask your healthcare professional. [address] any warranty or liability for your use of this information.        bp recheck in one week  Get labs done  Low salt diet

## 2022-06-27 NOTE — PROGRESS NOTES
[facility] PRE-SURGICAL TESTING INSTRUCTIONS                                  PRIOR TO PROCEDURE DATE:        1. PLEASE FOLLOW ANY  GUIDELINES/ INSTRUCTIONS PRIOR TO YOUR PROCEDURE AS ADVISED BY YOUR SURGEON. 2. Arrange for someone to drive you home and be with you for the first 24 hours after discharge for your safety after your procedure for which you received sedation. Ensure it is someone we can share information with regarding your discharge. 3. You must contact your surgeon for instructions IF:   You are taking any blood thinners, aspirin, anti-inflammatory or vitamin E.   There is a change in your physical condition such as a cold, fever, rash, cuts, sores or any other infection, especially near your surgical site. 4. Do not drink alcohol the day before or day of your procedure. 5. A Pre-op History and Physical for surgery MUST be completed by your Physician or Urgent Care within 30 days of your procedure date. Please bring a copy with you on the day of your procedure and along with any other testing performed. THE DAY OF YOUR PROCEDURE:  1. Follow instructions for ARRIVAL TIME as DIRECTED BY YOUR SURGEON. 2. Enter the MAIN entrance from [address] and follow the signs to the free [employer] or [employer] parking (offered free of charge 6am-5pm). 3. Enter the Main Entrance of the hospital (do not enter from the lower level of the parking garage). Upon entrance, check in with the  at the main desk on your left. If no one is available at the desk, proceed into the [facility] Waiting Room and go through the door directly into the [facility]. There is a Check-in desk ACROSS from Room 5 (marked with a sign hanging from the ceiling). The phone number for the surgery center is [phone]. 4. Please call [phone] option #2 option #2 if you have not been preregistered yet.   On the day of your procedure bring your insurance card and photo ID. You will be registered at your bedside once brought back to your room. 5. DO NOT EAT ANYTHING eight hours prior to your arrival for surgery. May have 8 ounces of water 4 hours prior to your arrival for surgery. NOTE: ALL Gastric, Bariatric and Bowel surgery patients MUST follow their surgeon's instructions. 6. MEDICATIONS    Take the following medications with a SMALL sip of water: amlodipine   Bariatric patient's call surgeon if on diabetic medications as some need to be stopped 1 week preop   Use your usual dose of inhalers the morning of surgery. BRING your rescue inhaler with you to hospital.    Anesthesia does NOT want you to take insulin the morning of surgery. They will control your blood sugar while you are at the hospital. Please contact your ordering physician for instructions regarding your insulin the night before your procedure. If you have an insulin pump, please keep it set on basal rate. 7. Do not swallow water when brushing teeth. No gum, candy, mints or ice chips. Refrain from smoking or at least decrease the amount. 8. Dress in loose, comfortable clothing appropriate for redressing after your procedure. Do not wear jewelry (including body piercings), make-up (especially NO eye make-up), fingernail polish (NO toenail polish if foot/leg surgery), lotion, powders or metal hairclips. 9. Dentures, glasses, or contacts will need to be removed before your procedure. Bring cases for your glasses, contacts, dentures, or hearing aids to protect them while you are in surgery. 10. If you use a CPAP, please bring it with you on the day of your procedure. 11. We recommend that valuable personal  belongings such as cash, cell phones, e-tablets or jewelry, be left at home during your stay. The hospital will not be responsible for valuables that are not secured in the hospital safe.  However, if your insurance requires a co-pay, you may want to bring a method of payment, i.e. Check or credit card, if you wish to pay your co-pay the day of surgery. 12. If you are to stay overnight, you may bring a bag with personal items. Please have any large items you may need brought in by your family after your arrival to your hospital room. 15. If you have a Living Will or Durable Power of , please bring a copy on the day of your procedure. 15. With your permission, one family member may accompany you while you are being prepared for surgery. Once you are ready, additional family members may join you. HOW WE KEEP YOU SAFE and WORK TO PREVENT SURGICAL SITE INFECTIONS:  1. Health care workers should always check your ID bracelet to verify your name and birth date. You will be asked many times to state your name, date of birth, and allergies. 2. Health care workers should always clean their hands with soap or alcohol gel before providing care to you. It is okay to ask anyone if they cleaned their hands before they touch you. 3. You will be actively involved in verifying the type of procedure you are having and ensuring the correct surgical site. This will be confirmed multiple times prior to your procedure. Do NOT radha your surgery site UNLESS instructed to by your surgeon. 4. Do not shave or wax for 72 hours prior to procedure near your operative site. Shaving with a razor can irritate your skin and make it easier to develop an infection. On the day of your procedure, any hair that needs to be removed near the surgical site will be clipped by a healthcare worker using a special clippers designed to avoid skin irritation. 5. When you are in the operating room, your surgical site will be cleansed with a special soap, and in most cases, you will be given an antibiotic before the surgery begins. What to expect AFTER YOUR PROCEDURE:  1. Immediately following your procedure, your will be taken to the PACU for the first phase of your recovery.   Your nurse will help you recover from any potential side effects of anesthesia, such as extreme drowsiness, changes in your vital signs or breathing patterns. Nausea, headache, muscle aches, or sore throat may also occur after anesthesia. Your nurse will help you manage these potential side effects. 2. For comfort and safety, arrange to have someone at home with you for the first 24 hours after discharge. 3. You and your family will be given written instructions about your diet, activity, dressing care, medications, and return visits. 4. Once at home, should issues with nausea, pain, or bleeding occur, or should you notice any signs of infection, you should call your surgeon. 5. Always clean your hands before and after caring for your wound. Do not let your family touch your surgery site without cleaning their hands. 6. Narcotic pain medications can cause significant constipation. You may want to add a stool softener to your postoperative medication schedule or speak to your surgeon on how best to manage this SIDE EFFECT. SPECIAL INSTRUCTIONS     Thank you for allowing us to care for you. We strive to exceed your expectations in the delivery of care and service provided to you and your family. If you need to contact the [place] [address] staff for any reason, please call us at [phone]    Instructions reviewed with patient during preadmission testing phone interview.   Pilar Jordan RN.6/27/2022 .3:40 PM      ADDITIONAL EDUCATIONAL INFORMATION REVIEWED PER PHONE WITH YOU AND/OR YOUR FAMILY:  Yes Hibiclens® Bathing Instructions   No Antibacterial Soap

## 2022-06-27 NOTE — PROGRESS NOTES
above-named physician, his/her assistants or his/her designees, perform procedures as necessary and desirable if deemed to be in my (our) best interest.     Revised 8/2/2021                                                                          Page 1 of 2           3. I acknowledge that health care personnel may be observing this procedure for the purpose of medical education or other specified purposes as may be necessary as requested and/or approved by my (our) physician. 4. I (we) consent to the disposal by the hospital Pathologist of the removed tissue, parts or organs in accordance with hospital policy. 5. I do ____ do not ____ consent to the use of a local infiltration pain blocking agent that will be used by my provider/surgical provider to help alleviate pain during my procedure. 6. I do ____ do not ____ consent to an emergent blood transfusion in the case of a life-threatening situation that requires blood components to be administered. This consent is valid for 24 hours from the beginning of the procedure. 7. This patient does ____ or does not ____ currently have a DNR status/order. If DNR order is in place, obtain Addendum to the Surgical Consent for ALL Patients with a DNR Order to address ester-operative status for limited intervention or DNR suspension.      8. I have read and fully understand the above Consent for Operation/Procedure and that all blanks were completed before I signed the consent.   _____________________________       _____________________      ____/____am/pm  Signature of Patient or legal representative      Printed Name / Relationship            Date / Time   ____________________________       _____________________      ____/____am/pm  Witness to Signature                                    Printed Name                    Date / Time     If patient is unable to sign or is a minor, complete the following)  Patient is a minor, ____ years of age, or unable to sign because:   ______________________________________________________________________________________________    Luis E [place] If a phone consent is obtained, consent will be documented by using two health care professionals, each affirming that the consenting party has no questions and gives consent for the procedure discussed with the physician/provider.   _____________________          ____________________       _____/_____am/pm   2nd witness to phone consent        Printed name           Date / Time    Informed Consent:  I have provided the explanation described above in section 1 to the patient and/or legal representative.  I have provided the patient and/or legal representative with an opportunity to ask any questions about the proposed operation/procedure.   ___________________________          ____________________         ____/____am/pm  Provider / Proceduralist                            Printed name            Date / Time  Revised 8/2/2021                                                                      Page 2 of 2

## 2022-06-28 ENCOUNTER — TELEPHONE (OUTPATIENT)
Dept: PRIMARY CARE CLINIC | Age: 62
End: 2022-06-28

## 2022-06-28 ENCOUNTER — NURSE ONLY (OUTPATIENT)
Dept: PRIMARY CARE CLINIC | Age: 62
End: 2022-06-28

## 2022-06-28 VITALS — TEMPERATURE: 97.2 F | DIASTOLIC BLOOD PRESSURE: 98 MMHG | HEART RATE: 62 BPM | SYSTOLIC BLOOD PRESSURE: 178 MMHG

## 2022-06-28 LAB
BILIRUBIN URINE: NEGATIVE
BLOOD, URINE: NEGATIVE
CLARITY: CLEAR
COLOR: YELLOW
GLUCOSE URINE: NEGATIVE MG/DL
KETONES, URINE: NEGATIVE MG/DL
LEUKOCYTE ESTERASE, URINE: NEGATIVE
MICROSCOPIC EXAMINATION: NORMAL
NITRITE, URINE: NEGATIVE
PH UA: 6 (ref 5–8)
PROTEIN UA: NEGATIVE MG/DL
SPECIFIC GRAVITY UA: 1.01 (ref 1–1.03)
URINE TYPE: NORMAL
UROBILINOGEN, URINE: 0.2 E.U./DL

## 2022-06-28 RX ORDER — HYDRALAZINE HYDROCHLORIDE 25 MG/1
TABLET, FILM COATED ORAL
Qty: 60 TABLET | Refills: 3 | Status: SHIPPED | OUTPATIENT
Start: 2022-06-28 | End: 2022-07-13 | Stop reason: SDUPTHER

## 2022-06-28 NOTE — TELEPHONE ENCOUNTER
Pt came in to the office today for a MA visit  Blood pressure check. Pt BP was 166/92 with pulse 55. Pt sat for 5 mins his BP was re-checked and it was 178/98 with a pulse of 62. Pt will like a call.  Please advise

## 2022-06-29 ENCOUNTER — ANESTHESIA EVENT (OUTPATIENT)
Dept: OPERATING ROOM | Age: 62
DRG: 473 | End: 2022-06-29
Payer: MEDICARE

## 2022-06-30 ENCOUNTER — ANESTHESIA (OUTPATIENT)
Dept: OPERATING ROOM | Age: 62
DRG: 473 | End: 2022-06-30
Payer: MEDICARE

## 2022-06-30 ENCOUNTER — APPOINTMENT (OUTPATIENT)
Dept: GENERAL RADIOLOGY | Age: 62
DRG: 473 | End: 2022-06-30
Attending: NEUROLOGICAL SURGERY
Payer: MEDICARE

## 2022-06-30 ENCOUNTER — HOSPITAL ENCOUNTER (INPATIENT)
Age: 62
LOS: 1 days | Discharge: HOME HEALTH | DRG: 473 | End: 2022-07-01
Attending: NEUROLOGICAL SURGERY | Admitting: NEUROLOGICAL SURGERY
Payer: MEDICARE

## 2022-06-30 DIAGNOSIS — Z98.1 S/P CERVICAL SPINAL FUSION: Primary | ICD-10-CM

## 2022-06-30 PROBLEM — M48.02 CERVICAL STENOSIS OF SPINAL CANAL: Status: ACTIVE | Noted: 2022-06-30

## 2022-06-30 LAB
ABO/RH: NORMAL
ANTIBODY SCREEN: NORMAL

## 2022-06-30 PROCEDURE — 01N10ZZ RELEASE CERVICAL NERVE, OPEN APPROACH: ICD-10-PCS | Performed by: NEUROLOGICAL SURGERY

## 2022-06-30 PROCEDURE — 6360000002 HC RX W HCPCS: Performed by: PHYSICIAN ASSISTANT

## 2022-06-30 PROCEDURE — 0RB30ZZ EXCISION OF CERVICAL VERTEBRAL DISC, OPEN APPROACH: ICD-10-PCS | Performed by: NEUROLOGICAL SURGERY

## 2022-06-30 PROCEDURE — 2580000003 HC RX 258: Performed by: NEUROLOGICAL SURGERY

## 2022-06-30 PROCEDURE — 3700000000 HC ANESTHESIA ATTENDED CARE: Performed by: NEUROLOGICAL SURGERY

## 2022-06-30 PROCEDURE — 72040 X-RAY EXAM NECK SPINE 2-3 VW: CPT

## 2022-06-30 PROCEDURE — 2709999900 HC NON-CHARGEABLE SUPPLY: Performed by: NEUROLOGICAL SURGERY

## 2022-06-30 PROCEDURE — 3600000014 HC SURGERY LEVEL 4 ADDTL 15MIN: Performed by: NEUROLOGICAL SURGERY

## 2022-06-30 PROCEDURE — 86901 BLOOD TYPING SEROLOGIC RH(D): CPT

## 2022-06-30 PROCEDURE — 3209999900 FLUORO FOR SURGICAL PROCEDURES

## 2022-06-30 PROCEDURE — 2580000003 HC RX 258: Performed by: ANESTHESIOLOGY

## 2022-06-30 PROCEDURE — 00NW0ZZ RELEASE CERVICAL SPINAL CORD, OPEN APPROACH: ICD-10-PCS | Performed by: NEUROLOGICAL SURGERY

## 2022-06-30 PROCEDURE — 6360000002 HC RX W HCPCS: Performed by: NEUROLOGICAL SURGERY

## 2022-06-30 PROCEDURE — 7100000001 HC PACU RECOVERY - ADDTL 15 MIN: Performed by: NEUROLOGICAL SURGERY

## 2022-06-30 PROCEDURE — 7100000000 HC PACU RECOVERY - FIRST 15 MIN: Performed by: NEUROLOGICAL SURGERY

## 2022-06-30 PROCEDURE — A4217 STERILE WATER/SALINE, 500 ML: HCPCS | Performed by: NEUROLOGICAL SURGERY

## 2022-06-30 PROCEDURE — 2580000003 HC RX 258: Performed by: PHYSICIAN ASSISTANT

## 2022-06-30 PROCEDURE — 6360000002 HC RX W HCPCS: Performed by: NURSE ANESTHETIST, CERTIFIED REGISTERED

## 2022-06-30 PROCEDURE — 0RG40A0 FUSION OF CERVICOTHORACIC VERTEBRAL JOINT WITH INTERBODY FUSION DEVICE, ANTERIOR APPROACH, ANTERIOR COLUMN, OPEN APPROACH: ICD-10-PCS | Performed by: NEUROLOGICAL SURGERY

## 2022-06-30 PROCEDURE — C1713 ANCHOR/SCREW BN/BN,TIS/BN: HCPCS | Performed by: NEUROLOGICAL SURGERY

## 2022-06-30 PROCEDURE — 0RB50ZZ EXCISION OF CERVICOTHORACIC VERTEBRAL DISC, OPEN APPROACH: ICD-10-PCS | Performed by: NEUROLOGICAL SURGERY

## 2022-06-30 PROCEDURE — 1200000000 HC SEMI PRIVATE

## 2022-06-30 PROCEDURE — 0RG20A0 FUSION OF 2 OR MORE CERVICAL VERTEBRAL JOINTS WITH INTERBODY FUSION DEVICE, ANTERIOR APPROACH, ANTERIOR COLUMN, OPEN APPROACH: ICD-10-PCS | Performed by: NEUROLOGICAL SURGERY

## 2022-06-30 PROCEDURE — 86900 BLOOD TYPING SEROLOGIC ABO: CPT

## 2022-06-30 PROCEDURE — 00NX0ZZ RELEASE THORACIC SPINAL CORD, OPEN APPROACH: ICD-10-PCS | Performed by: NEUROLOGICAL SURGERY

## 2022-06-30 PROCEDURE — 2500000003 HC RX 250 WO HCPCS: Performed by: NURSE ANESTHETIST, CERTIFIED REGISTERED

## 2022-06-30 PROCEDURE — 6370000000 HC RX 637 (ALT 250 FOR IP): Performed by: PHYSICIAN ASSISTANT

## 2022-06-30 PROCEDURE — 86850 RBC ANTIBODY SCREEN: CPT

## 2022-06-30 PROCEDURE — 3700000001 HC ADD 15 MINUTES (ANESTHESIA): Performed by: NEUROLOGICAL SURGERY

## 2022-06-30 PROCEDURE — C1889 IMPLANT/INSERT DEVICE, NOC: HCPCS | Performed by: NEUROLOGICAL SURGERY

## 2022-06-30 PROCEDURE — 01N80ZZ RELEASE THORACIC NERVE, OPEN APPROACH: ICD-10-PCS | Performed by: NEUROLOGICAL SURGERY

## 2022-06-30 PROCEDURE — 2500000003 HC RX 250 WO HCPCS: Performed by: NEUROLOGICAL SURGERY

## 2022-06-30 PROCEDURE — 2580000003 HC RX 258: Performed by: NURSE ANESTHETIST, CERTIFIED REGISTERED

## 2022-06-30 PROCEDURE — 2720000010 HC SURG SUPPLY STERILE: Performed by: NEUROLOGICAL SURGERY

## 2022-06-30 PROCEDURE — 6360000002 HC RX W HCPCS: Performed by: ANESTHESIOLOGY

## 2022-06-30 PROCEDURE — 6370000000 HC RX 637 (ALT 250 FOR IP): Performed by: ANESTHESIOLOGY

## 2022-06-30 PROCEDURE — 3600000004 HC SURGERY LEVEL 4 BASE: Performed by: NEUROLOGICAL SURGERY

## 2022-06-30 DEVICE — GRAFT BNE SUB SM 1ML CRYOPRESERVED VIABLE CORT CANC BNE: Type: IMPLANTABLE DEVICE | Site: SPINE CERVICAL | Status: FUNCTIONAL

## 2022-06-30 DEVICE — IMPLANTABLE DEVICE: Type: IMPLANTABLE DEVICE | Site: SPINE CERVICAL | Status: FUNCTIONAL

## 2022-06-30 DEVICE — SCREW SPNL L16MM DIA4MM ANT CERV TI SELF DRL VAR ANG FULL: Type: IMPLANTABLE DEVICE | Site: SPINE CERVICAL | Status: FUNCTIONAL

## 2022-06-30 RX ORDER — HYDRALAZINE HYDROCHLORIDE 25 MG/1
25 TABLET, FILM COATED ORAL 2 TIMES DAILY
Status: DISCONTINUED | OUTPATIENT
Start: 2022-06-30 | End: 2022-07-01 | Stop reason: HOSPADM

## 2022-06-30 RX ORDER — SODIUM CHLORIDE 0.9 % (FLUSH) 0.9 %
5-40 SYRINGE (ML) INJECTION EVERY 12 HOURS SCHEDULED
Status: DISCONTINUED | OUTPATIENT
Start: 2022-06-30 | End: 2022-06-30 | Stop reason: HOSPADM

## 2022-06-30 RX ORDER — ENOXAPARIN SODIUM 100 MG/ML
40 INJECTION SUBCUTANEOUS DAILY
Status: DISCONTINUED | OUTPATIENT
Start: 2022-07-01 | End: 2022-07-01 | Stop reason: HOSPADM

## 2022-06-30 RX ORDER — AMLODIPINE BESYLATE 10 MG/1
10 TABLET ORAL DAILY
Status: DISCONTINUED | OUTPATIENT
Start: 2022-06-30 | End: 2022-06-30

## 2022-06-30 RX ORDER — FENTANYL CITRATE 50 UG/ML
INJECTION, SOLUTION INTRAMUSCULAR; INTRAVENOUS PRN
Status: DISCONTINUED | OUTPATIENT
Start: 2022-06-30 | End: 2022-06-30 | Stop reason: SDUPTHER

## 2022-06-30 RX ORDER — PROPOFOL 10 MG/ML
INJECTION, EMULSION INTRAVENOUS PRN
Status: DISCONTINUED | OUTPATIENT
Start: 2022-06-30 | End: 2022-06-30 | Stop reason: SDUPTHER

## 2022-06-30 RX ORDER — PROCHLORPERAZINE EDISYLATE 5 MG/ML
5 INJECTION INTRAMUSCULAR; INTRAVENOUS
Status: DISCONTINUED | OUTPATIENT
Start: 2022-06-30 | End: 2022-06-30 | Stop reason: HOSPADM

## 2022-06-30 RX ORDER — ROCURONIUM BROMIDE 10 MG/ML
INJECTION, SOLUTION INTRAVENOUS PRN
Status: DISCONTINUED | OUTPATIENT
Start: 2022-06-30 | End: 2022-06-30 | Stop reason: SDUPTHER

## 2022-06-30 RX ORDER — MIDAZOLAM HYDROCHLORIDE 1 MG/ML
INJECTION INTRAMUSCULAR; INTRAVENOUS PRN
Status: DISCONTINUED | OUTPATIENT
Start: 2022-06-30 | End: 2022-06-30 | Stop reason: SDUPTHER

## 2022-06-30 RX ORDER — OXYCODONE HYDROCHLORIDE 5 MG/1
5 TABLET ORAL PRN
Status: COMPLETED | OUTPATIENT
Start: 2022-06-30 | End: 2022-06-30

## 2022-06-30 RX ORDER — ACETAMINOPHEN 325 MG/1
650 TABLET ORAL EVERY 6 HOURS
Status: DISCONTINUED | OUTPATIENT
Start: 2022-06-30 | End: 2022-07-01 | Stop reason: HOSPADM

## 2022-06-30 RX ORDER — ONDANSETRON 2 MG/ML
INJECTION INTRAMUSCULAR; INTRAVENOUS PRN
Status: DISCONTINUED | OUTPATIENT
Start: 2022-06-30 | End: 2022-06-30 | Stop reason: SDUPTHER

## 2022-06-30 RX ORDER — SODIUM CHLORIDE, SODIUM LACTATE, POTASSIUM CHLORIDE, CALCIUM CHLORIDE 600; 310; 30; 20 MG/100ML; MG/100ML; MG/100ML; MG/100ML
INJECTION, SOLUTION INTRAVENOUS CONTINUOUS PRN
Status: DISCONTINUED | OUTPATIENT
Start: 2022-06-30 | End: 2022-06-30 | Stop reason: SDUPTHER

## 2022-06-30 RX ORDER — SODIUM CHLORIDE 9 MG/ML
INJECTION, SOLUTION INTRAVENOUS PRN
Status: DISCONTINUED | OUTPATIENT
Start: 2022-06-30 | End: 2022-07-01 | Stop reason: HOSPADM

## 2022-06-30 RX ORDER — SODIUM CHLORIDE 0.9 % (FLUSH) 0.9 %
5-40 SYRINGE (ML) INJECTION EVERY 12 HOURS SCHEDULED
Status: DISCONTINUED | OUTPATIENT
Start: 2022-06-30 | End: 2022-07-01 | Stop reason: HOSPADM

## 2022-06-30 RX ORDER — OXYCODONE HYDROCHLORIDE 5 MG/1
5 TABLET ORAL EVERY 4 HOURS PRN
Status: DISCONTINUED | OUTPATIENT
Start: 2022-06-30 | End: 2022-07-01 | Stop reason: HOSPADM

## 2022-06-30 RX ORDER — GLYCOPYRROLATE 1 MG/5 ML
SYRINGE (ML) INTRAVENOUS PRN
Status: DISCONTINUED | OUTPATIENT
Start: 2022-06-30 | End: 2022-06-30 | Stop reason: SDUPTHER

## 2022-06-30 RX ORDER — HYDRALAZINE HYDROCHLORIDE 20 MG/ML
10 INJECTION INTRAMUSCULAR; INTRAVENOUS ONCE
Status: COMPLETED | OUTPATIENT
Start: 2022-06-30 | End: 2022-06-30

## 2022-06-30 RX ORDER — SODIUM CHLORIDE 9 MG/ML
INJECTION, SOLUTION INTRAVENOUS PRN
Status: DISCONTINUED | OUTPATIENT
Start: 2022-06-30 | End: 2022-06-30 | Stop reason: HOSPADM

## 2022-06-30 RX ORDER — OXYCODONE HYDROCHLORIDE 5 MG/1
10 TABLET ORAL PRN
Status: COMPLETED | OUTPATIENT
Start: 2022-06-30 | End: 2022-06-30

## 2022-06-30 RX ORDER — SODIUM CHLORIDE 0.9 % (FLUSH) 0.9 %
5-40 SYRINGE (ML) INJECTION PRN
Status: DISCONTINUED | OUTPATIENT
Start: 2022-06-30 | End: 2022-06-30 | Stop reason: HOSPADM

## 2022-06-30 RX ORDER — LABETALOL HYDROCHLORIDE 5 MG/ML
10 INJECTION, SOLUTION INTRAVENOUS
Status: DISCONTINUED | OUTPATIENT
Start: 2022-06-30 | End: 2022-06-30 | Stop reason: HOSPADM

## 2022-06-30 RX ORDER — HYDRALAZINE HYDROCHLORIDE 20 MG/ML
10 INJECTION INTRAMUSCULAR; INTRAVENOUS
Status: DISCONTINUED | OUTPATIENT
Start: 2022-06-30 | End: 2022-06-30 | Stop reason: HOSPADM

## 2022-06-30 RX ORDER — DEXAMETHASONE SODIUM PHOSPHATE 4 MG/ML
INJECTION, SOLUTION INTRA-ARTICULAR; INTRALESIONAL; INTRAMUSCULAR; INTRAVENOUS; SOFT TISSUE PRN
Status: DISCONTINUED | OUTPATIENT
Start: 2022-06-30 | End: 2022-06-30 | Stop reason: SDUPTHER

## 2022-06-30 RX ORDER — SODIUM CHLORIDE, SODIUM LACTATE, POTASSIUM CHLORIDE, CALCIUM CHLORIDE 600; 310; 30; 20 MG/100ML; MG/100ML; MG/100ML; MG/100ML
INJECTION, SOLUTION INTRAVENOUS CONTINUOUS
Status: DISCONTINUED | OUTPATIENT
Start: 2022-06-30 | End: 2022-06-30 | Stop reason: HOSPADM

## 2022-06-30 RX ORDER — KETAMINE HCL IN NACL, ISO-OSM 20 MG/2 ML
SYRINGE (ML) INJECTION PRN
Status: DISCONTINUED | OUTPATIENT
Start: 2022-06-30 | End: 2022-06-30 | Stop reason: SDUPTHER

## 2022-06-30 RX ORDER — SODIUM CHLORIDE 9 MG/ML
INJECTION, SOLUTION INTRAVENOUS CONTINUOUS
Status: DISCONTINUED | OUTPATIENT
Start: 2022-06-30 | End: 2022-07-01

## 2022-06-30 RX ORDER — PROPOFOL 10 MG/ML
INJECTION, EMULSION INTRAVENOUS CONTINUOUS PRN
Status: DISCONTINUED | OUTPATIENT
Start: 2022-06-30 | End: 2022-06-30 | Stop reason: SDUPTHER

## 2022-06-30 RX ORDER — REMIFENTANIL HYDROCHLORIDE 1 MG/ML
INJECTION, POWDER, LYOPHILIZED, FOR SOLUTION INTRAVENOUS CONTINUOUS PRN
Status: DISCONTINUED | OUTPATIENT
Start: 2022-06-30 | End: 2022-06-30 | Stop reason: SDUPTHER

## 2022-06-30 RX ORDER — ONDANSETRON 2 MG/ML
4 INJECTION INTRAMUSCULAR; INTRAVENOUS EVERY 6 HOURS PRN
Status: DISCONTINUED | OUTPATIENT
Start: 2022-06-30 | End: 2022-07-01 | Stop reason: HOSPADM

## 2022-06-30 RX ORDER — PROCHLORPERAZINE EDISYLATE 5 MG/ML
10 INJECTION INTRAMUSCULAR; INTRAVENOUS EVERY 6 HOURS PRN
Status: DISCONTINUED | OUTPATIENT
Start: 2022-06-30 | End: 2022-07-01 | Stop reason: HOSPADM

## 2022-06-30 RX ORDER — FENTANYL CITRATE 50 UG/ML
50 INJECTION, SOLUTION INTRAMUSCULAR; INTRAVENOUS EVERY 5 MIN PRN
Status: DISCONTINUED | OUTPATIENT
Start: 2022-06-30 | End: 2022-06-30 | Stop reason: HOSPADM

## 2022-06-30 RX ORDER — METHOCARBAMOL 750 MG/1
750 TABLET, FILM COATED ORAL EVERY 8 HOURS PRN
Status: DISCONTINUED | OUTPATIENT
Start: 2022-06-30 | End: 2022-07-01 | Stop reason: HOSPADM

## 2022-06-30 RX ORDER — SUCCINYLCHOLINE CHLORIDE 20 MG/ML
INJECTION INTRAMUSCULAR; INTRAVENOUS PRN
Status: DISCONTINUED | OUTPATIENT
Start: 2022-06-30 | End: 2022-06-30 | Stop reason: SDUPTHER

## 2022-06-30 RX ORDER — HYDROMORPHONE HCL 110MG/55ML
PATIENT CONTROLLED ANALGESIA SYRINGE INTRAVENOUS PRN
Status: DISCONTINUED | OUTPATIENT
Start: 2022-06-30 | End: 2022-06-30 | Stop reason: SDUPTHER

## 2022-06-30 RX ORDER — LISINOPRIL 40 MG/1
40 TABLET ORAL DAILY
Status: DISCONTINUED | OUTPATIENT
Start: 2022-07-01 | End: 2022-07-01 | Stop reason: HOSPADM

## 2022-06-30 RX ORDER — DIAZEPAM 5 MG/1
5 TABLET ORAL EVERY 6 HOURS PRN
Status: DISCONTINUED | OUTPATIENT
Start: 2022-06-30 | End: 2022-07-01 | Stop reason: HOSPADM

## 2022-06-30 RX ORDER — ONDANSETRON 4 MG/1
4 TABLET, ORALLY DISINTEGRATING ORAL EVERY 8 HOURS PRN
Status: DISCONTINUED | OUTPATIENT
Start: 2022-06-30 | End: 2022-07-01 | Stop reason: HOSPADM

## 2022-06-30 RX ORDER — LIDOCAINE HYDROCHLORIDE 20 MG/ML
INJECTION, SOLUTION INTRAVENOUS PRN
Status: DISCONTINUED | OUTPATIENT
Start: 2022-06-30 | End: 2022-06-30 | Stop reason: SDUPTHER

## 2022-06-30 RX ORDER — SODIUM CHLORIDE 0.9 % (FLUSH) 0.9 %
5-40 SYRINGE (ML) INJECTION PRN
Status: DISCONTINUED | OUTPATIENT
Start: 2022-06-30 | End: 2022-07-01 | Stop reason: HOSPADM

## 2022-06-30 RX ORDER — ONDANSETRON 2 MG/ML
4 INJECTION INTRAMUSCULAR; INTRAVENOUS
Status: DISCONTINUED | OUTPATIENT
Start: 2022-06-30 | End: 2022-06-30 | Stop reason: HOSPADM

## 2022-06-30 RX ADMIN — ONDANSETRON 4 MG: 2 INJECTION INTRAMUSCULAR; INTRAVENOUS at 10:15

## 2022-06-30 RX ADMIN — Medication 0.4 MG: at 08:19

## 2022-06-30 RX ADMIN — PROPOFOL 250 MG: 10 INJECTION, EMULSION INTRAVENOUS at 07:39

## 2022-06-30 RX ADMIN — HYDRALAZINE HYDROCHLORIDE 25 MG: 25 TABLET, FILM COATED ORAL at 20:44

## 2022-06-30 RX ADMIN — MIDAZOLAM HYDROCHLORIDE 2 MG: 2 INJECTION, SOLUTION INTRAMUSCULAR; INTRAVENOUS at 07:33

## 2022-06-30 RX ADMIN — HYDROMORPHONE HYDROCHLORIDE 0.5 MG: 1 INJECTION, SOLUTION INTRAMUSCULAR; INTRAVENOUS; SUBCUTANEOUS at 17:28

## 2022-06-30 RX ADMIN — Medication 20 MG: at 08:06

## 2022-06-30 RX ADMIN — LIDOCAINE HYDROCHLORIDE 100 MG: 20 INJECTION, SOLUTION INTRAVENOUS at 07:39

## 2022-06-30 RX ADMIN — PHENYLEPHRINE HYDROCHLORIDE 15 MCG/MIN: 10 INJECTION, SOLUTION INTRAMUSCULAR; INTRAVENOUS; SUBCUTANEOUS at 08:13

## 2022-06-30 RX ADMIN — CEFAZOLIN 2000 MG: 2 INJECTION, POWDER, FOR SOLUTION INTRAMUSCULAR; INTRAVENOUS at 16:26

## 2022-06-30 RX ADMIN — ACETAMINOPHEN 650 MG: 325 TABLET ORAL at 16:16

## 2022-06-30 RX ADMIN — METHOCARBAMOL 1000 MG: 100 INJECTION INTRAMUSCULAR; INTRAVENOUS at 11:21

## 2022-06-30 RX ADMIN — ACETAMINOPHEN 650 MG: 325 TABLET ORAL at 21:14

## 2022-06-30 RX ADMIN — Medication 10 MG: at 09:10

## 2022-06-30 RX ADMIN — SODIUM CHLORIDE, SODIUM LACTATE, POTASSIUM CHLORIDE, CALCIUM CHLORIDE: 600; 310; 30; 20 INJECTION, SOLUTION INTRAVENOUS at 07:34

## 2022-06-30 RX ADMIN — CEFAZOLIN 2000 MG: 2 INJECTION, POWDER, FOR SOLUTION INTRAMUSCULAR; INTRAVENOUS at 07:33

## 2022-06-30 RX ADMIN — OXYCODONE 10 MG: 5 TABLET ORAL at 14:57

## 2022-06-30 RX ADMIN — PROCHLORPERAZINE EDISYLATE 10 MG: 5 INJECTION, SOLUTION INTRAMUSCULAR; INTRAVENOUS at 18:51

## 2022-06-30 RX ADMIN — HYDROMORPHONE HYDROCHLORIDE 0.5 MG: 1 INJECTION, SOLUTION INTRAMUSCULAR; INTRAVENOUS; SUBCUTANEOUS at 11:36

## 2022-06-30 RX ADMIN — SUCCINYLCHOLINE CHLORIDE 160 MG: 20 INJECTION, SOLUTION INTRAMUSCULAR; INTRAVENOUS; PARENTERAL at 07:39

## 2022-06-30 RX ADMIN — ONDANSETRON 4 MG: 2 INJECTION INTRAMUSCULAR; INTRAVENOUS at 16:17

## 2022-06-30 RX ADMIN — SUGAMMADEX 400 MG: 100 INJECTION, SOLUTION INTRAVENOUS at 08:45

## 2022-06-30 RX ADMIN — SODIUM CHLORIDE, SODIUM LACTATE, POTASSIUM CHLORIDE, AND CALCIUM CHLORIDE: .6; .31; .03; .02 INJECTION, SOLUTION INTRAVENOUS at 10:30

## 2022-06-30 RX ADMIN — HYDROMORPHONE HYDROCHLORIDE 0.5 MG: 1 INJECTION, SOLUTION INTRAMUSCULAR; INTRAVENOUS; SUBCUTANEOUS at 20:43

## 2022-06-30 RX ADMIN — SODIUM CHLORIDE: 9 INJECTION, SOLUTION INTRAVENOUS at 23:52

## 2022-06-30 RX ADMIN — HYDROMORPHONE HYDROCHLORIDE 0.5 MG: 1 INJECTION, SOLUTION INTRAMUSCULAR; INTRAVENOUS; SUBCUTANEOUS at 14:28

## 2022-06-30 RX ADMIN — HYDROMORPHONE HYDROCHLORIDE 2 MG: 2 INJECTION, SOLUTION INTRAMUSCULAR; INTRAVENOUS; SUBCUTANEOUS at 07:58

## 2022-06-30 RX ADMIN — SODIUM CHLORIDE: 9 INJECTION, SOLUTION INTRAVENOUS at 15:55

## 2022-06-30 RX ADMIN — HYDROMORPHONE HYDROCHLORIDE 0.5 MG: 1 INJECTION, SOLUTION INTRAMUSCULAR; INTRAVENOUS; SUBCUTANEOUS at 11:22

## 2022-06-30 RX ADMIN — FENTANYL CITRATE 100 MCG: 50 INJECTION, SOLUTION INTRAMUSCULAR; INTRAVENOUS at 07:39

## 2022-06-30 RX ADMIN — ROCURONIUM BROMIDE 45 MG: 10 INJECTION INTRAVENOUS at 08:27

## 2022-06-30 RX ADMIN — DIAZEPAM 5 MG: 5 TABLET ORAL at 16:15

## 2022-06-30 RX ADMIN — HYDRALAZINE HYDROCHLORIDE 10 MG: 20 INJECTION INTRAMUSCULAR; INTRAVENOUS at 12:11

## 2022-06-30 RX ADMIN — SODIUM CHLORIDE, PRESERVATIVE FREE 10 ML: 5 INJECTION INTRAVENOUS at 23:52

## 2022-06-30 RX ADMIN — SODIUM CHLORIDE, SODIUM LACTATE, POTASSIUM CHLORIDE, AND CALCIUM CHLORIDE: .6; .31; .03; .02 INJECTION, SOLUTION INTRAVENOUS at 07:34

## 2022-06-30 RX ADMIN — CEFAZOLIN 2000 MG: 2 INJECTION, POWDER, FOR SOLUTION INTRAMUSCULAR; INTRAVENOUS at 23:54

## 2022-06-30 RX ADMIN — SODIUM CHLORIDE, POTASSIUM CHLORIDE, SODIUM LACTATE AND CALCIUM CHLORIDE: 600; 310; 30; 20 INJECTION, SOLUTION INTRAVENOUS at 06:49

## 2022-06-30 RX ADMIN — ROCURONIUM BROMIDE 5 MG: 10 INJECTION INTRAVENOUS at 07:39

## 2022-06-30 RX ADMIN — PROPOFOL 150 MCG/KG/MIN: 10 INJECTION, EMULSION INTRAVENOUS at 07:39

## 2022-06-30 RX ADMIN — Medication 10 MG: at 10:15

## 2022-06-30 RX ADMIN — REMIFENTANIL HYDROCHLORIDE 0.2 MCG/KG/MIN: 1 INJECTION, POWDER, LYOPHILIZED, FOR SOLUTION INTRAVENOUS at 07:39

## 2022-06-30 RX ADMIN — DEXAMETHASONE SODIUM PHOSPHATE 10 MG: 4 INJECTION, SOLUTION INTRAMUSCULAR; INTRAVENOUS at 08:08

## 2022-06-30 ASSESSMENT — PAIN - FUNCTIONAL ASSESSMENT
PAIN_FUNCTIONAL_ASSESSMENT: 0-10
PAIN_FUNCTIONAL_ASSESSMENT: PREVENTS OR INTERFERES SOME ACTIVE ACTIVITIES AND ADLS

## 2022-06-30 ASSESSMENT — PAIN SCALES - GENERAL
PAINLEVEL_OUTOF10: 7
PAINLEVEL_OUTOF10: 6
PAINLEVEL_OUTOF10: 8
PAINLEVEL_OUTOF10: 9
PAINLEVEL_OUTOF10: 0
PAINLEVEL_OUTOF10: 10
PAINLEVEL_OUTOF10: 7
PAINLEVEL_OUTOF10: 7
PAINLEVEL_OUTOF10: 6
PAINLEVEL_OUTOF10: 3
PAINLEVEL_OUTOF10: 7
PAINLEVEL_OUTOF10: 10
PAINLEVEL_OUTOF10: 5

## 2022-06-30 ASSESSMENT — PAIN DESCRIPTION - ORIENTATION
ORIENTATION: LEFT
ORIENTATION: LEFT
ORIENTATION: MID;LEFT
ORIENTATION: MID
ORIENTATION: LEFT
ORIENTATION: LEFT
ORIENTATION: MID

## 2022-06-30 ASSESSMENT — PAIN DESCRIPTION - DESCRIPTORS
DESCRIPTORS: ACHING
DESCRIPTORS: DULL;DISCOMFORT;SORE
DESCRIPTORS: SORE
DESCRIPTORS: ACHING

## 2022-06-30 ASSESSMENT — PAIN DESCRIPTION - LOCATION
LOCATION: NECK
LOCATION: HEAD
LOCATION: NECK;INCISION
LOCATION: NECK
LOCATION: INCISION;NECK
LOCATION: NECK
LOCATION: NECK;BACK
LOCATION: NECK
LOCATION: INCISION;NECK

## 2022-06-30 ASSESSMENT — PAIN DESCRIPTION - PAIN TYPE
TYPE: SURGICAL PAIN
TYPE: SURGICAL PAIN
TYPE: CHRONIC PAIN
TYPE: SURGICAL PAIN

## 2022-06-30 ASSESSMENT — PAIN DESCRIPTION - ONSET
ONSET: ON-GOING

## 2022-06-30 ASSESSMENT — PAIN DESCRIPTION - FREQUENCY
FREQUENCY: CONTINUOUS

## 2022-06-30 ASSESSMENT — LIFESTYLE VARIABLES: SMOKING_STATUS: 0

## 2022-06-30 NOTE — OP NOTE
paralysis, esophageal injury, and hoarseness of voice. Having failed conservative management and experiencing persistent symptoms, the patient elected to proceed with anterior cervical discectomy with stabilization and fusion at C5-T1. DETAILS OF PROCEDURE:  Under universal basic protocol, the patient was brought to the operating room and placed under general anesthesia. Intravenous antibiotics were given by anesthesia, Ancef 2 g. They were then placed supine on the operating table. The anterior cervical area was prepped and draped in the usual sterile fashion. Using a #15 blade knife, the skin was incised in a horizontal fashion over the target interspaces. Sequential compression boots were placed on the patient's legs. All pressure points were adequately padded and the patient was secured to the operation table. The neck was cleansed with alcohol and a transverse incision was planned on the right side of the neck for exposure of the C5-T1 levels. The operative field was then prepped and draped in the usual sterile fashion. An incision was made with the 15-blade knife through the skin and dermis. Using Bovie electrocautery, I dissected through the platysma muscle. The sternocleidomastoid muscle was identified and the loose connective tissue overlying this was sharply dissected. I then dissected bluntly along the medial border of the sternocleidomastoid muscle down to the anterior surface of the spine. A handheld retractor was inserted to retract the esophagus and trachea medially. Sharp dissection was used over the loose connective tissue on the anterior surface of the spine until the anterior surface of the spine was completely visible. A #4 Gabi Vega was then place over the disk and the lateral fluoroscopy was obtained showing this to be the C5-6 disk. Using Bovie electrocautery, I then elevated the longus colli muscle off the anterior surface of the spine from C5-C7.   I dissected the anterior longitudinal ligament off of the endplates of C5, C6, C7 and T1 until the uncinate processes were identified bilaterally. Blackbelt self-retaining retractor blades were then placed with blades medial and lateral to provide appropriate retraction. The microscope was then brought in for the purpose of microscopic dissection. The disk was incised with a 15-blade knife and the anterior portion of the disk was removed with the pituitary rongeur. I then used the Midas-Yoel drill bit to drill away the remainder of the disk down to the posterior longitudinal ligament as well as drilling away the bony spurs overlying the endplate. Once this was completed, I was able to use a nerve hook and slip underneath the posterior longitudinal ligament and create an opening through the ligament. A 2mm punch was then used to punch away the ligament in its entirety from the right foramen to the left foramen. All free disk fragments as well as bony spurs were drilled and punched away until both neural foramina were well decompressed. Following this, I was able to palpate superiorly and inferiorly underneath the endplates and they felt well decompressed. Endplates were then prepared drilling away the cartilaginous endplates to the subchondral bleeding bone for preparation for fusion. The blackbelt self-retaining retractor blades were then placed at the C6-7 interspace with blades medial and lateral to provide appropriate retraction. The disk was incised with a 15-blade knife and the anterior portion of the disk was removed with the pituitary rongeur. I then used the Midas-Yoel drill bit to drill away the remainder of the disk down to the posterior longitudinal ligament as well as drilling away the bony spurs overlying the endplate. Once this was completed, I was able to use a nerve hook and slip underneath the posterior longitudinal ligament and create an opening through the ligament.   A 2 mm punch was then used to punch away the ligament in its entirety from the right foramen to the left foramen. All free disk fragments as well as bony spurs were drilled and punched away until both neural foramina were well decompressed. Following this, I was able to palpate superiorly and inferiorly underneath the endplates and they felt well decompressed. Endplates were then prepared drilling away the cartilaginous endplates to the subchondral bleeding bone for preparation for fusion. The blackbelt self-retaining retractor blades were then placed at the C7-T1 interspace with blades medial and lateral to provide appropriate retraction. The disk was incised with a 15-blade knife and the anterior portion of the disk was removed with the pituitary rongeur. I then used the Projectioneering-Yoel drill bit to drill away the remainder of the disk down to the posterior longitudinal ligament as well as drilling away the bony spurs overlying the endplate. Once this was completed, I was able to use a nerve hook and slip underneath the posterior longitudinal ligament and create an opening through the ligament. A 2 mm punch was then used to punch away the ligament in its entirety from the right foramen to the left foramen. All free disk fragments as well as bony spurs were drilled and punched away until both neural foramina were well decompressed. Following this, I was able to palpate superiorly and inferiorly underneath the endplates and they felt well decompressed. Endplates were then prepared drilling away the cartilaginous endplates to the subchondral bleeding bone for preparation for fusion. At this point, the wound was copiously irrigated with antibiotic solution. Anesthesia performed mild cervical traction, and a 9 mm spacer was tapped into the C5-6 disk space. This fit appropriately. I then rasped the disk space with a 9 mm rasp.   I therefore prepared a 9 mm Synthes Ti/PEEK graft filled with Vivigen soaked in autologous BMA and tapped this into the disk space until approximately 1-2 mm countersunk. At this point Anesthesia released their traction and the graft fit very snugly. Anesthesia once again performed mild cervical traction, and a 9 mm spacer was tapped into the C6-7 disk space. This fit appropriately. I then rasped the disk space with a 9 mm rasp. I therefore prepared a 9 mm Synthes Ti/PEEK graft filled with Vivigen soaked in autologous BMA and tapped this into the disk space until approximately 1-2 mm countersunk. At this point Anesthesia released their traction and the graft fit very snugly. Anesthesia once again performed mild cervical traction, and a 9 mm spacer was tapped into the C7-T1 disk space. This fit appropriately. I then rasped the disk space with a 9 mm rasp. I therefore prepared a 9 mm Synthes Ti/PEEK graft filled with Vivigen soaked in autologous BMA and tapped this into the disk space until approximately 1-2 mm countersunk. At this point Anesthesia released their traction and the graft fit very snugly. I then placed a Synthes Nicut spine plate on the anterior surface of the spine and it fit appropriately. Two 16 mm screws were placed into each of the C5, C6, C7 and T1 vertebral bodies with appropriate angulation. All screws were placed and checked with lateral fluoroscopy and confirmed to be in good position. The screws were then tightened down fully. The locking cams were then turned to fully tightened position with cam covering both screws at each level. Final lateral and AP fluoroscopy were obtained and confirmed the plate, screws and graft to be in good position. The fluoroscopy was then removed. Hemostasis was achieved with bipolar cautery and Floseal. Hemostasis was visually confirmed. The wound was copiously irrigated with antibiotic solution. The wound was then closed with 3-0 Vicryl suture to close the platysma and a 4-0 Monocryl running subcuticular stitch for the skin.   Dermabond was applied to the

## 2022-06-30 NOTE — ANESTHESIA PRE PROCEDURE
Department of Anesthesiology  Preprocedure Note       Name:  Paul Breaux   Age:  65 y.o.  :  1960                                          MRN:  [id number]         Date:  2022      Surgeon: Kasandra Harp):  Zeny Love MD    Procedure: Procedure(s):  C4-T1 ANTERIOR CERVICAL DISCECTOMY AND FUSION    Medications prior to admission:   Prior to Admission medications    Medication Sig Start Date End Date Taking? Authorizing Provider   hydrALAZINE (APRESOLINE) 25 MG tablet Take one tablet   Twice a day for blood pressure 22   Nahum Wilson MD   amLODIPine (NORVASC) 10 MG tablet Take 1 tablet by mouth daily 22   Nahum Wilson MD   lisinopril (PRINIVIL;ZESTRIL) 40 MG tablet Take 1 tablet by mouth daily 22   Nahum Wilson MD   allopurinol (ZYLOPRIM) 300 MG tablet Take 1 tablet by mouth daily For arthritis stop allopurinol 100 mg 22   Nahum Wilson MD   atorvastatin (LIPITOR) 20 MG tablet Take 1 tablet by mouth daily To prevent heart disease  Patient not taking: Reported on 2022   Nahum Wilson MD   tiZANidine (ZANAFLEX) 4 MG tablet Take 1 tablet by mouth nightly as needed (prn) Muscle spasm  Neck pain  Patient not taking: Reported on 2022 10/25/21   Nahum Wilson MD       Current medications:    Current Facility-Administered Medications   Medication Dose Route Frequency Provider Last Rate Last Admin    ceFAZolin (ANCEF) 2,000 mg in sodium chloride 0.9 % 50 mL IVPB (mini-bag)  2,000 mg IntraVENous Once Ruddy Love MD        lactated ringers infusion   IntraVENous Continuous Anna Polo  mL/hr at 22 0649 New Bag at 22 0649    sodium chloride flush 0.9 % injection 5-40 mL  5-40 mL IntraVENous 2 times per day Anna Polo MD        sodium chloride flush 0.9 % injection 5-40 mL  5-40 mL IntraVENous PRN Anna Polo MD        0.9 % sodium chloride infusion   IntraVENous PRN Anna Polo MD           Allergies: No Known Allergies    Problem List:    Patient Active Problem List   Diagnosis Code    TISHA positive R76.8    DDD (degenerative disc disease), cervical M50.30    DDD (degenerative disc disease), lumbar M51.36    Depression F32. A    Helicobacter pylori infection A04.8    Hand arthritis M19.049    Hypertension I10    Hypertriglyceridemia E78.1    Left arm weakness R29.898    Left low back pain M54.50    Neck pain on left side M54.2    Right wrist pain M25.531    SS-A antibody positive R76.8    SS-B antibody positive R76.8    Postlaminectomy syndrome, lumbar M96.1    Chronic pain syndrome G89.4    Synovitis of wrist M65.9    Cervical disc herniation M50.20    Gouty arthritis M10.9    Pre-diabetes R73.03    Pure hypercholesterolemia E78.00    Spinal stenosis, cervical region M48.02       Past Medical History:        Diagnosis Date    Arthritis     neck, back    Arthropathy     TISHA positive    Back pain     Depression     Gout     Hyperlipidemia     Hypertension     Wears dentures        Past Surgical History:        Procedure Laterality Date    ARM SURGERY Left 9/22/2020    LEFT ULNAR NERVE DECOMPRESSION (AT ELBOW) AND LEFT CARPAL TUNNEL RELEASE performed by Andre Mills MD at [address] [place] [address]. Right 10/13/2020    RIGHT ULNAR NERVE DECOMPRESSION AT ELBOW AND RIGHT CARPAL TUNNEL RELEASE performed by Andre Mills MD at [address] [place] [address]      2015    COLONOSCOPY      COLONOSCOPY N/A 12/10/2021    COLONOSCOPY POLYPECTOMY SNARE/COLD BIOPSY performed by Ana María Self MD at [facility] COLONOSCOPY N/A 1/12/2022    COLONOSCOPY DIAGNOSTIC performed by Ana María Self MD at [facility], DIAGNOSTIC      LUMBAR FUSION  2017    PLIF L3-S1 done in [place], [place]       Social History:    Social History     Tobacco Use    Smoking status: Never Smoker    Smokeless tobacco: Never Used   Substance Use Topics    Alcohol use:  No Counseling given: Not Answered      Vital Signs (Current):   Vitals:    06/27/22 1536 06/30/22 0638   BP:  (!) 153/98   Pulse:  81   Resp:  18   Temp:  97.3 °F (36.3 °C)   TempSrc:  Temporal   SpO2:  100%   Weight: 220 lb (99.8 kg) 203 lb 3.2 oz (92.2 kg)   Height: 6' 5\" (1.956 m) 6' 5\" (1.956 m)                                              BP Readings from Last 3 Encounters:   06/30/22 (!) 153/98   06/28/22 (!) 178/98   06/21/22 (!) 162/93       NPO Status: Time of last liquid consumption: 2130                        Time of last solid consumption: 2130                        Date of last liquid consumption: 06/29/22                        Date of last solid food consumption: 06/29/22    BMI:   Wt Readings from Last 3 Encounters:   06/30/22 203 lb 3.2 oz (92.2 kg)   06/21/22 207 lb (93.9 kg)   06/02/22 204 lb 5.9 oz (92.7 kg)     Body mass index is 24.1 kg/m². CBC:   Lab Results   Component Value Date/Time    WBC 4.7 06/21/2022 10:00 AM    RBC 4.80 06/21/2022 10:00 AM    HGB 12.7 06/21/2022 10:00 AM    HCT 38.6 06/21/2022 10:00 AM    MCV 80.3 06/21/2022 10:00 AM    RDW 15.2 06/21/2022 10:00 AM     06/21/2022 10:00 AM       CMP:   Lab Results   Component Value Date/Time     06/21/2022 10:00 AM    K 4.5 06/21/2022 10:00 AM     06/21/2022 10:00 AM    CO2 23 06/21/2022 10:00 AM    BUN 12 06/21/2022 10:00 AM    CREATININE 1.0 06/21/2022 10:00 AM    GFRAA >60 06/21/2022 10:00 AM    AGRATIO 1.3 08/14/2020 10:03 AM    LABGLOM >60 06/21/2022 10:00 AM    GLUCOSE 88 06/21/2022 10:00 AM    PROT 8.0 08/14/2020 10:03 AM    CALCIUM 9.5 06/21/2022 10:00 AM    BILITOT <0.2 08/14/2020 10:03 AM    ALKPHOS 71 08/14/2020 10:03 AM    AST 15 06/21/2022 10:00 AM    ALT 16 09/27/2021 11:57 AM       POC Tests: No results for input(s): POCGLU, POCNA, POCK, POCCL, POCBUN, POCHEMO, POCHCT in the last 72 hours.     Coags:   Lab Results   Component Value Date/Time    PROTIME 13.0 06/21/2022 10:00 AM    INR 1.00 06/21/2022 10:00 AM    APTT 34.9 06/21/2022 10:00 AM       HCG (If Applicable): No results found for: PREGTESTUR, PREGSERUM, HCG, HCGQUANT     ABGs: No results found for: PHART, PO2ART, WJB4MNT, LAE0MOH, BEART, Y2BHAWWJ     Type & Screen (If Applicable):  No results found for: LABABO, LABRH    Drug/Infectious Status (If Applicable):  No results found for: HIV, HEPCAB    COVID-19 Screening (If Applicable):   Lab Results   Component Value Date/Time    COVID19 NOT DETECTED 10/07/2020 09:52 AM           Anesthesia Evaluation  Patient summary reviewed and Nursing notes reviewed no history of anesthetic complications:   Airway: Mallampati: II  TM distance: >3 FB   Neck ROM: limited  Comment: Limited flexion and extension   Mouth opening: > = 3 FB   Dental:    (+) edentulous      Pulmonary:       (-) not a current smoker                           Cardiovascular:  Exercise tolerance: good (>4 METS),   (+) hypertension:, hyperlipidemia        Rhythm: regular  Rate: normal                 ROS comment: Denies CP/SOB     Neuro/Psych:   (+) depression/anxiety              ROS comment: chronic cervical pain and bilateral UE (left worse than right) pain  GI/Hepatic/Renal: Neg GI/Hepatic/Renal ROS            Endo/Other: Negative Endo/Other ROS                    Abdominal:             Vascular: Other Findings:           Anesthesia Plan      general     ASA 2       Induction: intravenous. MIPS: Prophylactic antiemetics administered. Anesthetic plan and risks discussed with patient. Plan discussed with CRNA.                     Maggi Angela DO   6/30/2022

## 2022-06-30 NOTE — H&P
Dariusz     [id number]    [facility] Same Day Surgery Update H & P  Department of General Surgery   Surgical Service   Pre-operative History and Physical  Last H & P within the last 30 days. DIAGNOSIS:   Cervical stenosis of spine [M48.02]  Cervical radiculopathy [M54.12]    Procedure(s):  C4-T1 ANTERIOR CERVICAL DISCECTOMY AND FUSION     History obtained from: Patient interview and EHR     HISTORY OF PRESENT ILLNESS:   The patient is a 65 y.o. male with c/o chronic cervical pain and bilateral UE (left worse than right) pain in the setting of MRI demonstrated multilevel spinal stenosis from C3-T1. There is a superimposed disc herniation at the C7-T1 level. Their symptoms have been recalcitrant to conservative treatment and the patient presents today for the above procedure. Illness Screening: Patient denies fever, chills, worsening cough, or close contact with sick individuals.        Past Medical History:        Diagnosis Date    Arthritis     neck, back    Arthropathy     TISHA positive    Back pain     Depression     Gout     Hyperlipidemia     Hypertension     Wears dentures      Past Surgical History:        Procedure Laterality Date    ARM SURGERY Left 9/22/2020    LEFT ULNAR NERVE DECOMPRESSION (AT ELBOW) AND LEFT CARPAL TUNNEL RELEASE performed by Luis Fernando Enamorado MD at [address] [place] [address]. Right 10/13/2020    RIGHT ULNAR NERVE DECOMPRESSION AT ELBOW AND RIGHT CARPAL TUNNEL RELEASE performed by Luis Fernando Enamorado MD at [address]      2015    COLONOSCOPY      COLONOSCOPY N/A 12/10/2021    COLONOSCOPY POLYPECTOMY SNARE/COLD BIOPSY performed by Susana Davidson MD at [address] [place] [address] N/A 1/12/2022    COLONOSCOPY DIAGNOSTIC performed by Susana Davidson MD at [facility], DIAGNOSTIC      LUMBAR FUSION  2017    PLIF L3-S1 done in [place], [place]           Medications Prior to Admission:      Prior to Admission medications    Medication Sig

## 2022-06-30 NOTE — PROGRESS NOTES
Patient admitted to PACU bed 18 from OR via bed s/p C4-T1 ANTERIOR CERVICAL DISCECTOMY AND FUSION. Report received at bedside from CRNA and OR staff at [phone]. No complications reported. Pt connected to PACU monitoring equipment, IVF infusing, no pain noted. Patient arrived not fully awake from anesthesia, on O2 @ 4L NC with oral airway in place breathing easy and unlabored. Surgical incision covered with surgical glue, C/D/I. Will continue to monitor.

## 2022-06-30 NOTE — PROGRESS NOTES
Patient arrived at 0. Vital sign stable exception elevated BP.  All fall precaution place and call light within a reach

## 2022-06-30 NOTE — FLOWSHEET NOTE
PACU Transfer Note    Vitals:    06/30/22 1500   BP: (!) 149/99   Pulse: 90   Resp: 26   Temp: 97.8 °F (36.6 °C)   SpO2: 100%   BP is at patient's baseline value. In: 2206 [I.V.:2206]  Out: 1650 [Urine:1600]    Pain assessment:  present - adequately treated, pain pills given prior to leaving pacu  Pain Level: 5    Report given to Receiving unit RNGertrude at bedside in the pacu. Transferred with all belongings to ready room in bed. Patient has cell phone and walking stick/cane. Call placed to family to make aware of room assignment and transfer.     6/30/2022 3:11 PM

## 2022-06-30 NOTE — FLOWSHEET NOTE
Sleeping now when not disturbed. No complaints. VSS. Will place in clinical discharge while waiting on 5T bed. VS changed to every hour, assessments to every 2 hours.

## 2022-06-30 NOTE — ANESTHESIA POSTPROCEDURE EVALUATION
Department of Anesthesiology  Postprocedure Note    Patient: Gavin Murphy  MRN: [id number]  YOB: 1960  Date of evaluation: 6/30/2022      Procedure Summary     Date: 06/30/22 Room / Location: [facility]    Anesthesia Start: 3453 Anesthesia Stop: 7263    Procedure: C4-T1 ANTERIOR CERVICAL DISCECTOMY AND FUSION (N/A Spine Cervical) Diagnosis:       Cervical stenosis of spine      Cervical radiculopathy      (Cervical stenosis of spine [M48.02] Cervical radiculopathy [M54.12])    Surgeons: Gloria Agosto. Lieutenant Urszula MD Responsible Provider: Shukri Horvath DO    Anesthesia Type: general ASA Status: 2          Anesthesia Type: No value filed.     Terra Phase I: Terra Score: 7    Terra Phase II:        Anesthesia Post Evaluation    Patient location during evaluation: PACU  Patient participation: complete - patient participated  Level of consciousness: awake and alert  Airway patency: patent  Nausea & Vomiting: no nausea and no vomiting  Cardiovascular status: blood pressure returned to baseline and hypertensive  Respiratory status: acceptable  Hydration status: euvolemic

## 2022-06-30 NOTE — PROGRESS NOTES
4 Eyes Admission Assessment     I agree as the admission nurse that 2 RN's have performed a thorough Head to Toe Skin Assessment on the patient. ALL assessment sites listed below have been assessed on admission. Areas assessed by both nurses:   [x]   Head, Face, and Ears   [x]   Shoulders, Back, and Chest  [x]   Arms, Elbows, and Hands   [x]   Coccyx, Sacrum, and Ischium  [x]   Legs, Feet, and Heels        Does the Patient have Skin Breakdown?   No         Lukas Prevention initiated:  No   Wound Care Orders initiated:  No      [facility] nurse consulted for Pressure Injury (Stage 3,4, Unstageable, DTI, NWPT, and Complex wounds) or Lukas score 18 or lower:  No      Nurse 1 eSignature: Electronically signed by Neil Bardales RN on 6/30/22 at 5:00 PM EDT    **SHARE this note so that the co-signing nurse is able to place an eSignature**    Nurse 2 eSignature: Electronically signed by Evelyn Fabian RN on 6/30/22 at 6:16 PM EDT

## 2022-07-01 ENCOUNTER — APPOINTMENT (OUTPATIENT)
Dept: VASCULAR LAB | Age: 62
DRG: 473 | End: 2022-07-01
Attending: NEUROLOGICAL SURGERY
Payer: MEDICARE

## 2022-07-01 VITALS
BODY MASS INDEX: 23.99 KG/M2 | OXYGEN SATURATION: 100 % | TEMPERATURE: 97.8 F | DIASTOLIC BLOOD PRESSURE: 79 MMHG | HEART RATE: 91 BPM | WEIGHT: 203.2 LBS | RESPIRATION RATE: 16 BRPM | HEIGHT: 77 IN | SYSTOLIC BLOOD PRESSURE: 146 MMHG

## 2022-07-01 PROBLEM — Z98.1 S/P CERVICAL SPINAL FUSION: Status: ACTIVE | Noted: 2022-07-01

## 2022-07-01 LAB
HCT VFR BLD CALC: 34.2 % (ref 40.5–52.5)
HEMOGLOBIN: 11.3 G/DL (ref 13.5–17.5)
MCH RBC QN AUTO: 26.5 PG (ref 26–34)
MCHC RBC AUTO-ENTMCNC: 33 G/DL (ref 31–36)
MCV RBC AUTO: 80.3 FL (ref 80–100)
PDW BLD-RTO: 15.8 % (ref 12.4–15.4)
PLATELET # BLD: 198 K/UL (ref 135–450)
PMV BLD AUTO: 8.4 FL (ref 5–10.5)
RBC # BLD: 4.26 M/UL (ref 4.2–5.9)
WBC # BLD: 9.2 K/UL (ref 4–11)

## 2022-07-01 PROCEDURE — 36415 COLL VENOUS BLD VENIPUNCTURE: CPT

## 2022-07-01 PROCEDURE — 2580000003 HC RX 258: Performed by: PHYSICIAN ASSISTANT

## 2022-07-01 PROCEDURE — 6360000002 HC RX W HCPCS: Performed by: PHYSICIAN ASSISTANT

## 2022-07-01 PROCEDURE — 97530 THERAPEUTIC ACTIVITIES: CPT

## 2022-07-01 PROCEDURE — 97116 GAIT TRAINING THERAPY: CPT

## 2022-07-01 PROCEDURE — 97535 SELF CARE MNGMENT TRAINING: CPT

## 2022-07-01 PROCEDURE — 6370000000 HC RX 637 (ALT 250 FOR IP): Performed by: PHYSICIAN ASSISTANT

## 2022-07-01 PROCEDURE — 85027 COMPLETE CBC AUTOMATED: CPT

## 2022-07-01 PROCEDURE — 93970 EXTREMITY STUDY: CPT

## 2022-07-01 PROCEDURE — 97162 PT EVAL MOD COMPLEX 30 MIN: CPT

## 2022-07-01 PROCEDURE — 97166 OT EVAL MOD COMPLEX 45 MIN: CPT

## 2022-07-01 RX ORDER — METHOCARBAMOL 750 MG/1
750 TABLET, FILM COATED ORAL EVERY 8 HOURS PRN
Qty: 40 TABLET | Refills: 0 | Status: SHIPPED | OUTPATIENT
Start: 2022-07-01 | End: 2022-07-25

## 2022-07-01 RX ORDER — OXYCODONE HYDROCHLORIDE 5 MG/1
5 TABLET ORAL EVERY 6 HOURS PRN
Qty: 28 TABLET | Refills: 0 | Status: SHIPPED | OUTPATIENT
Start: 2022-07-01 | End: 2022-07-08

## 2022-07-01 RX ORDER — POLYETHYLENE GLYCOL 3350 17 G/17G
17 POWDER, FOR SOLUTION ORAL DAILY
Qty: 527 G | Refills: 1 | COMMUNITY
Start: 2022-07-01 | End: 2022-07-31

## 2022-07-01 RX ORDER — POLYETHYLENE GLYCOL 3350 17 G/17G
17 POWDER, FOR SOLUTION ORAL DAILY
Status: DISCONTINUED | OUTPATIENT
Start: 2022-07-01 | End: 2022-07-01 | Stop reason: HOSPADM

## 2022-07-01 RX ORDER — SENNA AND DOCUSATE SODIUM 50; 8.6 MG/1; MG/1
2 TABLET, FILM COATED ORAL DAILY PRN
Status: DISCONTINUED | OUTPATIENT
Start: 2022-07-01 | End: 2022-07-01 | Stop reason: HOSPADM

## 2022-07-01 RX ORDER — SENNA AND DOCUSATE SODIUM 50; 8.6 MG/1; MG/1
2 TABLET, FILM COATED ORAL DAILY PRN
COMMUNITY
Start: 2022-07-01

## 2022-07-01 RX ADMIN — DIAZEPAM 5 MG: 5 TABLET ORAL at 00:16

## 2022-07-01 RX ADMIN — ACETAMINOPHEN 650 MG: 325 TABLET ORAL at 04:34

## 2022-07-01 RX ADMIN — CEFAZOLIN 2000 MG: 2 INJECTION, POWDER, FOR SOLUTION INTRAMUSCULAR; INTRAVENOUS at 09:30

## 2022-07-01 RX ADMIN — ACETAMINOPHEN 650 MG: 325 TABLET ORAL at 09:30

## 2022-07-01 RX ADMIN — OXYCODONE 5 MG: 5 TABLET ORAL at 06:59

## 2022-07-01 RX ADMIN — ENOXAPARIN SODIUM 40 MG: 100 INJECTION SUBCUTANEOUS at 09:30

## 2022-07-01 RX ADMIN — SODIUM CHLORIDE, PRESERVATIVE FREE 10 ML: 5 INJECTION INTRAVENOUS at 09:30

## 2022-07-01 RX ADMIN — LISINOPRIL 40 MG: 40 TABLET ORAL at 09:30

## 2022-07-01 RX ADMIN — HYDROMORPHONE HYDROCHLORIDE 0.5 MG: 1 INJECTION, SOLUTION INTRAMUSCULAR; INTRAVENOUS; SUBCUTANEOUS at 04:34

## 2022-07-01 RX ADMIN — HYDRALAZINE HYDROCHLORIDE 25 MG: 25 TABLET, FILM COATED ORAL at 09:30

## 2022-07-01 RX ADMIN — METHOCARBAMOL 750 MG: 750 TABLET ORAL at 06:17

## 2022-07-01 RX ADMIN — OXYCODONE 5 MG: 5 TABLET ORAL at 11:27

## 2022-07-01 ASSESSMENT — PAIN SCALES - GENERAL
PAINLEVEL_OUTOF10: 6
PAINLEVEL_OUTOF10: 3
PAINLEVEL_OUTOF10: 7
PAINLEVEL_OUTOF10: 7
PAINLEVEL_OUTOF10: 8
PAINLEVEL_OUTOF10: 6

## 2022-07-01 ASSESSMENT — PAIN DESCRIPTION - FREQUENCY: FREQUENCY: CONTINUOUS

## 2022-07-01 ASSESSMENT — PAIN DESCRIPTION - DESCRIPTORS: DESCRIPTORS: ACHING

## 2022-07-01 ASSESSMENT — PAIN DESCRIPTION - LOCATION: LOCATION: NECK

## 2022-07-01 ASSESSMENT — PAIN DESCRIPTION - ONSET: ONSET: ON-GOING

## 2022-07-01 ASSESSMENT — PAIN DESCRIPTION - ORIENTATION: ORIENTATION: LEFT

## 2022-07-01 ASSESSMENT — PAIN DESCRIPTION - PAIN TYPE: TYPE: SURGICAL PAIN

## 2022-07-01 ASSESSMENT — PAIN - FUNCTIONAL ASSESSMENT: PAIN_FUNCTIONAL_ASSESSMENT: ACTIVITIES ARE NOT PREVENTED

## 2022-07-01 NOTE — PROGRESS NOTES
Pt VSS on room with. AAOx4. Voiding adequately via BRP, denies pain when urinating. Ambulating CGA with gb and walker, denies dizziness. Tolerating oral diet and PO fluids, denies N/V. Skin clean dry intact, with exception to incision (see LDA). No drainage noted, incision cleansed with soap and water, painted with CHG. All fall precautions in place.

## 2022-07-01 NOTE — PROGRESS NOTES
oriented x4    Musculoskeletal:   Gait: Not tested   Tone: normal  Sensory: intact to all extremities  Motor strength:    Right  Left    Right  Left    Deltoid  5 5  Hip Flex  5 5   Biceps  5 5  Knee Extensors  5 5   Triceps  5 5  Knee Flexors  5 5   Wrist Ext  5 5  Ankle Dorsiflex. 5 5   Wrist Flex  5 5  Ankle Plantarflex. 5 5   Handgrip  5 5  Ext Joni Longus  5 5   Thumb Ext  5 5         Incision: intact, clean and dry      Respiratory:  Unlabored respiratory pattern    Abdomen:   Soft, ND       Cardiovascular:  Warm, well perfused    Assessment   Patient is a 64 yo M s/p Procedure(s) (LRB):  C4-T1 ANTERIOR CERVICAL DISCECTOMY AND FUSION (N/A) per Dr. Zeenat Reza:  1. Neurologic exam frequency:q4  2. Mobility:PT/OT eval  3. DVT Prophylaxis: SCDs and lovenox  4. Bowel Regimen:senna and glycolax   5. Pain control:toño and robaxin   6. Incisional Care:open to air  7. Dopplers of LE  8. If dopplers negative and pt does well with PT can be discharged later today. 9. Dispo Planning:poss d/c today    Patient was seen with Dr. Josiah Love who agrees with above assessment and plan. Electronically signed by:  BALA Glez CNP, 7/1/2022 12:06 PM   Neurosurgery Nurse Practitioner  [phone]

## 2022-07-01 NOTE — DISCHARGE INSTR - COC
Continuity of Care Form    Patient Name: Saima Pepper   :  1960  MRN:  [id number]    Admit date:  2022  Discharge date:  ***    Code Status Order: Full Code   Advance Directives:      Admitting Physician:  Christine Anderson. Fredy Magana MD  PCP: Manisha Solomon MD    Discharging Nurse: [facility] Unit/Room#: 0964/4415-81  Discharging Unit Phone Number: ***    Emergency Contact:   Extended Emergency Contact Information  Primary Emergency Contact: Angel Conley  Address:               [address] Phone: [phone]  Relation: Child  Secondary Emergency Contact: sivan tafoya  Home Phone: [phone]  Mobile Phone: [phone]  Relation: Other    Past Surgical History:  Past Surgical History:   Procedure Laterality Date    ARM SURGERY Left 2020    LEFT ULNAR NERVE DECOMPRESSION (AT ELBOW) AND LEFT CARPAL TUNNEL RELEASE performed by Sandy Paulson MD at [place] [address] Right 10/13/2020    RIGHT ULNAR NERVE DECOMPRESSION AT ELBOW AND RIGHT CARPAL TUNNEL RELEASE performed by Sandy Paulson MD at [address] [place] [place] [address]      2015    [facility] N/A 2022    C4-T1 ANTERIOR CERVICAL DISCECTOMY AND FUSION performed by Christine Anderson.  Fredy Magana MD at [place] [address] 12/10/2021    COLONOSCOPY POLYPECTOMY SNARE/COLD BIOPSY performed by Ishmael Hutchinson MD at [facility] N/A 2022    COLONOSCOPY DIAGNOSTIC performed by Ishmael Hutchinson MD at [address] [place] [address], COLON, DIAGNOSTIC      LUMBAR FUSION  2017    PLIF L3-S1 done in [place], [place]       Immunization History:   Immunization History   Administered Date(s) Administered    COVID-19, PFIZER PURPLE top, DILUTE for use, (age 15 y+), 30mcg/0.3mL 03/10/2021, 2021, 2021    Influenza Vaccine, unspecified formulation 10/06/2014    Influenza Virus Vaccine 2013, 10/06/2014, 10/06/2014, 2015    Influenza, MDCK Quadv, IM, PF (Flucelvax 2 yrs and older) 10/06/2014, 09/27/2021    Influenza, Yi Saint, IM, PF (6 mo and older Fluzone, Flulaval, Fluarix, and 3 yrs and older Afluria) 10/01/2018, 11/12/2019, 09/13/2020    Td vaccine (adult) 10/31/1995    Tdap (Boostrix, Adacel) 12/27/2013, 06/15/2018, 07/02/2020    Zoster Recombinant (Shingrix) 02/05/2019, 04/23/2019       Active Problems:  Patient Active Problem List   Diagnosis Code    TISHA positive R76.8    DDD (degenerative disc disease), cervical M50.30    DDD (degenerative disc disease), lumbar M51.36    Depression F32. A    Helicobacter pylori infection A04.8    Hand arthritis M19.049    Hypertension I10    Hypertriglyceridemia E78.1    Left arm weakness R29.898    Left low back pain M54.50    Neck pain on left side M54.2    Right wrist pain M25.531    SS-A antibody positive R76.8    SS-B antibody positive R76.8    Postlaminectomy syndrome, lumbar M96.1    Chronic pain syndrome G89.4    Synovitis of wrist M65.9    Cervical disc herniation M50.20    Gouty arthritis M10.9    Pre-diabetes R73.03    Pure hypercholesterolemia E78.00    Spinal stenosis, cervical region M48.02    Cervical stenosis of spinal canal M48.02    S/P cervical spinal fusion Z98.1       Isolation/Infection:   Isolation            No Isolation          Patient Infection Status       None to display            Nurse Assessment:  Last Vital Signs: BP (!) 146/79   Pulse 91   Temp 97.8 °F (36.6 °C) (Oral)   Resp 16   Ht 6' 5\" (1.956 m)   Wt 203 lb 3.2 oz (92.2 kg)   SpO2 100%   BMI 24.10 kg/m²     Last documented pain score (0-10 scale): Pain Level: 3  Last Weight:   Wt Readings from Last 1 Encounters:   06/30/22 203 lb 3.2 oz (92.2 kg)     Mental Status:  {IP PT MENTAL STATUS:20030}    IV Access:  {[facility] IV ACCESS:866047385}    Nursing Mobility/ADLs:  Walking   {[facility] VPTB:323677034}  Transfer  {[facility] KIDU:904646902}  Bathing  {CHP DME SSZZ:771582408}  Dressing  {CHP DME FLYV:034522815}  Toileting  {CHP DME [facility]:156977799}  Feeding  {CHP DME UEPI:526359432}  Med Admin  {CHP DME FDZO:919192642}  Med Delivery   { [facility] Delivery:419683443}    Wound Care Documentation and Therapy:  Incision 22 Neck Anterior (Active)   Dressing Status Clean;Dry; Intact 22 1500   Incision Cleansed Cleansed with saline 22 1041   Dressing/Treatment Surgical glue; Open to air 22 1500   Closure Surgical glue 22 1500   Margins Approximated 22 1500   Incision Assessment Dry 22 1500   Drainage Amount None 22 1500   Odor None 22 1845   Franci-incision Assessment Intact 22 1845   Number of days: 1        Elimination:  Continence: Bowel: {YES / RZ:45762}  Bladder: {YES / JQ:36898}  Urinary Catheter: {Urinary Catheter:983738057}   Colostomy/Ileostomy/Ileal Conduit: {YES / UA:26651}       Date of Last BM: ***    Intake/Output Summary (Last 24 hours) at 2022 1524  Last data filed at 2022 1230  Gross per 24 hour   Intake 1660 ml   Output 100 ml   Net 1560 ml     I/O last 3 completed shifts:   In: 3166 [P.O.:960; I.V.:2206]  Out: 1750 [Urine:1600; Emesis/NG output:100; Blood:50]    Safety Concerns:     [address] Safety Concerns:944144239}    Impairments/Disabilities:      [address] Impairments/Disabilities:083107105}    Nutrition Therapy:  Current Nutrition Therapy:   [address] Diet List:474692622}    Routes of Feeding: {CHP DME Other Feedings:352850880}  Liquids: {Slp liquid thickness:10425}  Daily Fluid Restriction: {CHP DME Yes amt example:388421683}  Last Modified Barium Swallow with Video (Video Swallowing Test): {Done Not Done KRQL:515259878}    Treatments at the Time of Hospital Discharge:   Respiratory Treatments: ***  Oxygen Therapy:  {Therapy; copd oxygen:59067}  Ventilator:    { CC Vent RFVA:259173401}    Rehab Therapies: {THERAPEUTIC INTERVENTION:1218852127}  Weight Bearing Status/Restrictions: [address]  Weight Bearin}  Other Medical Equipment (for information only, NOT a DME order): {EQUIPMENT:486801094}  Other Treatments: ***    Patient's personal belongings (please select all that are sent with patient):  {CHP DME Belongings:407742879}    RN SIGNATURE:  {Esignature:933090812}    CASE MANAGEMENT/SOCIAL WORK SECTION    Inpatient Status Date: ***    Readmission Risk Assessment Score:  Readmission Risk              Risk of Unplanned Readmission:  10           Discharging to Facility/ Agency   Name: Coty Grijalva  Address: [address] , [facility] [address]  IFCKT:[phone]  KV:[phone]    Dialysis Facility (if applicable)   Name:  Address:  Dialysis Schedule:  Phone:  Fax:    / signature: Electronically signed by CYNTHIA Campos on 7/1/22 at 3:42 PM EDT    PHYSICIAN SECTION    Prognosis: {Prognosis:7347285517}    Condition at Discharge: [address] [place] [address] Patient Condition:031647434}    Rehab Potential (if transferring to Rehab): {Prognosis:3993238676}    Recommended Labs or Other Treatments After Discharge: ***    Physician Certification: I certify the above information and transfer of Saima Pepper  is necessary for the continuing treatment of the diagnosis listed and that he requires {Admit to Appropriate Level of Care:36546} for {GREATER/LESS:256295913} 30 days.      Update Admission H&P: {CHP DME Changes in HHJQI:683370863}    PHYSICIAN SIGNATURE:  {Esignature:953760036}

## 2022-07-01 NOTE — CARE COORDINATION
CM following: CM met with patient at bedside. CM and patient discussed PT/OT recs of HHC and use of a rolling walker. Patient states he does not have a rolling walker at home. Rachael representative contacted and reports she will deliver a rolling walker to the patient's room in the next 20 minutes. Patient reports that he is in agreement with [place] [address] referral and had no preference on [place] [address] provider. CM reached out to Denisse Jessica  with [facility] for [place] [address] referral and Denisse Jessica will call the CM back with an accepting [place] [address] agency. Denisse Jessica aware of patient discharge today.   Electronically signed by CYNTHIA Navarro on 7/1/2022 at 3:16 PM  [phone]

## 2022-07-01 NOTE — PROGRESS NOTES
Body Structures, Functions, Activity Limitations Requiring Skilled Therapeutic Intervention: Decreased functional mobility ; Decreased strength;Decreased safe awareness; Increased pain  Assessment: Pt is a 65 y.o. male s/p cervical discectomy and fusion presenting with decreased functional mobility. Pt is currently requiring CGA for all OOB mobility, which is a decline from reported baseline. Session limited due to need for LE dopplers. Anticipate good progress throughout hospital stay. Pt will benefit from skilled therapy to maximize safety and independence. PT recommends initial 24 hr supervision/assist with use of RW and HHPT for increased safety. Treatment Diagnosis: decreased functional mobility s/p cervical discectomy and fusion  Therapy Prognosis: Good  Decision Making: Medium Complexity  Requires PT Follow-Up: Yes  Activity Tolerance  Activity Tolerance: Patient tolerated treatment well;Patient limited by pain     Plan   Plan  Plan: 5-7 times per week  Current Treatment Recommendations: Strengthening,ROM,Balance training,Functional mobility training,Transfer training,Endurance training,Gait training,Stair training,Neuromuscular re-education,Home exercise program,Safety education & training,Patient/Caregiver education & training,Equipment evaluation, education, & procurement  Safety Devices  Type of Devices:  All fall risk precautions in place,Bed alarm in place,Call light within reach,Gait belt,Left in bed,Nurse notified     Restrictions  Position Activity Restriction  Other position/activity restrictions: spinal precautions, ambulate pt, activity as tolerated     Subjective   Pain: Pt reports pain in his neck \"like a sore throat\" and 9/10 B lower leg pain, pt states his RN just provided pain medicine  General  Chart Reviewed: Yes  Patient assessed for rehabilitation services?: Yes  Additional Pertinent Hx: Pt is a 65 y.o. male admitted to [facility] on 6/30/22 s/p C4-T1 anterior cervical discectomy and fusion. PMH: arthritis, HLD, HTN. Family / Caregiver Present: No  Referring Practitioner: JARETH Kahn  Diagnosis: cervical stenosis of spine  Follows Commands: Within Functional Limits  General Comment  Comments: Pt found supine in bed upon PT arrival.  Pt agreeable to therapy session. Subjective  Subjective: \"My legs are hurting. \"         Social/Functional History  Social/Functional History  Lives With: Daughter  Type of Home: House  Home Layout: Able to Live on Main level with bedroom/bathroom  Home Access: Stairs to enter with rails  Entrance Stairs - Number of Steps: 4 RAQUEL with RHR  Bathroom Shower/Tub: Tub/Shower unit (pt stands)  Bathroom Toilet: Standard  Bathroom Equipment:  (none)  Bathroom Accessibility: Walker accessible  Home Equipment:  (walking stick)  Has the patient had two or more falls in the past year or any fall with injury in the past year?: No  ADL Assistance: Independent  Homemaking Assistance: Independent (share responsibility)  Ambulation Assistance: Independent (with walking stick)  Transfer Assistance: Independent  Active : No  Patient's  Info: family drives  Occupation: On disability  Leisure & Hobbies: playing with grandkids  Additional Comments: Pt reports that he is discharging to his mother's house. 6 RAQUEL with RHR to enter building, elevator to reach her apartment. One floor apartment. Walker accessible. Bathroom is accessible. Pt assist mother with grocery shopping. Pt reports that he will not have physical assist at d/c, but his mother is able to supervise.   Vision/Hearing  Vision  Vision: Impaired  Vision Exceptions: Wears glasses at all times  Hearing  Hearing: Within functional limits    Cognition   Orientation  Overall Orientation Status: Within Functional Limits     Objective   Heart Rate: 79  Heart Rate Source: Monitor  BP: (!) 155/82  MAP (Calculated): 106.33  Resp: 16  SpO2: 97 %  O2 Device: None (Room air)     Observation/Palpation  Observation: Pt reporting B calf pain. No redness or warmth noted, however pt reports tenderness with palpation and pressure. RN notified. Pt requesting to use restroom. Per RN, ok to ambulate to restroom, but RN would like pt to return to bed and not ambulate in hallway after bathroom pending doppler results. AROM RLE (degrees)  RLE AROM: WFL  AROM LLE (degrees)  LLE AROM : WFL  Strength RLE  R Hip Flexion: 4+/5  R Knee Flexion: 5/5  R Knee Extension: 5/5  R Ankle Dorsiflexion: 5/5  Strength LLE  L Hip Flexion: 4+/5  L Knee Flexion: 5/5  L Knee Extension: 5/5  L Ankle Dorsiflexion: 5/5           Bed mobility  Supine to Sit: Stand by assistance (HOB elevated, used of bedrails)  Sit to Supine: Stand by assistance (HOB elevated, use of bedrails)  Transfers  Sit to Stand: Contact guard assistance (from EOB and recliner to RW, cues for hand placement)  Stand to sit: Contact guard assistance (cues for hand placement)  Ambulation  Surface: level tile  Device: Rolling Walker  Other Apparatus:  (Cervical collar)  Assistance: Contact guard assistance  Quality of Gait: decreased nadia, poor RW management, decreased B step height/length, forward trunk flexion  Distance: 10' + 20'  Comments: Cues for upright posture and RW management, espeically to not abandon RW when approaching toilet, sink, or chair. More Ambulation?: Yes  Ambulation 2  Surface - 2: level tile  Device 2:  (walking stick)  Other Apparatus 2:  (cervical collar)  Assistance 2: Contact guard assistance  Quality of Gait 2: decreased nadia, unsteady, decreased B step height/length, B knee instability, no buckling noted  Distance: 20'  Comments: Pt initially insisting on ambulating with walking stick, however was agreeable to use RW after trial and increased stability with RW.   Stairs/Curb  Stairs?: No (RN requested pt return to bed for dopplers prior to attempt)     Balance  Comments: CGA for dynamic standing balance at toilet for urination and at sink for hand hygiene with no UE support. OutComes Score                                                  AM-PAC Score  AM-PAC Inpatient Mobility Raw Score : 19 (07/01/22 1124)  AM-PAC Inpatient T-Scale Score : 45.44 (07/01/22 1124)  Mobility Inpatient CMS 0-100% Score: 41.77 (07/01/22 1124)  Mobility Inpatient CMS G-Code Modifier : CK (07/01/22 1124)          Goals  Short Term Goals  Time Frame for Short term goals: Discharge  Short term goal 1: Pt will perform all bed mobility with Ramone  Short term goal 2: Pt will perform sit to/from stand with RW and Ramone  Short term goal 3: Pt will ambulate 150' with RW and supervision  Short term goal 4: Pt will ascend/descend 6 stairs with unilateral HR and SBA  Patient Goals   Patient goals : \"To get this pain to go away. \"       Education  Patient Education  Education Given To: Patient  Education Provided: Role of Therapy;Plan of Care  Education Method: Verbal  Education Outcome: Verbalized understanding      Therapy Time   Individual Concurrent Group Co-treatment   Time In 1022         Time Out 1048         Minutes 26              Timed Code Treatment Minutes:   11    Total Treatment Minutes:  Magali Rose 77, PT    This note to serve as discharge summary if patient discharged before next session.

## 2022-07-01 NOTE — PLAN OF CARE
Problem: Discharge Planning  Goal: Discharge to home or other facility with appropriate resources  7/1/2022 0402 by Carlos Mitchell RN  Outcome: Progressing  Flowsheets (Taken 6/30/2022 1840 by Malik Chu RN)  Discharge to home or other facility with appropriate resources: Identify barriers to discharge with patient and caregiver  6/30/2022 1659 by Malik Chu RN  Outcome: Progressing     Problem: Pain  Goal: Verbalizes/displays adequate comfort level or baseline comfort level  7/1/2022 0402 by Carlos Mitchell RN  Outcome: Progressing  6/30/2022 1659 by Malik Chu RN  Outcome: Progressing     Problem: Safety - Adult  Goal: Free from fall injury  7/1/2022 0402 by Carlos Mitchell RN  Outcome: Progressing  6/30/2022 1659 by Malik Chu RN  Outcome: Progressing     Problem: ABCDS Injury Assessment  Goal: Absence of physical injury  Outcome: Progressing

## 2022-07-01 NOTE — PLAN OF CARE
Problem: Pain  Goal: Verbalizes/displays adequate comfort level or baseline comfort level  7/1/2022 1443 by Ariel Bangura RN  Outcome: Progressing   Pt endorsing pain to cervical spine. Being treated with PRN pain medications, rest, and repositioning, with some relief. Problem: Safety - Adult  Goal: Free from fall injury  7/1/2022 1443 by Ariel Bangura RN  Outcome: Progressing   All fall precautions in place. Bed locked and in lowest position with alarm on. Overbed table and personal belonings within reach. Call light within reach and patient instructed to use call light for assistance. Non-skid socks on.

## 2022-07-01 NOTE — PROGRESS NOTES
Occupational Therapy  Facility/Department: [facility] 5T ORTHO/NEURO  Occupational Therapy Initial Assessment/ Treatment    Name: Carmen Wilkerson  : 1960  MRN: [id number]  Date of Service: 2022    Discharge Recommendations:    Carmen Wilkerson scored a 21/24 on the AM-PAC ADL Inpatient form. Current research shows that an AM-PAC score of 18 or greater is typically associated with a discharge to the patient's home setting. Based on the patient's AM-PAC score, and their current ADL deficits, it is recommended that the patient have 2-3 sessions per week of Occupational Therapy at d/c to increase the patient's independence. At this time, this patient demonstrates the endurance and safety to discharge home with home vs OP services and a follow up treatment frequency of 2-3x/wk. Please see assessment section for further patient specific details. If patient discharges prior to next session this note will serve as a discharge summary. Please see below for the latest assessment towards goals. Patient Diagnosis(es): There were no encounter diagnoses. Past Medical History:  has a past medical history of Arthritis, Arthropathy, Back pain, Depression, Gout, Hyperlipidemia, Hypertension, and Wears dentures. Past Surgical History:  has a past surgical history that includes Colonoscopy; Endoscopy, colon, diagnostic; lumbar fusion (2017); Arm Surgery (Left, 2020); Arm Surgery (Right, 10/13/2020); back surgery; Colonoscopy (N/A, 12/10/2021); Colonoscopy (N/A, 2022); and cervical fusion (N/A, 2022). Treatment Diagnosis: decreased ADLs and transfers secondary to stenosis of spine      Assessment   Performance deficits / Impairments: Decreased functional mobility ; Decreased endurance;Decreased coordination;Decreased ADL status; Decreased balance;Decreased strength;Decreased high-level IADLs  Assessment: Prior to admission pt was living at home with his family, now presents s/p cervical surgery.  Now demonstrating CGA for mobiltiy and transfers, SBA for standing balance. Pt would benefit from continued OT while in acute care, AMPAC score indicates homebound discharge. Pt plans to dc to mother's home which is RW accessable, will have assistance for IADLs. Continue OT POc. Treatment Diagnosis: decreased ADLs and transfers secondary to stenosis of spine  Prognosis: Fair  Decision Making: Medium Complexity  Activity Tolerance  Activity Tolerance: Patient Tolerated treatment well        Plan   Plan  Times per Week: 5-7  Times per Day: Daily  Current Treatment Recommendations: Strengthening,Functional mobility training,Endurance training,Neuromuscular re-education,Patient/Caregiver education & training,Safety education & training,Self-Care / ADL     Restrictions  Position Activity Restriction  Other position/activity restrictions: spinal precautions, ambulate pt, activity as tolerated    Subjective   General  Chart Reviewed: Yes  Patient assessed for rehabilitation services?: Yes  Additional Pertinent Hx: Pt admitted with c/o B UE pain (L>R). MRI: multilevel spinal stenosis from C3-T1 and superimposed disc herniation C7-T1. Underwent C4-T1 ANTERIOR CERVICAL DISCECTOMY AND FUSION on 6/30. PMHx includes:  Arthritis, Arthropathy, Back pain, Depression, Gout, Hyperlipidemia, Hypertension, and Wears dentures. Family / Caregiver Present: No  Referring Practitioner: JARETH Velásquez  Diagnosis: cervical stenosis of spine  Subjective  Subjective: Pt long sitting in bed upon arrival, agreeable to OT eval and treat.  \"I have to go to the bathroom\"  Pt reports pain in his neck \"like a sore throat\" and 9/10 B lower leg pain, pt states his RN just provided pain medicine  Social/Functional History  Social/Functional History  Lives With: Daughter  Type of Home: House  Home Layout: Able to Live on Main level with bedroom/bathroom  Home Access: Stairs to enter with rails  Entrance Stairs - Number of Steps: 4 RAQUEL with RHR  Bathroom Shower/Tub: Tub/Shower unit (pt stands)  Bathroom Toilet: Standard  Bathroom Equipment:  (none)  Bathroom Accessibility: Walker accessible  Home Equipment:  (walking stick)  Has the patient had two or more falls in the past year or any fall with injury in the past year?: No  ADL Assistance: Independent  Homemaking Assistance: Independent (share responsibility)  Ambulation Assistance: Independent (with walking stick)  Transfer Assistance: Independent  Active : No  Patient's  Info: family drives  Occupation: On disability  Leisure & Hobbies: playing with grandkids  Additional Comments: Pt reports that he is discharging to his mother's house. 6 RAQUEL with RHR to enter building, elevator to reach her apartment. One floor apartment. Walker accessible. Bathroom is accessible. Pt assist mother with grocery shopping. Pt reports that he will not have physical assist at d/c, but his mother is able to supervise. Objective             Observation/Palpation  Observation: Pt reporting B calf pain. No redness or warmth noted, however pt reports tenderness with palpation and pressure. RN notified. Pt requesting to use restroom. Per RN, ok to ambulate to restroom, but RN would like pt to return to bed and not ambulate in hallway after bathroom pending doppler results. Safety Devices  Type of Devices: All fall risk precautions in place; Bed alarm in place;Call light within reach;Gait belt;Left in bed;Nurse notified           ADL  Feeding: Beverage management;Setup  Grooming: Stand by assistance  Grooming Skilled Clinical Factors: standing at sink for hand hygiene  LE Dressing: Stand by assistance  Toileting: Stand by assistance  Toileting Skilled Clinical Factors: standing at toilet     Activity Tolerance  Activity Tolerance: Patient tolerated treatment well;Patient limited by pain  Bed mobility  Supine to Sit: Stand by assistance  Sit to Supine: Stand by assistance  Transfers  Sit to stand: Stand by assistance  Stand to sit: Stand by assistance  Transfer Comments: CGA with RW into bathroom. Used walking stick out of bathroom. Noted increased time for mobiltiy out of bathroom with walking stick vs RW. Cognition  Overall Cognitive Status: WFL  Orientation  Overall Orientation Status: Within Functional Limits                  Education Given To: Patient; Family  Education Provided: Role of Therapy;Plan of Care  Education Method: Verbal  Barriers to Learning: None  Education Outcome: Demonstrated understanding  LUE AROM (degrees)  LUE AROM : WFL  LUE General AROM: effortful for shoulder flexion  RUE AROM (degrees)  RUE General AROM: effortful for shoulder flexion                               AM-PAC Score        AM-PAC Inpatient Daily Activity Raw Score: 21 (07/01/22 1203)  AM-PAC Inpatient ADL T-Scale Score : 44.27 (07/01/22 1203)  ADL Inpatient CMS 0-100% Score: 32.79 (07/01/22 1203)  ADL Inpatient CMS G-Code Modifier : CJ (07/01/22 1203)    Goals  Short Term Goals  Time Frame for Short term goals: by dc  Short Term Goal 1: Pt will complete LB dressing with mod I  Short Term Goal 2: Pt will complete standing level grooming at mod I  Short Term Goal 3: Pt will complete functional mobility with mod I and AE PRN  Patient Goals   Patient goals : to go home       Therapy Time   Individual Concurrent Group Co-treatment   Time In 1020         Time Out 1049         Minutes 29         Timed Code Treatment Minutes: 14 Minutes (+15 min eval)     Cris BELCHER  [address] [facility], OTR/L [id number]

## 2022-07-01 NOTE — PROGRESS NOTES
With 1 assist, gait belt and walker patient ambulated to bathroom. Denies any dizziness, nausea, SOB or chest pain.

## 2022-07-01 NOTE — PROGRESS NOTES
Pt to d/c home. D/c packet fully reviewed with Pt and mother in law at bedside. 1 PIV removed. All belongings with Pt.

## 2022-07-01 NOTE — CARE COORDINATION
Case Management Assessment            Discharge Note                    Date / Time of Note: 7/1/2022 3:40 PM                  Discharge Note Completed by: CYNTHIA Juárez    Patient Name: Jeremiah Hare   YOB: 1960  Diagnosis: Cervical stenosis of spine [M48.02]  Cervical radiculopathy [M54.12]  Cervical stenosis of spinal canal [M48.02]   Date / Time: 6/30/2022  6:07 AM    Current PCP: Gabe Rascon MD  Clinic patient: No    Hospitalization in the last 30 days: No    Advance Directives:  Code Status: Full Code  [place] DNR form completed and on chart: Not Indicated    Financial:  Payor: Eerndira Hall / Plan: Nuria Ness / Product Type: *No Product type* /      Pharmacy:    [address] [phone] [facility] [phone] [address] [facility] [phone]  Phone: [phone] Fax: [address], [address] [place] [phone] [facility] [phone]  [address] [address]  Phone: [phone] Fax: [phone]      Assistance purchasing medications?: Potential Assistance Purchasing Medications: No  Assistance provided by Case Management: None at this time    Does patient want to participate in local refill/ meds to beds program?: Yes    Meds To Beds General Rules:  1. Can ONLY be done Monday- Friday between 8:30am-5pm  2. Prescription(s) must be in pharmacy by 3pm to be filled same day  3. Copy of patient's insurance/ prescription drug card and patient face sheet must be sent along with the prescription(s)  4. Cost of Rx cannot be added to hospital bill. If financial assistance is needed, please contact unit  or ;  or  CANNOT provide pharmacy voucher for patients co-pays  5.  Patients can then  the prescription on their way out of the hospital at discharge, or pharmacy can deliver to the bedside if staff is available. (payment due at time of pick-up or delivery - cash, check, or card accepted)     Able to afford home medications/ co-pay costs: Yes    ADLS:  Current PT AM-PAC Score: 19 /24  Current OT AM-PAC Score: 21 /24      DISCHARGE Disposition: [facility] [address]: [address]     LOC at discharge: Not Applicable  JARRELL Completed: Yes    Notification completed in HENS/PAS?:  Not Applicable    IMM Completed:   Not Indicated    Transportation:  Transportation PLAN for discharge: family - mother-in-law  Mode of Transport: Private Car  Reason for medical transport: Not Applicable  Name of Transport Company: Not Applicable  Time of Transport: n/a    Transport form completed: Not Indicated    Home Care:  [address] ordered at discharge: Josiah Baig: Nancy [address]  Phone: [phone]  Fax: [phone]  Orders faxed: Yes    Durable Medical Equipment:  DME Provider: Waynette Spurling obtained during hospitalization: rolling walker    Home Oxygen and Respiratory Equipment:  Oxygen needed at discharge?: Not [address]: Not Applicable  Portable tank available for discharge?: Not Indicated    Dialysis:  Dialysis patient: No    Dialysis Center:  Not Applicable    Referrals made at [facility] for outpatient continued care:  Not Applicable    Additional CM Notes: Patient is from home independent and lives with his daughter. Patient will discharge to his mother-in-law's home. Patient expressed need for rolling walker - [employer] to provide to room. HHC is arranged through [address] [facility]. Patient reports no other CM needs at discharge. Patient states his mother-in-law will provide transportation to her home at discharge.     The Plan for Transition of Care is related to the following treatment goals of Cervical stenosis of spine [M48.02]  Cervical radiculopathy [M54.12]  Cervical stenosis of spinal canal [M48.02]    The Patient and/or patient

## 2022-07-01 NOTE — DISCHARGE SUMMARY
Discharge Summary    Date of Admission: 6/30/2022  6:07 AM  Date of Discharge: 7/1/22  Admission Diagnosis: Cervical stenosis of spine [M48.02] Cervical radiculopathy [M54.12]  Discharge Diagnosis: Same   Condition on Discharge: good  Attending for Admission: Quique Calvillo. Josiah Love MD  Procedures: Procedure(s) (LRB):  C4-T1 ANTERIOR CERVICAL DISCECTOMY AND FUSION (N/A)  Consults: IP CONSULT TO HOME CARE NEEDS    Reason for Admission:  David Slater is a 65 y.o. male patient who was admitted to the hospital for complaints of neck pain and bilat arm pain. He underwent the procedure listed above on 6/30/22. Hospital Course:  After surgery, His pre-operative bilat arm pain was Absent . He complained of sore throat. The pain was well-controlled on oral medications. His brace was in place. The incision was clean, dry and intact. There was no erythema or edema around the surgical site. Prior to discharge He was eating well, urinating and ambulating with a steady gait.     Discharge Vitals/Labs:  BP (!) 146/79   Pulse 91   Temp 97.8 °F (36.6 °C) (Oral)   Resp 16   Ht 6' 5\" (1.956 m)   Wt 203 lb 3.2 oz (92.2 kg)   SpO2 100%   BMI 24.10 kg/m²   CBC:   Lab Results   Component Value Date/Time    WBC 9.2 07/01/2022 06:00 AM    RBC 4.26 07/01/2022 06:00 AM    HGB 11.3 07/01/2022 06:00 AM    HCT 34.2 07/01/2022 06:00 AM    MCV 80.3 07/01/2022 06:00 AM    MCH 26.5 07/01/2022 06:00 AM    MCHC 33.0 07/01/2022 06:00 AM    RDW 15.8 07/01/2022 06:00 AM     07/01/2022 06:00 AM    MPV 8.4 07/01/2022 06:00 AM     CMP:    Lab Results   Component Value Date/Time     06/21/2022 10:00 AM    K 4.5 06/21/2022 10:00 AM     06/21/2022 10:00 AM    CO2 23 06/21/2022 10:00 AM    BUN 12 06/21/2022 10:00 AM    CREATININE 1.0 06/21/2022 10:00 AM    GFRAA >60 06/21/2022 10:00 AM    AGRATIO 1.3 08/14/2020 10:03 AM    LABGLOM >60 06/21/2022 10:00 AM    GLUCOSE 88 06/21/2022 10:00 AM    PROT 8.0 08/14/2020 10:03 AM LABALBU 4.5 08/14/2020 10:03 AM    CALCIUM 9.5 06/21/2022 10:00 AM    BILITOT <0.2 08/14/2020 10:03 AM    ALKPHOS 71 08/14/2020 10:03 AM    AST 15 06/21/2022 10:00 AM    ALT 16 09/27/2021 11:57 AM       Discharge Medications: The patient suffers from a major neurological surgery that pain cannot be managed within an average of 30 MED per day. Severe acute postoperative pain is the reason for exceeding the 30 MED average, and the prescription reflects the same dosage patient received while inpatient, which is the lowest dose consistent with the patients medical condition. Non-opioid treatment options have been considered prior to prescribing opioids, and the patient has been advised of the benefits and risks of the opioid (including the potential for addiction). Medication List      START taking these medications    methocarbamol 750 MG tablet  Commonly known as: ROBAXIN  Take 1 tablet by mouth every 8 hours as needed (spasms)     oxyCODONE 5 MG immediate release tablet  Commonly known as: ROXICODONE  Take 1 tablet by mouth every 6 hours as needed for Pain for up to 7 days.      polyethylene glycol 17 g packet  Commonly known as: GLYCOLAX  Take 17 g by mouth daily     sennosides-docusate sodium 8.6-50 MG tablet  Commonly known as: SENOKOT-S  Take 2 tablets by mouth daily as needed for Constipation        CONTINUE taking these medications    allopurinol 300 MG tablet  Commonly known as: ZYLOPRIM  Take 1 tablet by mouth daily For arthritis stop allopurinol 100 mg     amLODIPine 10 MG tablet  Commonly known as: NORVASC  Take 1 tablet by mouth daily     atorvastatin 20 MG tablet  Commonly known as: LIPITOR  Take 1 tablet by mouth daily To prevent heart disease     hydrALAZINE 25 MG tablet  Commonly known as: APRESOLINE  Take one tablet   Twice a day for blood pressure     lisinopril 40 MG tablet  Commonly known as: PRINIVIL;ZESTRIL  Take 1 tablet by mouth daily        STOP taking these medications    tiZANidine 4 MG tablet  Commonly known as: Sung Travis           Where to Get Your Medications      These medications were sent to [place], [address] Karina Lili [phone] - F [phone] [address] [facility] [address]    Phone: [phone]   · methocarbamol 750 MG tablet  · oxyCODONE 5 MG immediate release tablet     You can get these medications from any pharmacy    You don't need a prescription for these medications  · polyethylene glycol 17 g packet  · sennosides-docusate sodium 8.6-50 MG tablet         Discharge Destination:  The patient was discharged to Home. Follow-up:  The patient is to follow-up with Mearl Ormond. Geovanna Santoyo MD in the office in 2 weeks      Discharge Instructions:   Verbal and written discharge instructions were given to the patient at the time of discharge. No NSAIDS or anticoagulation for 1 week post-operatively. No driving or riding in a motor vehicle. No lifting or bending    Electronically signed by:  BALA Peters CNP, APRN-CNP, 7/1/2022 2:48 PM  [phone]

## 2022-07-01 NOTE — CARE COORDINATION
[facility] unable to staff in timely manner. [facility] accepted referral and will pull docs from Epic. Electronically signed by Les Castillo LPN on 4/9/43 at 1:71 PM EDT

## 2022-07-12 ENCOUNTER — NURSE TRIAGE (OUTPATIENT)
Dept: OTHER | Facility: CLINIC | Age: 62
End: 2022-07-12

## 2022-07-12 NOTE — TELEPHONE ENCOUNTER
Received call from [place] at [facility] with Red Flag Complaint. Subjective: Caller states \"Every time I am in pain my blood pressure shoots up\"     Reports blood pressure as 176/111 at physical therapy 5 minutes ago    States had surgery 6/30/2022 neck surgery         Speaking in complete sentences, speech is clear    Reports no missed BP med doses    Denies - chest pain / shortness of breath / numbness / tingling / new weakness / headache  / double vision / loss of vision        Onset: 5 minutes ago;         Pain Severity: 9/10; Temperature: denies     Recommended disposition: See PCP within 3 Days    Care advice provided, patient verbalizes understanding; denies any other questions or concerns; instructed to call back for any new or worsening symptoms. Patient/Caller agrees with recommended disposition; writer provided warm transfer to [facility] for appointment scheduling     Attention Provider: Thank you for allowing me to participate in the care of your patient. The patient was connected to triage in response to information provided to the ECC/PSC. Please do not respond through this encounter as the response is not directed to a shared pool.         Reason for Disposition   Systolic BP >= 421 OR Diastolic >= 863    Protocols used: BLOOD PRESSURE - HIGH-ADULT-OH

## 2022-07-13 ENCOUNTER — OFFICE VISIT (OUTPATIENT)
Dept: PRIMARY CARE CLINIC | Age: 62
End: 2022-07-13
Payer: MEDICARE

## 2022-07-13 VITALS
RESPIRATION RATE: 18 BRPM | SYSTOLIC BLOOD PRESSURE: 162 MMHG | WEIGHT: 204 LBS | BODY MASS INDEX: 24.19 KG/M2 | HEART RATE: 98 BPM | DIASTOLIC BLOOD PRESSURE: 102 MMHG | TEMPERATURE: 98.3 F

## 2022-07-13 DIAGNOSIS — M48.02 SPINAL STENOSIS, CERVICAL REGION: ICD-10-CM

## 2022-07-13 DIAGNOSIS — Z98.1 S/P CERVICAL SPINAL FUSION: ICD-10-CM

## 2022-07-13 DIAGNOSIS — I10 PRIMARY HYPERTENSION: ICD-10-CM

## 2022-07-13 PROCEDURE — 99214 OFFICE O/P EST MOD 30 MIN: CPT | Performed by: FAMILY MEDICINE

## 2022-07-13 RX ORDER — HYDRALAZINE HYDROCHLORIDE 100 MG/1
TABLET, FILM COATED ORAL
Qty: 60 TABLET | Refills: 1 | Status: SHIPPED | OUTPATIENT
Start: 2022-07-13

## 2022-07-13 ASSESSMENT — ENCOUNTER SYMPTOMS
DIARRHEA: 0
TROUBLE SWALLOWING: 0
VOICE CHANGE: 0
SHORTNESS OF BREATH: 0
BLOOD IN STOOL: 0
CONSTIPATION: 0
ABDOMINAL PAIN: 0

## 2022-07-13 NOTE — PROGRESS NOTES
2022     David Slater (:  1960) is a 65 y.o. male, here for evaluation of the following medical concerns:    HPI  65years old the male patient with history of cervical spinal stenosis status post fusion 2022  by Dr. Arun Gomes and patient is still on  cervical collar. Patient reported that the neck collar was somewhat uncomfortable. He has hypertension and his blood pressure is creeping up at 880 systolic. He takes hydralazine 25 mg twice daily, amlodipine 10 mg daily and lisinopril 40 mg daily. He denies chest pain or shortness of breath denies bowel or urinary disturbance ,denies leg edema    Review of Systems   Constitutional: Negative for activity change. HENT: Negative for trouble swallowing and voice change. Eyes: Negative for visual disturbance. Respiratory: Negative for shortness of breath. Cardiovascular: Negative for chest pain and leg swelling. Gastrointestinal: Negative for abdominal pain, blood in stool, constipation and diarrhea. Genitourinary: Negative for difficulty urinating, dysuria, frequency, hematuria and scrotal swelling. Musculoskeletal: Negative for arthralgias and myalgias. Skin: Negative for rash. Neurological: Negative for dizziness. Psychiatric/Behavioral: Negative for behavioral problems. Prior to Visit Medications    Medication Sig Taking?  Authorizing Provider   hydrALAZINE (APRESOLINE) 100 MG tablet Take one tablet   Twice a day for blood pressure Yes Nicol Alexander MD   lisinopril (PRINIVIL;ZESTRIL) 40 MG tablet Take 1 tablet by mouth daily Yes Quay Spurling, MD   polyethylene glycol (GLYCOLAX) 17 g packet Take 17 g by mouth daily  Dequan Morse, APRN - CNP   sennosides-docusate sodium (SENOKOT-S) 8.6-50 MG tablet Take 2 tablets by mouth daily as needed for Constipation  Dequan Morse, APRN - CNP   methocarbamol (ROBAXIN) 750 MG tablet Take 1 tablet by mouth every 8 hours as needed (spasms)  Dequan Morse, APRN - CNP   amLODIPine (NORVASC) 10 MG tablet Take 1 tablet by mouth daily  Lenard Tubbs MD   allopurinol (ZYLOPRIM) 300 MG tablet Take 1 tablet by mouth daily For arthritis stop allopurinol 100 mg  Lenard Tubbs MD   atorvastatin (LIPITOR) 20 MG tablet Take 1 tablet by mouth daily To prevent heart disease  Patient not taking: Reported on 6/27/2022  Lenard Tubbs MD        Social History     Tobacco Use    Smoking status: Never Smoker    Smokeless tobacco: Never Used   Substance Use Topics    Alcohol use: No        Vitals:    07/13/22 1735 07/13/22 1743   BP: (!) 158/94 (!) 162/102   Pulse: 98    Resp: 18    Temp: 98.3 °F (36.8 °C)    TempSrc: Temporal    Weight: 204 lb (92.5 kg)      Estimated body mass index is 24.19 kg/m² as calculated from the following:    Height as of 6/30/22: 6' 5\" (1.956 m). Weight as of this encounter: 204 lb (92.5 kg). Physical Exam  Constitutional:       Appearance: He is well-developed. HENT:      Head: Normocephalic. Eyes:      Conjunctiva/sclera: Conjunctivae normal.      Pupils: Pupils are equal, round, and reactive to light. Neck:      Thyroid: No thyromegaly. Cardiovascular:      Rate and Rhythm: Normal rate and regular rhythm. Heart sounds: Normal heart sounds. No murmur heard. Pulmonary:      Effort: Pulmonary effort is normal.      Breath sounds: Normal breath sounds. Abdominal:      General: Bowel sounds are normal.      Palpations: Abdomen is soft. There is no mass. Genitourinary:     Penis: Normal.       Prostate: Normal.   Musculoskeletal:         General: Normal range of motion. Cervical back: Normal range of motion and neck supple. Skin:     General: Skin is warm. Findings: No rash. Neurological:      Mental Status: He is alert. Psychiatric:         Behavior: Behavior normal.         ASSESSMENT/PLAN:  1. Primary hypertension  Blood pressure is still poorly controlled.   Will increase hydralazine from 25 to 100 mg twice daily continue amlodipine 10 mg daily and lisinopril 40 mg daily  - hydrALAZINE (APRESOLINE) 100 MG tablet; Take one tablet   Twice a day for blood pressure  Dispense: 60 tablet; Refill: 1    2. Spinal stenosis, cervical region /3. S/P cervical spinal fusion  6/30/22.  Follow up with Dr Orlando Winston      Keep up appointment with his pcp    An electronic signature was used to authenticate this note.    --Alysha Anthony MD on 7/13/2022 at 5:52 PM

## 2022-07-25 ENCOUNTER — OFFICE VISIT (OUTPATIENT)
Dept: PRIMARY CARE CLINIC | Age: 62
End: 2022-07-25
Payer: MEDICARE

## 2022-07-25 VITALS
HEIGHT: 77 IN | SYSTOLIC BLOOD PRESSURE: 132 MMHG | WEIGHT: 202 LBS | DIASTOLIC BLOOD PRESSURE: 82 MMHG | TEMPERATURE: 97.4 F | HEART RATE: 86 BPM | OXYGEN SATURATION: 95 % | BODY MASS INDEX: 23.85 KG/M2

## 2022-07-25 DIAGNOSIS — E78.00 PURE HYPERCHOLESTEROLEMIA: ICD-10-CM

## 2022-07-25 DIAGNOSIS — M50.90 CERVICAL NECK PAIN WITH EVIDENCE OF DISC DISEASE: ICD-10-CM

## 2022-07-25 DIAGNOSIS — I10 PRIMARY HYPERTENSION: ICD-10-CM

## 2022-07-25 DIAGNOSIS — R73.03 PRE-DIABETES: ICD-10-CM

## 2022-07-25 DIAGNOSIS — R07.89 ATYPICAL CHEST PAIN: Primary | ICD-10-CM

## 2022-07-25 PROCEDURE — 99214 OFFICE O/P EST MOD 30 MIN: CPT | Performed by: FAMILY MEDICINE

## 2022-07-25 PROCEDURE — 93000 ELECTROCARDIOGRAM COMPLETE: CPT | Performed by: FAMILY MEDICINE

## 2022-07-25 RX ORDER — ASPIRIN 81 MG/1
81 TABLET ORAL DAILY
Qty: 90 TABLET | Refills: 1 | Status: SHIPPED | OUTPATIENT
Start: 2022-07-25

## 2022-07-25 RX ORDER — ATORVASTATIN CALCIUM 20 MG/1
20 TABLET, FILM COATED ORAL DAILY
Qty: 90 TABLET | Refills: 1 | Status: SHIPPED | OUTPATIENT
Start: 2022-07-25

## 2022-07-25 RX ORDER — TIZANIDINE 4 MG/1
4 TABLET ORAL EVERY 6 HOURS PRN
COMMUNITY
End: 2022-07-25 | Stop reason: SDUPTHER

## 2022-07-25 RX ORDER — TIZANIDINE 4 MG/1
4 TABLET ORAL EVERY 6 HOURS PRN
Qty: 120 TABLET | Refills: 1 | Status: SHIPPED | OUTPATIENT
Start: 2022-07-25

## 2022-07-25 RX ORDER — NITROGLYCERIN 0.4 MG/1
0.4 TABLET SUBLINGUAL EVERY 5 MIN PRN
Qty: 25 TABLET | Refills: 3 | Status: SHIPPED | OUTPATIENT
Start: 2022-07-25

## 2022-07-25 ASSESSMENT — PATIENT HEALTH QUESTIONNAIRE - PHQ9
1. LITTLE INTEREST OR PLEASURE IN DOING THINGS: 0
5. POOR APPETITE OR OVEREATING: 0
2. FEELING DOWN, DEPRESSED OR HOPELESS: 0
SUM OF ALL RESPONSES TO PHQ QUESTIONS 1-9: 0
SUM OF ALL RESPONSES TO PHQ9 QUESTIONS 1 & 2: 0
9. THOUGHTS THAT YOU WOULD BE BETTER OFF DEAD, OR OF HURTING YOURSELF: 0
4. FEELING TIRED OR HAVING LITTLE ENERGY: 0
6. FEELING BAD ABOUT YOURSELF - OR THAT YOU ARE A FAILURE OR HAVE LET YOURSELF OR YOUR FAMILY DOWN: 0
SUM OF ALL RESPONSES TO PHQ QUESTIONS 1-9: 1
3. TROUBLE FALLING OR STAYING ASLEEP: 1
SUM OF ALL RESPONSES TO PHQ QUESTIONS 1-9: 0
SUM OF ALL RESPONSES TO PHQ QUESTIONS 1-9: 1
1. LITTLE INTEREST OR PLEASURE IN DOING THINGS: 0
SUM OF ALL RESPONSES TO PHQ QUESTIONS 1-9: 1
2. FEELING DOWN, DEPRESSED OR HOPELESS: 0
10. IF YOU CHECKED OFF ANY PROBLEMS, HOW DIFFICULT HAVE THESE PROBLEMS MADE IT FOR YOU TO DO YOUR WORK, TAKE CARE OF THINGS AT HOME, OR GET ALONG WITH OTHER PEOPLE: 0
8. MOVING OR SPEAKING SO SLOWLY THAT OTHER PEOPLE COULD HAVE NOTICED. OR THE OPPOSITE, BEING SO FIGETY OR RESTLESS THAT YOU HAVE BEEN MOVING AROUND A LOT MORE THAN USUAL: 0
SUM OF ALL RESPONSES TO PHQ QUESTIONS 1-9: 1
SUM OF ALL RESPONSES TO PHQ QUESTIONS 1-9: 0
SUM OF ALL RESPONSES TO PHQ9 QUESTIONS 1 & 2: 0
7. TROUBLE CONCENTRATING ON THINGS, SUCH AS READING THE NEWSPAPER OR WATCHING TELEVISION: 0
SUM OF ALL RESPONSES TO PHQ QUESTIONS 1-9: 0

## 2022-07-25 NOTE — PROGRESS NOTES
CC fu and chest pain    HPI 64 y/o male PMH HTN HLD (out of statin for a months) CERVICAL STENOSIS (s/p cervical fusion per Dr Josiah Love) PREDIATES (AIC 5.8) who is having  Some substernal chest pain intermittent for about 2  Weeks duration. It does not radiate to back or neck. Has has had some pain both arms 2/2 to his spinal stenosis. Unclear if exercise aggravates or rest relieves the pain when discussing with patient. It is usually of short duration. It can occur at night. Non smoker. An uncle possibly had heart diease  But this unclear. Cervical neck pain  S/p fusion  Less pain  Wants refill of muscle relaxant    Hyperlipidemia  No muscle aches or muscle weakness or cramps since last visit. Hypertension  No headache, sob, leg edema, stroke, kidney disease       Lab Results   Component Value Date    WBC 9.2 07/01/2022    HGB 11.3 (L) 07/01/2022    HCT 34.2 (L) 07/01/2022    MCV 80.3 07/01/2022     07/01/2022     Lab Results   Component Value Date    CREATININE 1.0 06/21/2022    BUN 12 06/21/2022     06/21/2022    K 4.5 06/21/2022     06/21/2022    CO2 23 06/21/2022     Lab Results   Component Value Date    CHOL 172 11/12/2019    CHOL 115 09/16/2015    CHOL 122 11/04/2014     Lab Results   Component Value Date    TRIG 139 11/12/2019    TRIG 179 (H) 09/16/2015    TRIG 179 (H) 11/04/2014     Lab Results   Component Value Date    HDL 31 (L) 06/21/2022    HDL 40 09/27/2021    HDL 42 11/12/2019     Lab Results   Component Value Date    LDLCALC see below 06/21/2022    LDLCALC 138 (H) 09/27/2021    LDLCALC 102 (H) 11/12/2019     Lab Results   Component Value Date    LABVLDL see below 06/21/2022    LABVLDL 33 09/27/2021    LABVLDL 28 11/12/2019     No results found for: Rasheed Iyer was seen today for follow-up and chest pain.     Diagnoses and all orders for this visit:    Atypical chest pain  He does have risk factors for CAD  Age, family history  hyperlipidemia hypertension  Ekg normal  Get stress test  -     EKG 12 Lead - Clinic Performed; Future  -     EKG 12 Lead - Clinic Performed  -     Cardiac Stress Test Exercise - Treadmill; Future  -     aspirin EC 81 MG EC tablet; Take 1 tablet by mouth in the morning.  -     nitroGLYCERIN (NITROSTAT) 0.4 MG SL tablet; Place 1 tablet under the tongue every 5 minutes as needed for Chest pain up to max of 3 total doses. If no relief after 1 dose, call 911. Primary hypertension  Bp is at goal < 140/90 today with current treatment  Low salt diet  No change in   meds  Ck labs  -     CBC; Future  -     Comprehensive Metabolic Panel; Future  -     Urinalysis; Future  -     TSH; Future  -     Lipid Panel    Cervical neck pain with evidence of disc disease  S/p cervical fusion procedure  Some residual stiffness  Cont prn  -     tiZANidine (ZANAFLEX) 4 MG tablet; Take 1 tablet by mouth every 6 hours as needed (prn) Take 4 mg by mouth every 6 hours as needed    Pure hypercholesterolemia  Last lipids borderline low HDL and borderline  i  -     atorvastatin (LIPITOR) 20 MG tablet; Take 1 tablet by mouth in the morning. To prevent heart disease. Pre-diabetes  -     atorvastatin (LIPITOR) 20 MG tablet; Take 1 tablet by mouth in the morning. To prevent heart disease.

## 2022-07-26 DIAGNOSIS — I10 PRIMARY HYPERTENSION: ICD-10-CM

## 2022-07-26 LAB
A/G RATIO: 1.4 (ref 1.1–2.2)
ALBUMIN SERPL-MCNC: 4.4 G/DL (ref 3.4–5)
ALP BLD-CCNC: 94 U/L (ref 40–129)
ALT SERPL-CCNC: 13 U/L (ref 10–40)
ANION GAP SERPL CALCULATED.3IONS-SCNC: 12 MMOL/L (ref 3–16)
AST SERPL-CCNC: 13 U/L (ref 15–37)
BILIRUB SERPL-MCNC: <0.2 MG/DL (ref 0–1)
BILIRUBIN URINE: NEGATIVE
BLOOD, URINE: NEGATIVE
BUN BLDV-MCNC: 16 MG/DL (ref 7–20)
CALCIUM SERPL-MCNC: 9.1 MG/DL (ref 8.3–10.6)
CHLORIDE BLD-SCNC: 103 MMOL/L (ref 99–110)
CLARITY: CLEAR
CO2: 22 MMOL/L (ref 21–32)
COLOR: YELLOW
CREAT SERPL-MCNC: 0.8 MG/DL (ref 0.8–1.3)
GFR AFRICAN AMERICAN: >60
GFR NON-AFRICAN AMERICAN: >60
GLUCOSE BLD-MCNC: 145 MG/DL (ref 70–99)
GLUCOSE URINE: NEGATIVE MG/DL
HCT VFR BLD CALC: 39.1 % (ref 40.5–52.5)
HEMOGLOBIN: 12.9 G/DL (ref 13.5–17.5)
KETONES, URINE: NEGATIVE MG/DL
LEUKOCYTE ESTERASE, URINE: NEGATIVE
MCH RBC QN AUTO: 26.3 PG (ref 26–34)
MCHC RBC AUTO-ENTMCNC: 32.9 G/DL (ref 31–36)
MCV RBC AUTO: 79.7 FL (ref 80–100)
MICROSCOPIC EXAMINATION: NORMAL
NITRITE, URINE: NEGATIVE
PDW BLD-RTO: 15.7 % (ref 12.4–15.4)
PH UA: 6 (ref 5–8)
PLATELET # BLD: 278 K/UL (ref 135–450)
PMV BLD AUTO: 8.1 FL (ref 5–10.5)
POTASSIUM SERPL-SCNC: 4 MMOL/L (ref 3.5–5.1)
PROTEIN UA: NEGATIVE MG/DL
RBC # BLD: 4.9 M/UL (ref 4.2–5.9)
SODIUM BLD-SCNC: 137 MMOL/L (ref 136–145)
SPECIFIC GRAVITY UA: 1.02 (ref 1–1.03)
TOTAL PROTEIN: 7.6 G/DL (ref 6.4–8.2)
TSH SERPL DL<=0.05 MIU/L-ACNC: 0.71 UIU/ML (ref 0.27–4.2)
URINE TYPE: NORMAL
UROBILINOGEN, URINE: 0.2 E.U./DL
WBC # BLD: 6.9 K/UL (ref 4–11)

## 2022-07-29 DIAGNOSIS — R73.09 ELEVATED HEMOGLOBIN A1C: Primary | ICD-10-CM

## 2022-07-29 DIAGNOSIS — R73.9 BLOOD GLUCOSE ELEVATED: ICD-10-CM

## 2022-07-30 LAB
ESTIMATED AVERAGE GLUCOSE: 114 MG/DL
HBA1C MFR BLD: 5.6 %

## 2022-08-03 ENCOUNTER — HOSPITAL ENCOUNTER (OUTPATIENT)
Dept: NON INVASIVE DIAGNOSTICS | Age: 62
Discharge: HOME | End: 2022-08-03
Payer: MEDICARE

## 2022-08-03 DIAGNOSIS — R07.89 ATYPICAL CHEST PAIN: ICD-10-CM

## 2022-08-03 PROCEDURE — 93017 CV STRESS TEST TRACING ONLY: CPT

## 2022-08-05 ENCOUNTER — OFFICE VISIT (OUTPATIENT)
Dept: PRIMARY CARE CLINIC | Age: 62
End: 2022-08-05
Payer: MEDICARE

## 2022-08-05 VITALS
DIASTOLIC BLOOD PRESSURE: 71 MMHG | SYSTOLIC BLOOD PRESSURE: 121 MMHG | TEMPERATURE: 98.1 F | HEIGHT: 77 IN | HEART RATE: 99 BPM | BODY MASS INDEX: 24.32 KG/M2 | OXYGEN SATURATION: 98 % | WEIGHT: 206 LBS

## 2022-08-05 DIAGNOSIS — R07.89 ATYPICAL CHEST PAIN: Primary | ICD-10-CM

## 2022-08-05 DIAGNOSIS — E78.2 MIXED HYPERLIPIDEMIA: ICD-10-CM

## 2022-08-05 PROCEDURE — 99213 OFFICE O/P EST LOW 20 MIN: CPT | Performed by: FAMILY MEDICINE

## 2022-08-05 ASSESSMENT — PATIENT HEALTH QUESTIONNAIRE - PHQ9
3. TROUBLE FALLING OR STAYING ASLEEP: 1
SUM OF ALL RESPONSES TO PHQ QUESTIONS 1-9: 2
7. TROUBLE CONCENTRATING ON THINGS, SUCH AS READING THE NEWSPAPER OR WATCHING TELEVISION: 0
SUM OF ALL RESPONSES TO PHQ QUESTIONS 1-9: 0
2. FEELING DOWN, DEPRESSED OR HOPELESS: 0
1. LITTLE INTEREST OR PLEASURE IN DOING THINGS: 0
4. FEELING TIRED OR HAVING LITTLE ENERGY: 1
5. POOR APPETITE OR OVEREATING: 0
1. LITTLE INTEREST OR PLEASURE IN DOING THINGS: 0
8. MOVING OR SPEAKING SO SLOWLY THAT OTHER PEOPLE COULD HAVE NOTICED. OR THE OPPOSITE, BEING SO FIGETY OR RESTLESS THAT YOU HAVE BEEN MOVING AROUND A LOT MORE THAN USUAL: 0
2. FEELING DOWN, DEPRESSED OR HOPELESS: 0
SUM OF ALL RESPONSES TO PHQ QUESTIONS 1-9: 2
6. FEELING BAD ABOUT YOURSELF - OR THAT YOU ARE A FAILURE OR HAVE LET YOURSELF OR YOUR FAMILY DOWN: 0
SUM OF ALL RESPONSES TO PHQ QUESTIONS 1-9: 0
9. THOUGHTS THAT YOU WOULD BE BETTER OFF DEAD, OR OF HURTING YOURSELF: 0
SUM OF ALL RESPONSES TO PHQ QUESTIONS 1-9: 2
10. IF YOU CHECKED OFF ANY PROBLEMS, HOW DIFFICULT HAVE THESE PROBLEMS MADE IT FOR YOU TO DO YOUR WORK, TAKE CARE OF THINGS AT HOME, OR GET ALONG WITH OTHER PEOPLE: 0
SUM OF ALL RESPONSES TO PHQ9 QUESTIONS 1 & 2: 0
SUM OF ALL RESPONSES TO PHQ9 QUESTIONS 1 & 2: 0
SUM OF ALL RESPONSES TO PHQ QUESTIONS 1-9: 2
SUM OF ALL RESPONSES TO PHQ QUESTIONS 1-9: 0
SUM OF ALL RESPONSES TO PHQ QUESTIONS 1-9: 0

## 2022-08-05 NOTE — PATIENT INSTRUCTIONS
See the cardiologist for chest pain  Ok to take  tylenol , baby asa, statin, nitroglycerin as needed      Did not  meds ordered last visit  From the pharmacy yet

## 2022-08-05 NOTE — PROGRESS NOTES
CC for up for stress test    HPI 65 y.o. male PMH HTN HLD  Presented with atypical chest pain and  Risk factors for heart disease    He is fu visit today  He still has some mild  Left sided CP  Worse on some days and not  Others  Not related to exertion or stress  Denies chest wall soreness        Physical Exam  Constitutional:       General: He is not in acute distress. Appearance: He is well-developed. He is not diaphoretic. HENT:      Head: Normocephalic and atraumatic. Right Ear: External ear normal.      Left Ear: External ear normal.      Nose: Nose normal.      Mouth/Throat:      Pharynx: No oropharyngeal exudate. Eyes:      General: No scleral icterus. Right eye: No discharge. Left eye: No discharge. Conjunctiva/sclera: Conjunctivae normal.      Pupils: Pupils are equal, round, and reactive to light. Neck:      Thyroid: No thyromegaly. Vascular: No JVD. Trachea: No tracheal deviation. Cardiovascular:      Rate and Rhythm: Normal rate and regular rhythm. Pulses:           Carotid pulses are 2+ on the right side and 2+ on the left side. Radial pulses are 2+ on the right side and 2+ on the left side. Femoral pulses are 2+ on the right side and 2+ on the left side. Popliteal pulses are 2+ on the right side and 2+ on the left side. Dorsalis pedis pulses are 2+ on the right side and 2+ on the left side. Posterior tibial pulses are 2+ on the right side and 2+ on the left side. Heart sounds: Normal heart sounds. No murmur heard. No friction rub. Pulmonary:      Effort: Pulmonary effort is normal. No respiratory distress. Breath sounds: Normal breath sounds. No stridor. No wheezing or rales. Chest:      Chest wall: No tenderness. Abdominal:      General: Bowel sounds are normal. There is no distension. Palpations: Abdomen is soft. There is no mass. Tenderness: There is no abdominal tenderness.  There is no guarding or rebound. Musculoskeletal:         General: No tenderness. Normal range of motion. Cervical back: Normal range of motion and neck supple. Lymphadenopathy:      Cervical: No cervical adenopathy. Skin:     General: Skin is warm and dry. Coloration: Skin is not pale. Findings: No rash. Neurological:      Mental Status: He is oriented to person, place, and time. Cranial Nerves: No cranial nerve deficit. Motor: No abnormal muscle tone. Coordination: Coordination normal.      Deep Tendon Reflexes: Reflexes normal.   Psychiatric:         Behavior: Behavior normal.         Thought Content: Thought content normal.         Judgment: Judgment normal.       Lab Results   Component Value Date    CHOL 172 11/12/2019    CHOL 115 09/16/2015    CHOL 122 11/04/2014     Lab Results   Component Value Date    TRIG 139 11/12/2019    TRIG 179 (H) 09/16/2015    TRIG 179 (H) 11/04/2014     Lab Results   Component Value Date    HDL 31 (L) 06/21/2022    HDL 40 09/27/2021    HDL 42 11/12/2019     Lab Results   Component Value Date    LDLCALC see below 06/21/2022    LDLCALC 138 (H) 09/27/2021    LDLCALC 102 (H) 11/12/2019     Lab Results   Component Value Date    LABVLDL see below 06/21/2022    LABVLDL 33 09/27/2021    LABVLDL 28 11/12/2019     No results found for: [facility]    Lab Results   Component Value Date    CREATININE 0.8 07/26/2022    BUN 16 07/26/2022     07/26/2022    K 4.0 07/26/2022     07/26/2022    CO2 22 07/26/2022     Lab Results   Component Value Date    PSA 1.49 09/27/2021    PSA 0.69 11/12/2019               Veto was seen today for follow-up. Diagnoses and all orders for this visit:    Atypical chest pain  Stress test is equivocal  No chest wall tenderness  Imaging, has ref to see cardiologist   -     XR CHEST STANDARD (2 VW); Future    Mixed hyperlipidemia  No recent lipids  Restarted statin last  -     Lipid, Fasting;  Future

## 2022-08-08 ENCOUNTER — HOSPITAL ENCOUNTER (OUTPATIENT)
Age: 62
Discharge: HOME | End: 2022-08-08
Payer: MEDICARE

## 2022-08-08 ENCOUNTER — HOSPITAL ENCOUNTER (OUTPATIENT)
Dept: GENERAL RADIOLOGY | Age: 62
Discharge: HOME | End: 2022-08-08
Payer: MEDICARE

## 2022-08-08 DIAGNOSIS — E78.2 MIXED HYPERLIPIDEMIA: ICD-10-CM

## 2022-08-08 DIAGNOSIS — R07.89 ATYPICAL CHEST PAIN: ICD-10-CM

## 2022-08-08 LAB
CHOLESTEROL, FASTING: 126 MG/DL (ref 0–199)
HDLC SERPL-MCNC: 45 MG/DL (ref 40–60)
LDL CHOLESTEROL CALCULATED: 66 MG/DL
TRIGLYCERIDE, FASTING: 77 MG/DL (ref 0–150)
VLDLC SERPL CALC-MCNC: 15 MG/DL

## 2022-08-08 PROCEDURE — 71046 X-RAY EXAM CHEST 2 VIEWS: CPT

## 2022-08-31 ENCOUNTER — ANESTHESIA (OUTPATIENT)
Dept: OPERATING ROOM | Age: 62
DRG: 419 | End: 2022-08-31
Payer: MEDICARE

## 2022-08-31 ENCOUNTER — APPOINTMENT (OUTPATIENT)
Dept: GENERAL RADIOLOGY | Age: 62
DRG: 419 | End: 2022-08-31
Payer: MEDICARE

## 2022-08-31 ENCOUNTER — HOSPITAL ENCOUNTER (INPATIENT)
Age: 62
LOS: 1 days | Discharge: HOME | DRG: 419 | End: 2022-09-01
Attending: EMERGENCY MEDICINE | Admitting: SURGERY
Payer: MEDICARE

## 2022-08-31 ENCOUNTER — ANESTHESIA EVENT (OUTPATIENT)
Dept: OPERATING ROOM | Age: 62
DRG: 419 | End: 2022-08-31
Payer: MEDICARE

## 2022-08-31 ENCOUNTER — APPOINTMENT (OUTPATIENT)
Dept: CT IMAGING | Age: 62
DRG: 419 | End: 2022-08-31
Payer: MEDICARE

## 2022-08-31 DIAGNOSIS — K81.0 ACUTE CHOLECYSTITIS: Primary | ICD-10-CM

## 2022-08-31 DIAGNOSIS — K81.9 CHOLECYSTITIS: ICD-10-CM

## 2022-08-31 LAB
A/G RATIO: 1 (ref 1.1–2.2)
ABO/RH: NORMAL
ALBUMIN SERPL-MCNC: 4.1 G/DL (ref 3.4–5)
ALBUMIN SERPL-MCNC: 4.2 G/DL (ref 3.4–5)
ALP BLD-CCNC: 77 U/L (ref 40–129)
ALT SERPL-CCNC: 18 U/L (ref 10–40)
ANION GAP SERPL CALCULATED.3IONS-SCNC: 14 MMOL/L (ref 3–16)
ANION GAP SERPL CALCULATED.3IONS-SCNC: 14 MMOL/L (ref 3–16)
ANTIBODY SCREEN: NORMAL
AST SERPL-CCNC: 39 U/L (ref 15–37)
BASOPHILS ABSOLUTE: 0 K/UL (ref 0–0.2)
BASOPHILS RELATIVE PERCENT: 0.3 %
BILIRUB SERPL-MCNC: 0.7 MG/DL (ref 0–1)
BILIRUBIN URINE: NEGATIVE
BLOOD, URINE: NEGATIVE
BUN BLDV-MCNC: 10 MG/DL (ref 7–20)
BUN BLDV-MCNC: 9 MG/DL (ref 7–20)
CALCIUM SERPL-MCNC: 8.9 MG/DL (ref 8.3–10.6)
CALCIUM SERPL-MCNC: 9 MG/DL (ref 8.3–10.6)
CHLORIDE BLD-SCNC: 102 MMOL/L (ref 99–110)
CHLORIDE BLD-SCNC: 98 MMOL/L (ref 99–110)
CLARITY: CLEAR
CO2: 20 MMOL/L (ref 21–32)
CO2: 20 MMOL/L (ref 21–32)
COLOR: YELLOW
CREAT SERPL-MCNC: 0.9 MG/DL (ref 0.8–1.3)
CREAT SERPL-MCNC: 0.9 MG/DL (ref 0.8–1.3)
EKG ATRIAL RATE: 87 BPM
EKG DIAGNOSIS: NORMAL
EKG P AXIS: 77 DEGREES
EKG P-R INTERVAL: 148 MS
EKG Q-T INTERVAL: 358 MS
EKG QRS DURATION: 80 MS
EKG QTC CALCULATION (BAZETT): 430 MS
EKG R AXIS: 55 DEGREES
EKG T AXIS: 69 DEGREES
EKG VENTRICULAR RATE: 87 BPM
EOSINOPHILS ABSOLUTE: 0 K/UL (ref 0–0.6)
EOSINOPHILS RELATIVE PERCENT: 0 %
GFR AFRICAN AMERICAN: >60
GFR AFRICAN AMERICAN: >60
GFR NON-AFRICAN AMERICAN: >60
GFR NON-AFRICAN AMERICAN: >60
GLUCOSE BLD-MCNC: 116 MG/DL (ref 70–99)
GLUCOSE BLD-MCNC: 121 MG/DL (ref 70–99)
GLUCOSE URINE: NEGATIVE MG/DL
HCT VFR BLD CALC: 41.8 % (ref 40.5–52.5)
HEMOGLOBIN: 13.4 G/DL (ref 13.5–17.5)
INR BLD: 1.25 (ref 0.87–1.14)
KETONES, URINE: NEGATIVE MG/DL
LACTIC ACID: 3 MMOL/L (ref 0.4–2)
LEUKOCYTE ESTERASE, URINE: NEGATIVE
LIPASE: 41 U/L (ref 13–60)
LYMPHOCYTES ABSOLUTE: 0.8 K/UL (ref 1–5.1)
LYMPHOCYTES RELATIVE PERCENT: 7.3 %
MAGNESIUM: 1.9 MG/DL (ref 1.8–2.4)
MCH RBC QN AUTO: 25.7 PG (ref 26–34)
MCHC RBC AUTO-ENTMCNC: 32 G/DL (ref 31–36)
MCV RBC AUTO: 80.3 FL (ref 80–100)
MICROSCOPIC EXAMINATION: NORMAL
MONOCYTES ABSOLUTE: 1.2 K/UL (ref 0–1.3)
MONOCYTES RELATIVE PERCENT: 10.7 %
NEUTROPHILS ABSOLUTE: 8.9 K/UL (ref 1.7–7.7)
NEUTROPHILS RELATIVE PERCENT: 81.7 %
NITRITE, URINE: NEGATIVE
PDW BLD-RTO: 16.3 % (ref 12.4–15.4)
PH UA: 8 (ref 5–8)
PHOSPHORUS: 4.5 MG/DL (ref 2.5–4.9)
PLATELET # BLD: 218 K/UL (ref 135–450)
PMV BLD AUTO: 8.9 FL (ref 5–10.5)
POTASSIUM REFLEX MAGNESIUM: 6.3 MMOL/L (ref 3.5–5.1)
POTASSIUM SERPL-SCNC: 3.8 MMOL/L (ref 3.5–5.1)
POTASSIUM SERPL-SCNC: 4.6 MMOL/L (ref 3.5–5.1)
PROTEIN UA: NEGATIVE MG/DL
PROTHROMBIN TIME: 15.6 SEC (ref 11.7–14.5)
RBC # BLD: 5.21 M/UL (ref 4.2–5.9)
SODIUM BLD-SCNC: 132 MMOL/L (ref 136–145)
SODIUM BLD-SCNC: 136 MMOL/L (ref 136–145)
SPECIFIC GRAVITY UA: 1.01 (ref 1–1.03)
TOTAL PROTEIN: 8.3 G/DL (ref 6.4–8.2)
TROPONIN: <0.01 NG/ML
URINE REFLEX TO CULTURE: NORMAL
URINE TYPE: NORMAL
UROBILINOGEN, URINE: 1 E.U./DL
WBC # BLD: 10.9 K/UL (ref 4–11)

## 2022-08-31 PROCEDURE — 83735 ASSAY OF MAGNESIUM: CPT

## 2022-08-31 PROCEDURE — 6370000000 HC RX 637 (ALT 250 FOR IP): Performed by: STUDENT IN AN ORGANIZED HEALTH CARE EDUCATION/TRAINING PROGRAM

## 2022-08-31 PROCEDURE — 6360000002 HC RX W HCPCS: Performed by: STUDENT IN AN ORGANIZED HEALTH CARE EDUCATION/TRAINING PROGRAM

## 2022-08-31 PROCEDURE — 6360000002 HC RX W HCPCS: Performed by: NURSE ANESTHETIST, CERTIFIED REGISTERED

## 2022-08-31 PROCEDURE — 7100000000 HC PACU RECOVERY - FIRST 15 MIN: Performed by: SURGERY

## 2022-08-31 PROCEDURE — 80053 COMPREHEN METABOLIC PANEL: CPT

## 2022-08-31 PROCEDURE — 3600000004 HC SURGERY LEVEL 4 BASE: Performed by: SURGERY

## 2022-08-31 PROCEDURE — 36415 COLL VENOUS BLD VENIPUNCTURE: CPT

## 2022-08-31 PROCEDURE — 85610 PROTHROMBIN TIME: CPT

## 2022-08-31 PROCEDURE — 3600000014 HC SURGERY LEVEL 4 ADDTL 15MIN: Performed by: SURGERY

## 2022-08-31 PROCEDURE — 6360000002 HC RX W HCPCS: Performed by: EMERGENCY MEDICINE

## 2022-08-31 PROCEDURE — 71045 X-RAY EXAM CHEST 1 VIEW: CPT

## 2022-08-31 PROCEDURE — 3700000000 HC ANESTHESIA ATTENDED CARE: Performed by: SURGERY

## 2022-08-31 PROCEDURE — 96372 THER/PROPH/DIAG INJ SC/IM: CPT

## 2022-08-31 PROCEDURE — G0378 HOSPITAL OBSERVATION PER HR: HCPCS

## 2022-08-31 PROCEDURE — 74300 X-RAY BILE DUCTS/PANCREAS: CPT

## 2022-08-31 PROCEDURE — 0FT44ZZ RESECTION OF GALLBLADDER, PERCUTANEOUS ENDOSCOPIC APPROACH: ICD-10-PCS | Performed by: SURGERY

## 2022-08-31 PROCEDURE — 6360000004 HC RX CONTRAST MEDICATION: Performed by: SURGERY

## 2022-08-31 PROCEDURE — A4217 STERILE WATER/SALINE, 500 ML: HCPCS | Performed by: SURGERY

## 2022-08-31 PROCEDURE — 3700000001 HC ADD 15 MINUTES (ANESTHESIA): Performed by: SURGERY

## 2022-08-31 PROCEDURE — 85025 COMPLETE CBC W/AUTO DIFF WBC: CPT

## 2022-08-31 PROCEDURE — 96376 TX/PRO/DX INJ SAME DRUG ADON: CPT

## 2022-08-31 PROCEDURE — 84132 ASSAY OF SERUM POTASSIUM: CPT

## 2022-08-31 PROCEDURE — 83605 ASSAY OF LACTIC ACID: CPT

## 2022-08-31 PROCEDURE — 96365 THER/PROPH/DIAG IV INF INIT: CPT

## 2022-08-31 PROCEDURE — 47563 LAPARO CHOLECYSTECTOMY/GRAPH: CPT | Performed by: SURGERY

## 2022-08-31 PROCEDURE — 7100000001 HC PACU RECOVERY - ADDTL 15 MIN: Performed by: SURGERY

## 2022-08-31 PROCEDURE — 2580000003 HC RX 258

## 2022-08-31 PROCEDURE — 99285 EMERGENCY DEPT VISIT HI MDM: CPT

## 2022-08-31 PROCEDURE — 96361 HYDRATE IV INFUSION ADD-ON: CPT

## 2022-08-31 PROCEDURE — 83690 ASSAY OF LIPASE: CPT

## 2022-08-31 PROCEDURE — 88304 TISSUE EXAM BY PATHOLOGIST: CPT

## 2022-08-31 PROCEDURE — 2709999900 HC NON-CHARGEABLE SUPPLY: Performed by: SURGERY

## 2022-08-31 PROCEDURE — 74177 CT ABD & PELVIS W/CONTRAST: CPT

## 2022-08-31 PROCEDURE — 2500000003 HC RX 250 WO HCPCS: Performed by: NURSE ANESTHETIST, CERTIFIED REGISTERED

## 2022-08-31 PROCEDURE — 1200000000 HC SEMI PRIVATE

## 2022-08-31 PROCEDURE — 2580000003 HC RX 258: Performed by: EMERGENCY MEDICINE

## 2022-08-31 PROCEDURE — 81003 URINALYSIS AUTO W/O SCOPE: CPT

## 2022-08-31 PROCEDURE — 94150 VITAL CAPACITY TEST: CPT

## 2022-08-31 PROCEDURE — 6360000002 HC RX W HCPCS: Performed by: SURGERY

## 2022-08-31 PROCEDURE — 6360000002 HC RX W HCPCS

## 2022-08-31 PROCEDURE — 99223 1ST HOSP IP/OBS HIGH 75: CPT | Performed by: SURGERY

## 2022-08-31 PROCEDURE — 86850 RBC ANTIBODY SCREEN: CPT

## 2022-08-31 PROCEDURE — 93005 ELECTROCARDIOGRAM TRACING: CPT | Performed by: EMERGENCY MEDICINE

## 2022-08-31 PROCEDURE — 96375 TX/PRO/DX INJ NEW DRUG ADDON: CPT

## 2022-08-31 PROCEDURE — 6360000004 HC RX CONTRAST MEDICATION: Performed by: EMERGENCY MEDICINE

## 2022-08-31 PROCEDURE — 84484 ASSAY OF TROPONIN QUANT: CPT

## 2022-08-31 PROCEDURE — 2500000003 HC RX 250 WO HCPCS: Performed by: SURGERY

## 2022-08-31 PROCEDURE — 2580000003 HC RX 258: Performed by: STUDENT IN AN ORGANIZED HEALTH CARE EDUCATION/TRAINING PROGRAM

## 2022-08-31 PROCEDURE — 2580000003 HC RX 258: Performed by: SURGERY

## 2022-08-31 PROCEDURE — C1758 CATHETER, URETERAL: HCPCS | Performed by: SURGERY

## 2022-08-31 PROCEDURE — 86901 BLOOD TYPING SEROLOGIC RH(D): CPT

## 2022-08-31 PROCEDURE — 86900 BLOOD TYPING SEROLOGIC ABO: CPT

## 2022-08-31 RX ORDER — SODIUM CHLORIDE 0.9 % (FLUSH) 0.9 %
5-40 SYRINGE (ML) INJECTION EVERY 12 HOURS SCHEDULED
Status: DISCONTINUED | OUTPATIENT
Start: 2022-08-31 | End: 2022-08-31 | Stop reason: HOSPADM

## 2022-08-31 RX ORDER — MAGNESIUM HYDROXIDE 1200 MG/15ML
LIQUID ORAL CONTINUOUS PRN
Status: DISCONTINUED | OUTPATIENT
Start: 2022-08-31 | End: 2022-08-31 | Stop reason: HOSPADM

## 2022-08-31 RX ORDER — ENOXAPARIN SODIUM 100 MG/ML
40 INJECTION SUBCUTANEOUS DAILY
Status: DISCONTINUED | OUTPATIENT
Start: 2022-08-31 | End: 2022-09-01 | Stop reason: HOSPADM

## 2022-08-31 RX ORDER — TIZANIDINE 4 MG/1
4 TABLET ORAL EVERY 6 HOURS PRN
Status: DISCONTINUED | OUTPATIENT
Start: 2022-08-31 | End: 2022-09-01 | Stop reason: HOSPADM

## 2022-08-31 RX ORDER — PROPOFOL 10 MG/ML
INJECTION, EMULSION INTRAVENOUS PRN
Status: DISCONTINUED | OUTPATIENT
Start: 2022-08-31 | End: 2022-08-31 | Stop reason: SDUPTHER

## 2022-08-31 RX ORDER — HYDROMORPHONE HCL 110MG/55ML
PATIENT CONTROLLED ANALGESIA SYRINGE INTRAVENOUS PRN
Status: DISCONTINUED | OUTPATIENT
Start: 2022-08-31 | End: 2022-08-31 | Stop reason: SDUPTHER

## 2022-08-31 RX ORDER — HYDRALAZINE HYDROCHLORIDE 20 MG/ML
5 INJECTION INTRAMUSCULAR; INTRAVENOUS EVERY 6 HOURS PRN
Status: DISCONTINUED | OUTPATIENT
Start: 2022-08-31 | End: 2022-09-01 | Stop reason: HOSPADM

## 2022-08-31 RX ORDER — OXYCODONE HYDROCHLORIDE 5 MG/1
5 TABLET ORAL EVERY 4 HOURS PRN
Status: DISCONTINUED | OUTPATIENT
Start: 2022-08-31 | End: 2022-09-01 | Stop reason: HOSPADM

## 2022-08-31 RX ORDER — SODIUM CHLORIDE 9 MG/ML
INJECTION, SOLUTION INTRAVENOUS PRN
Status: DISCONTINUED | OUTPATIENT
Start: 2022-08-31 | End: 2022-09-01 | Stop reason: HOSPADM

## 2022-08-31 RX ORDER — ONDANSETRON 2 MG/ML
4 INJECTION INTRAMUSCULAR; INTRAVENOUS
Status: DISCONTINUED | OUTPATIENT
Start: 2022-08-31 | End: 2022-08-31 | Stop reason: HOSPADM

## 2022-08-31 RX ORDER — SODIUM CHLORIDE, SODIUM LACTATE, POTASSIUM CHLORIDE, CALCIUM CHLORIDE 600; 310; 30; 20 MG/100ML; MG/100ML; MG/100ML; MG/100ML
INJECTION, SOLUTION INTRAVENOUS CONTINUOUS
Status: DISCONTINUED | OUTPATIENT
Start: 2022-08-31 | End: 2022-09-01

## 2022-08-31 RX ORDER — SODIUM CHLORIDE 9 MG/ML
INJECTION, SOLUTION INTRAVENOUS PRN
Status: DISCONTINUED | OUTPATIENT
Start: 2022-08-31 | End: 2022-08-31 | Stop reason: HOSPADM

## 2022-08-31 RX ORDER — FENTANYL CITRATE 50 UG/ML
INJECTION, SOLUTION INTRAMUSCULAR; INTRAVENOUS PRN
Status: DISCONTINUED | OUTPATIENT
Start: 2022-08-31 | End: 2022-08-31 | Stop reason: SDUPTHER

## 2022-08-31 RX ORDER — ONDANSETRON 2 MG/ML
4 INJECTION INTRAMUSCULAR; INTRAVENOUS ONCE
Status: COMPLETED | OUTPATIENT
Start: 2022-08-31 | End: 2022-08-31

## 2022-08-31 RX ORDER — ROCURONIUM BROMIDE 10 MG/ML
INJECTION, SOLUTION INTRAVENOUS PRN
Status: DISCONTINUED | OUTPATIENT
Start: 2022-08-31 | End: 2022-08-31 | Stop reason: SDUPTHER

## 2022-08-31 RX ORDER — OXYCODONE HYDROCHLORIDE 5 MG/1
10 TABLET ORAL EVERY 4 HOURS PRN
Status: DISCONTINUED | OUTPATIENT
Start: 2022-08-31 | End: 2022-09-01 | Stop reason: HOSPADM

## 2022-08-31 RX ORDER — ATORVASTATIN CALCIUM 20 MG/1
20 TABLET, FILM COATED ORAL DAILY
Status: DISCONTINUED | OUTPATIENT
Start: 2022-08-31 | End: 2022-09-01 | Stop reason: HOSPADM

## 2022-08-31 RX ORDER — SODIUM CHLORIDE 0.9 % (FLUSH) 0.9 %
10 SYRINGE (ML) INJECTION EVERY 12 HOURS SCHEDULED
Status: DISCONTINUED | OUTPATIENT
Start: 2022-08-31 | End: 2022-09-01 | Stop reason: HOSPADM

## 2022-08-31 RX ORDER — HYDRALAZINE HYDROCHLORIDE 20 MG/ML
10 INJECTION INTRAMUSCULAR; INTRAVENOUS
Status: DISCONTINUED | OUTPATIENT
Start: 2022-08-31 | End: 2022-08-31 | Stop reason: HOSPADM

## 2022-08-31 RX ORDER — MIDAZOLAM HYDROCHLORIDE 1 MG/ML
INJECTION INTRAMUSCULAR; INTRAVENOUS PRN
Status: DISCONTINUED | OUTPATIENT
Start: 2022-08-31 | End: 2022-08-31 | Stop reason: SDUPTHER

## 2022-08-31 RX ORDER — PROCHLORPERAZINE EDISYLATE 5 MG/ML
5 INJECTION INTRAMUSCULAR; INTRAVENOUS
Status: DISCONTINUED | OUTPATIENT
Start: 2022-08-31 | End: 2022-08-31 | Stop reason: HOSPADM

## 2022-08-31 RX ORDER — LABETALOL HYDROCHLORIDE 5 MG/ML
10 INJECTION, SOLUTION INTRAVENOUS
Status: DISCONTINUED | OUTPATIENT
Start: 2022-08-31 | End: 2022-08-31 | Stop reason: HOSPADM

## 2022-08-31 RX ORDER — ACETAMINOPHEN 500 MG
1000 TABLET ORAL EVERY 6 HOURS
Status: DISCONTINUED | OUTPATIENT
Start: 2022-08-31 | End: 2022-09-01 | Stop reason: HOSPADM

## 2022-08-31 RX ORDER — SODIUM CHLORIDE 0.9 % (FLUSH) 0.9 %
5-40 SYRINGE (ML) INJECTION PRN
Status: DISCONTINUED | OUTPATIENT
Start: 2022-08-31 | End: 2022-08-31 | Stop reason: HOSPADM

## 2022-08-31 RX ORDER — ONDANSETRON 4 MG/1
4 TABLET, ORALLY DISINTEGRATING ORAL EVERY 8 HOURS PRN
Status: DISCONTINUED | OUTPATIENT
Start: 2022-08-31 | End: 2022-09-01 | Stop reason: HOSPADM

## 2022-08-31 RX ORDER — BUPIVACAINE HYDROCHLORIDE 5 MG/ML
INJECTION, SOLUTION EPIDURAL; INTRACAUDAL PRN
Status: DISCONTINUED | OUTPATIENT
Start: 2022-08-31 | End: 2022-08-31 | Stop reason: HOSPADM

## 2022-08-31 RX ORDER — DEXAMETHASONE SODIUM PHOSPHATE 4 MG/ML
INJECTION, SOLUTION INTRA-ARTICULAR; INTRALESIONAL; INTRAMUSCULAR; INTRAVENOUS; SOFT TISSUE PRN
Status: DISCONTINUED | OUTPATIENT
Start: 2022-08-31 | End: 2022-08-31 | Stop reason: SDUPTHER

## 2022-08-31 RX ORDER — KETAMINE HCL IN NACL, ISO-OSM 20 MG/2 ML
SYRINGE (ML) INJECTION PRN
Status: DISCONTINUED | OUTPATIENT
Start: 2022-08-31 | End: 2022-08-31 | Stop reason: SDUPTHER

## 2022-08-31 RX ORDER — ONDANSETRON 2 MG/ML
4 INJECTION INTRAMUSCULAR; INTRAVENOUS EVERY 6 HOURS PRN
Status: DISCONTINUED | OUTPATIENT
Start: 2022-08-31 | End: 2022-09-01 | Stop reason: HOSPADM

## 2022-08-31 RX ORDER — SODIUM CHLORIDE, SODIUM LACTATE, POTASSIUM CHLORIDE, AND CALCIUM CHLORIDE .6; .31; .03; .02 G/100ML; G/100ML; G/100ML; G/100ML
1000 INJECTION, SOLUTION INTRAVENOUS ONCE
Status: COMPLETED | OUTPATIENT
Start: 2022-08-31 | End: 2022-08-31

## 2022-08-31 RX ORDER — SODIUM CHLORIDE 0.9 % (FLUSH) 0.9 %
10 SYRINGE (ML) INJECTION PRN
Status: DISCONTINUED | OUTPATIENT
Start: 2022-08-31 | End: 2022-09-01 | Stop reason: HOSPADM

## 2022-08-31 RX ADMIN — SODIUM CHLORIDE, SODIUM LACTATE, POTASSIUM CHLORIDE, AND CALCIUM CHLORIDE: .6; .31; .03; .02 INJECTION, SOLUTION INTRAVENOUS at 17:31

## 2022-08-31 RX ADMIN — SODIUM CHLORIDE, POTASSIUM CHLORIDE, SODIUM LACTATE AND CALCIUM CHLORIDE 1000 ML: 600; 310; 30; 20 INJECTION, SOLUTION INTRAVENOUS at 09:22

## 2022-08-31 RX ADMIN — PROPOFOL 200 MG: 10 INJECTION, EMULSION INTRAVENOUS at 15:20

## 2022-08-31 RX ADMIN — ENOXAPARIN SODIUM 40 MG: 100 INJECTION SUBCUTANEOUS at 21:10

## 2022-08-31 RX ADMIN — ATORVASTATIN CALCIUM 20 MG: 20 TABLET, FILM COATED ORAL at 21:10

## 2022-08-31 RX ADMIN — IOPAMIDOL 75 ML: 755 INJECTION, SOLUTION INTRAVENOUS at 11:17

## 2022-08-31 RX ADMIN — CEFAZOLIN 2000 MG: 2 INJECTION, POWDER, FOR SOLUTION INTRAMUSCULAR; INTRAVENOUS at 15:26

## 2022-08-31 RX ADMIN — HYDROMORPHONE HYDROCHLORIDE 0.5 MG: 1 INJECTION, SOLUTION INTRAMUSCULAR; INTRAVENOUS; SUBCUTANEOUS at 09:21

## 2022-08-31 RX ADMIN — HYDROMORPHONE HYDROCHLORIDE 0.5 MG: 1 INJECTION, SOLUTION INTRAMUSCULAR; INTRAVENOUS; SUBCUTANEOUS at 10:30

## 2022-08-31 RX ADMIN — MIDAZOLAM HYDROCHLORIDE 2 MG: 2 INJECTION, SOLUTION INTRAMUSCULAR; INTRAVENOUS at 15:18

## 2022-08-31 RX ADMIN — HYDROMORPHONE HYDROCHLORIDE 1 MG: 1 INJECTION, SOLUTION INTRAMUSCULAR; INTRAVENOUS; SUBCUTANEOUS at 12:22

## 2022-08-31 RX ADMIN — ONDANSETRON 4 MG: 2 INJECTION INTRAMUSCULAR; INTRAVENOUS at 09:19

## 2022-08-31 RX ADMIN — ROCURONIUM BROMIDE 20 MG: 10 INJECTION INTRAVENOUS at 16:28

## 2022-08-31 RX ADMIN — PIPERACILLIN AND TAZOBACTAM 3375 MG: 3; .375 INJECTION, POWDER, FOR SOLUTION INTRAVENOUS at 21:16

## 2022-08-31 RX ADMIN — SODIUM CHLORIDE, SODIUM LACTATE, POTASSIUM CHLORIDE, AND CALCIUM CHLORIDE: .6; .31; .03; .02 INJECTION, SOLUTION INTRAVENOUS at 15:11

## 2022-08-31 RX ADMIN — FENTANYL CITRATE 50 MCG: 50 INJECTION, SOLUTION INTRAMUSCULAR; INTRAVENOUS at 15:11

## 2022-08-31 RX ADMIN — FENTANYL CITRATE 50 MCG: 50 INJECTION, SOLUTION INTRAMUSCULAR; INTRAVENOUS at 15:18

## 2022-08-31 RX ADMIN — ACETAMINOPHEN 1000 MG: 500 TABLET ORAL at 21:10

## 2022-08-31 RX ADMIN — PIPERACILLIN AND TAZOBACTAM 3375 MG: 3; .375 INJECTION, POWDER, FOR SOLUTION INTRAVENOUS at 12:25

## 2022-08-31 RX ADMIN — DEXAMETHASONE SODIUM PHOSPHATE 8 MG: 4 INJECTION, SOLUTION INTRAMUSCULAR; INTRAVENOUS at 15:31

## 2022-08-31 RX ADMIN — ROCURONIUM BROMIDE 50 MG: 10 INJECTION INTRAVENOUS at 15:20

## 2022-08-31 RX ADMIN — ROCURONIUM BROMIDE 20 MG: 10 INJECTION INTRAVENOUS at 16:07

## 2022-08-31 RX ADMIN — Medication 20 MG: at 15:18

## 2022-08-31 RX ADMIN — HYDROMORPHONE HYDROCHLORIDE 1 MG: 2 INJECTION, SOLUTION INTRAMUSCULAR; INTRAVENOUS; SUBCUTANEOUS at 16:35

## 2022-08-31 RX ADMIN — OXYCODONE 5 MG: 5 TABLET ORAL at 21:10

## 2022-08-31 ASSESSMENT — PAIN SCALES - GENERAL
PAINLEVEL_OUTOF10: 10
PAINLEVEL_OUTOF10: 0
PAINLEVEL_OUTOF10: 10
PAINLEVEL_OUTOF10: 0

## 2022-08-31 ASSESSMENT — LIFESTYLE VARIABLES: SMOKING_STATUS: 0

## 2022-08-31 ASSESSMENT — PAIN DESCRIPTION - LOCATION
LOCATION: ABDOMEN
LOCATION: ABDOMEN

## 2022-08-31 NOTE — ED PROVIDER NOTES
[address]          ATTENDING PHYSICIAN NOTE       Date of evaluation: 8/31/2022    Chief Complaint     Abdominal Pain (Vomiting two days ago with abd pain, pain suddenly worsened at 0230 in bilat upper quadrants )      History of Present Illness     Felisha Lucio is a 65 y.o. male who presents with complaint of abdominal pain. He says had off-and-on abdominal pain in the upper abdomen for the last several days, but at about 230 this morning became acutely worse and sort of buildup. Some associated with a couple episodes of vomiting today. Denies any diarrhea. Last bowel movement was today, was reportedly normal and did not change the pain. Denies any alleviating or aggravating factors otherwise. Denies dysuria or hematuria. No blood in vomit or stool. He does report he says some chest pain but says that it is an ongoing chronic issue, not acutely changed today. Review of Systems     Review of Systems  Pertinent positives and negatives are listed in the HPI; otherwise all systems are reviewed and were negative. Past Medical, Surgical, Family, and Social History     He has a past medical history of Arthritis, Arthropathy, Back pain, Depression, Gout, Hyperlipidemia, Hypertension, and Wears dentures. He has a past surgical history that includes Colonoscopy; Endoscopy, colon, diagnostic; lumbar fusion (2017); Arm Surgery (Left, 9/22/2020); Arm Surgery (Right, 10/13/2020); back surgery; Colonoscopy (N/A, 12/10/2021); Colonoscopy (N/A, 1/12/2022); and cervical fusion (N/A, 6/30/2022). His family history includes Cancer in his mother; Diabetes in his maternal uncle; Hypertension in his mother. He reports that he has never smoked. He has never used smokeless tobacco. He reports that he does not drink alcohol and does not use drugs.     Medications     Current Discharge Medication List        CONTINUE these medications which have NOT CHANGED    Details Cardiovascular:      Rate and Rhythm: Normal rate and regular rhythm. Pulmonary:      Effort: No respiratory distress. Breath sounds: No wheezing or rales. Abdominal:      General: There is no distension. Tenderness: There is abdominal tenderness in the right upper quadrant, right lower quadrant, epigastric area, periumbilical area and suprapubic area. There is guarding. Skin:     General: Skin is warm. Neurological:      General: No focal deficit present. Mental Status: He is oriented to person, place, and time. Diagnostic Results     EKG   Sinus rhythm, rate 87 bpm, AL interval 148, QRS 80 ms, QTc 430. No acute ischemic changes noted. RADIOLOGY:  CT ABDOMEN PELVIS W IV CONTRAST Additional Contrast? None   Final Result      Findings consistent with acute cholecystitis. Ductal dilatation is visualized but there is no obvious choledocholithiasis on CT scan.       Mild prostatomegaly      Moderate-sized hiatal hernia      XR CHEST PORTABLE   Final Result      No acute pulmonary pathology                      LABS:   Results for orders placed or performed during the hospital encounter of 08/31/22   CBC with Auto Differential   Result Value Ref Range    WBC 10.9 4.0 - 11.0 K/uL    RBC 5.21 4.20 - 5.90 M/uL    Hemoglobin 13.4 (L) 13.5 - 17.5 g/dL    Hematocrit 41.8 40.5 - 52.5 %    MCV 80.3 80.0 - 100.0 fL    MCH 25.7 (L) 26.0 - 34.0 pg    MCHC 32.0 31.0 - 36.0 g/dL    RDW 16.3 (H) 12.4 - 15.4 %    Platelets 767 520 - 388 K/uL    MPV 8.9 5.0 - 10.5 fL    Neutrophils % 81.7 %    Lymphocytes % 7.3 %    Monocytes % 10.7 %    Eosinophils % 0.0 %    Basophils % 0.3 %    Neutrophils Absolute 8.9 (H) 1.7 - 7.7 K/uL    Lymphocytes Absolute 0.8 (L) 1.0 - 5.1 K/uL    Monocytes Absolute 1.2 0.0 - 1.3 K/uL    Eosinophils Absolute 0.0 0.0 - 0.6 K/uL    Basophils Absolute 0.0 0.0 - 0.2 K/uL   CMP w/ Reflex to MG   Result Value Ref Range    Sodium 132 (L) 136 - 145 mmol/L    Potassium reflex Magnesium 6.3 (HH) 3.5 - 5.1 mmol/L    Chloride 98 (L) 99 - 110 mmol/L    CO2 20 (L) 21 - 32 mmol/L    Anion Gap 14 3 - 16    Glucose 116 (H) 70 - 99 mg/dL    BUN 9 7 - 20 mg/dL    Creatinine 0.9 0.8 - 1.3 mg/dL    GFR Non-African American >60 >60    GFR African American >60 >60    Calcium 9.0 8.3 - 10.6 mg/dL    Total Protein 8.3 (H) 6.4 - 8.2 g/dL    Albumin 4.2 3.4 - 5.0 g/dL    Albumin/Globulin Ratio 1.0 (L) 1.1 - 2.2    Total Bilirubin 0.7 0.0 - 1.0 mg/dL    Alkaline Phosphatase 77 40 - 129 U/L    ALT 18 10 - 40 U/L    AST 39 (H) 15 - 37 U/L   Lactic Acid   Result Value Ref Range    Lactic Acid 3.0 (H) 0.4 - 2.0 mmol/L   Lipase   Result Value Ref Range    Lipase 41.0 13.0 - 60.0 U/L   Troponin   Result Value Ref Range    Troponin <0.01 <0.01 ng/mL   Urinalysis with Reflex to Culture    Specimen: Urine   Result Value Ref Range    Color, UA Yellow Straw/Yellow    Clarity, UA Clear Clear    Glucose, Ur Negative Negative mg/dL    Bilirubin Urine Negative Negative    Ketones, Urine Negative Negative mg/dL    Specific Gravity, UA 1.010 1.005 - 1.030    Blood, Urine Negative Negative    pH, UA 8.0 5.0 - 8.0    Protein, UA Negative Negative mg/dL    Urobilinogen, Urine 1.0 <2.0 E.U./dL    Nitrite, Urine Negative Negative    Leukocyte Esterase, Urine Negative Negative    Microscopic Examination Not Indicated     Urine Type NotGiven     Urine Reflex to Culture Not Indicated    Potassium   Result Value Ref Range    Potassium 3.8 3.5 - 5.1 mmol/L   EKG 12 Lead   Result Value Ref Range    Ventricular Rate 87 BPM    Atrial Rate 87 BPM    P-R Interval 148 ms    QRS Duration 80 ms    Q-T Interval 358 ms    QTc Calculation (Bazett) 430 ms    P Axis 77 degrees    R Axis 55 degrees    T Axis 69 degrees    Diagnosis       EKG performed in ER and to be interpreted by ER physician. Confirmed by MD, ER ([id number]),  [address], [address] ([id number]) on 8/31/2022 9:46:52 AM       ED BEDSIDE ULTRASOUND:  No results found.     RECENT VITALS:  BP: (!) 167/97 (pt has not taken daily bp meds today),Temp: 98.6 °F (37 °C), Heart Rate: 91, Resp: 24, SpO2: 100 %     Procedures         ED Course     Nursing Notes, Past Medical Hx, Past Surgical Hx, Social Hx,Allergies, and Family Hx were reviewed. patient was given the following medications:  Orders Placed This Encounter   Medications    lactated ringers bolus    HYDROmorphone (DILAUDID) injection 0.5 mg    ondansetron (ZOFRAN) injection 4 mg    iopamidol (ISOVUE-370) 76 % injection 75 mL    piperacillin-tazobactam (ZOSYN) 3,375 mg in dextrose 5 % 50 mL IVPB (mini-bag)     Order Specific Question:   Antimicrobial Indications     Answer:   Intra-Abdominal Infection    HYDROmorphone (DILAUDID) injection 1 mg    ceFAZolin (ANCEF) 2,000 mg in sodium chloride 0.9 % 50 mL IVPB (mini-bag)     Order Specific Question:   Antimicrobial Indications     Answer:   Surgical Prophylaxis    lactated ringers infusion       CONSULTS:  IP CONSULT TO [address] [place] [address] DECISIONMAKING / ASSESSMENT / Yelitza Danyelle is a 65 y.o. male with abdominal pain. Given Zofran, Dilaudid, and IV fluid for symptom improvement, though remains in pain. Labs remarkable for leukocytosis with mildly elevated lactate. CT scan of the abdomen pelvis shows likely cholecystitis with multiple stones, pericholecystic fluid, wall thickening. Given these findings, we did start patient on empiric single agent Zosyn. Surgery was consulted, will take patient to the OR this afternoon. .        Clinical Impression     1. Cholecystitis        Disposition     PATIENT REFERRED TO:  No follow-up provider specified.     DISCHARGE MEDICATIONS:  Current Discharge Medication List          DISPOSITION Decision To Admit 08/31/2022 11:56:53 AM         Van Bernheim, MD  08/31/22 7104

## 2022-08-31 NOTE — FLOWSHEET NOTE
Patient's mother-n-law, Cherri Taylor ([phone]) updated in the [facility].

## 2022-08-31 NOTE — ANESTHESIA POSTPROCEDURE EVALUATION
Department of Anesthesiology  Postprocedure Note    Patient: Rolando Mena  MRN: [id number]  YOB: 1960  Date of evaluation: 8/31/2022      Procedure Summary     Date: 08/31/22 Room / Location: [address] [place] [address]    Anesthesia Start: 9674 Anesthesia Stop: 3487    Procedure: CHOLECYSTECTOMY LAPAROSCOPIC WITH INTRAOPERATIVE CHOLANGIOGRAM Diagnosis:       Cholecystitis      (CHOLECYSTITIS)    Surgeons: Bossman Sigala MD Responsible Provider: Dm Hyatt DO    Anesthesia Type: general ASA Status: 2          Anesthesia Type: No value filed.     Terra Phase I: Terra Score: 8    Terra Phase II:        Anesthesia Post Evaluation    Patient location during evaluation: PACU  Patient participation: complete - patient participated  Level of consciousness: awake and alert  Pain score: 0  Airway patency: patent  Nausea & Vomiting: no nausea and no vomiting  Cardiovascular status: blood pressure returned to baseline  Respiratory status: acceptable  Hydration status: euvolemic

## 2022-08-31 NOTE — ANESTHESIA PRE PROCEDURE
Department of Anesthesiology  Preprocedure Note       Name:  Elvia Magallanes   Age:  65 y.o.  :  1960                                          MRN:  [id number]         Date:  2022      Surgeon: Suzanne Hines):  Alonso Gregory MD    Procedure: Procedure(s):  CHOLECYSTECTOMY LAPAROSCOPIC WITH INTRAOPERATIVE CHOLANGIOGRAM    Medications prior to admission:   Prior to Admission medications    Medication Sig Start Date End Date Taking? Authorizing Provider   tiZANidine (ZANAFLEX) 4 MG tablet Take 1 tablet by mouth every 6 hours as needed (prn) Take 4 mg by mouth every 6 hours as needed 22   Angelina Hampton MD   atorvastatin (LIPITOR) 20 MG tablet Take 1 tablet by mouth in the morning. To prevent heart disease. 22   Angelina Hampton MD   aspirin EC 81 MG EC tablet Take 1 tablet by mouth in the morning. 22   Angelina Hampton MD   nitroGLYCERIN (NITROSTAT) 0.4 MG SL tablet Place 1 tablet under the tongue every 5 minutes as needed for Chest pain up to max of 3 total doses. If no relief after 1 dose, call 911. 22   Angelina Hampton MD   hydrALAZINE (APRESOLINE) 100 MG tablet Take one tablet   Twice a day for blood pressure 22   Hank Elise MD   sennosides-docusate sodium (SENOKOT-S) 8.6-50 MG tablet Take 2 tablets by mouth daily as needed for Constipation 22   BALA Rodriguez - CNP   amLODIPine (NORVASC) 10 MG tablet Take 1 tablet by mouth daily 22   Angelina Hampton MD   lisinopril (PRINIVIL;ZESTRIL) 40 MG tablet Take 1 tablet by mouth daily 22   Angelina Hampton MD   allopurinol (ZYLOPRIM) 300 MG tablet Take 1 tablet by mouth daily For arthritis stop allopurinol 100 mg 22   Angelina Hampton MD       Current medications:    No current facility-administered medications for this visit. No current outpatient medications on file.      Facility-Administered Medications Ordered in Other Visits   Medication Dose Route Frequency Provider Last Rate Last Admin    lactated ringers infusion   IntraVENous Continuous Xi Perry, DO   New Bag at 08/31/22 1511    dexamethasone (DECADRON) injection   IntraVENous PRN Mary Estrada APRN - CRNA   8 mg at 08/31/22 1531    ioversol (OPTIRAY) 68 % injection    PRN Issa Cole MD   50 mL at 08/31/22 1539    lactated ringers 1,000 mL with gentamicin (GARAMYCIN) 80 mg    PRN Issa Cole MD   1,000 mL at 08/31/22 1539    propofol injection   IntraVENous PRN Mary Evertcckelly, APRN - CRNA   200 mg at 08/31/22 1520    bupivacaine (PF) (MARCAINE) 0.5 % injection    PRN Issa Cole MD   7 mL at 08/31/22 1542    fentaNYL (SUBLIMAZE) injection   IntraVENous PRN Mary Evertccoli, APRN - CRNA   50 mcg at 08/31/22 1518    midazolam (VERSED) injection   IntraVENous PRN Mary Broccoli, APRN - CRNA   2 mg at 08/31/22 1518    ketamine (KETALAR) injection   IntraVENous PRN Mary Broccoli, APRN - CRNA   20 mg at 08/31/22 1518    rocuronium (Julane Cheeks) injection   IntraVENous PRN Mary Evertccoli, APRN - CRNA   50 mg at 08/31/22 1520       Allergies:  No Known Allergies    Problem List:    Patient Active Problem List   Diagnosis Code    TISHA positive R76.8    DDD (degenerative disc disease), cervical M50.30    DDD (degenerative disc disease), lumbar M51.36    Depression F32. A    Helicobacter pylori infection A04.8    Hand arthritis M19.049    Hypertension I10    Hypertriglyceridemia E78.1    Left arm weakness R29.898    Left low back pain M54.50    Neck pain on left side M54.2    Right wrist pain M25.531    SS-A antibody positive R76.8    SS-B antibody positive R76.8    Postlaminectomy syndrome, lumbar M96.1    Chronic pain syndrome G89.4    Synovitis of wrist M65.9    Cervical disc herniation M50.20    Gouty arthritis M10.9    Pre-diabetes R73.03    Pure hypercholesterolemia E78.00    Spinal stenosis, cervical region M48.02    Cervical stenosis of spinal canal M48.02    S/P cervical spinal fusion Z98.1    Cholecystitis Neuro/Psych:   (+) depression/anxiety              ROS comment: S/p ACDF still in c collar GI/Hepatic/Renal:            ROS comment: +cholecystitis. Endo/Other:                     Abdominal:             Vascular: Other Findings:             Anesthesia Plan      general     ASA 2       Induction: intravenous. MIPS: Prophylactic antiemetics administered. Anesthetic plan and risks discussed with patient. Plan discussed with CRNA.                     Mary Matthews, DO   8/31/2022

## 2022-08-31 NOTE — BRIEF OP NOTE
Brief Postoperative Note      Patient: Dariusz Dennis  YOB: 1960  MRN: [id number]    Date of Procedure: 8/31/2022    Pre-Op Diagnosis: CHOLECYSTITIS    Post-Op Diagnosis:  ACUTE GANGRENOUS CHOLECYSTITIS, CHOLELITHIASIS        Procedure(s):  CHOLECYSTECTOMY LAPAROSCOPIC WITH INTRAOPERATIVE CHOLANGIOGRAM    Surgeon(s):  Mara Issa MD    Assistant:  Resident: Donnie Ojeda DO    Anesthesia: General    Estimated Blood Loss (mL): 20    Complications: None    Specimens:   ID Type Source Tests Collected by Time Destination   A : [facility], MD 8/31/2022 1539        Implants:  * No implants in log *      Drains: * No LDAs found *    Findings: Acute, gangrenous gallbladder, severely distended with ischemic changes to fundus. Large stone impacted at neck, multiple stones retrieved. Intraoperative Cholangiogram from cystic duct with prompt filling of duo, no filling defects, normal backflow into intrahepatic system with dilation of common bile duct and hepatic ducts.     Electronically signed by Donnie Ojeda DO on 8/31/2022 at 5:50 PM

## 2022-08-31 NOTE — PROGRESS NOTES
Patient received from the OR to pACU #13 post [address] CHOLANGIOGRAM of Dr. Janet Donato. Placed on PACU monitoring equipment. Report given per CRNA and Dr. Gifty Acosta. Per report, patient was stable intra op. Is 2 months post cervical fusion with Dr. Miguel Angel Oliveros. Cervical collar remained in place intra op and is intact now. On arrival, patient is arouseable, but falls back to sleep with no complaints of pain.

## 2022-09-01 VITALS
HEART RATE: 77 BPM | DIASTOLIC BLOOD PRESSURE: 87 MMHG | OXYGEN SATURATION: 98 % | WEIGHT: 216 LBS | HEIGHT: 77 IN | TEMPERATURE: 97.4 F | BODY MASS INDEX: 25.5 KG/M2 | RESPIRATION RATE: 18 BRPM | SYSTOLIC BLOOD PRESSURE: 128 MMHG

## 2022-09-01 LAB
ALBUMIN SERPL-MCNC: 3.9 G/DL (ref 3.4–5)
ALP BLD-CCNC: 75 U/L (ref 40–129)
ALT SERPL-CCNC: 33 U/L (ref 10–40)
ANION GAP SERPL CALCULATED.3IONS-SCNC: 11 MMOL/L (ref 3–16)
AST SERPL-CCNC: 37 U/L (ref 15–37)
BASOPHILS ABSOLUTE: 0.1 K/UL (ref 0–0.2)
BASOPHILS RELATIVE PERCENT: 0.5 %
BILIRUB SERPL-MCNC: 0.7 MG/DL (ref 0–1)
BILIRUBIN DIRECT: <0.2 MG/DL (ref 0–0.3)
BILIRUBIN, INDIRECT: NORMAL MG/DL (ref 0–1)
BUN BLDV-MCNC: 13 MG/DL (ref 7–20)
CALCIUM SERPL-MCNC: 8.6 MG/DL (ref 8.3–10.6)
CHLORIDE BLD-SCNC: 103 MMOL/L (ref 99–110)
CO2: 24 MMOL/L (ref 21–32)
CREAT SERPL-MCNC: 1 MG/DL (ref 0.8–1.3)
EOSINOPHILS ABSOLUTE: 0 K/UL (ref 0–0.6)
EOSINOPHILS RELATIVE PERCENT: 0 %
GFR AFRICAN AMERICAN: >60
GFR NON-AFRICAN AMERICAN: >60
GLUCOSE BLD-MCNC: 109 MG/DL (ref 70–99)
HCT VFR BLD CALC: 37.6 % (ref 40.5–52.5)
HEMOGLOBIN: 12 G/DL (ref 13.5–17.5)
LYMPHOCYTES ABSOLUTE: 0.9 K/UL (ref 1–5.1)
LYMPHOCYTES RELATIVE PERCENT: 5.6 %
MAGNESIUM: 1.9 MG/DL (ref 1.8–2.4)
MCH RBC QN AUTO: 25.4 PG (ref 26–34)
MCHC RBC AUTO-ENTMCNC: 32 G/DL (ref 31–36)
MCV RBC AUTO: 79.5 FL (ref 80–100)
MONOCYTES ABSOLUTE: 1.4 K/UL (ref 0–1.3)
MONOCYTES RELATIVE PERCENT: 8.6 %
NEUTROPHILS ABSOLUTE: 13.9 K/UL (ref 1.7–7.7)
NEUTROPHILS RELATIVE PERCENT: 85.3 %
PDW BLD-RTO: 16.3 % (ref 12.4–15.4)
PHOSPHORUS: 3.6 MG/DL (ref 2.5–4.9)
PLATELET # BLD: 185 K/UL (ref 135–450)
PMV BLD AUTO: 8.8 FL (ref 5–10.5)
POTASSIUM SERPL-SCNC: 4.4 MMOL/L (ref 3.5–5.1)
RBC # BLD: 4.73 M/UL (ref 4.2–5.9)
SODIUM BLD-SCNC: 138 MMOL/L (ref 136–145)
TOTAL PROTEIN: 6.4 G/DL (ref 6.4–8.2)
WBC # BLD: 16.3 K/UL (ref 4–11)

## 2022-09-01 PROCEDURE — G0378 HOSPITAL OBSERVATION PER HR: HCPCS

## 2022-09-01 PROCEDURE — 36415 COLL VENOUS BLD VENIPUNCTURE: CPT

## 2022-09-01 PROCEDURE — 2580000003 HC RX 258: Performed by: STUDENT IN AN ORGANIZED HEALTH CARE EDUCATION/TRAINING PROGRAM

## 2022-09-01 PROCEDURE — 6370000000 HC RX 637 (ALT 250 FOR IP): Performed by: STUDENT IN AN ORGANIZED HEALTH CARE EDUCATION/TRAINING PROGRAM

## 2022-09-01 PROCEDURE — 96372 THER/PROPH/DIAG INJ SC/IM: CPT

## 2022-09-01 PROCEDURE — 80076 HEPATIC FUNCTION PANEL: CPT

## 2022-09-01 PROCEDURE — 6360000002 HC RX W HCPCS: Performed by: STUDENT IN AN ORGANIZED HEALTH CARE EDUCATION/TRAINING PROGRAM

## 2022-09-01 PROCEDURE — 85025 COMPLETE CBC W/AUTO DIFF WBC: CPT

## 2022-09-01 PROCEDURE — 80069 RENAL FUNCTION PANEL: CPT

## 2022-09-01 PROCEDURE — 96365 THER/PROPH/DIAG IV INF INIT: CPT

## 2022-09-01 PROCEDURE — 94150 VITAL CAPACITY TEST: CPT

## 2022-09-01 PROCEDURE — 99024 POSTOP FOLLOW-UP VISIT: CPT | Performed by: SURGERY

## 2022-09-01 PROCEDURE — 94664 DEMO&/EVAL PT USE INHALER: CPT

## 2022-09-01 PROCEDURE — 83735 ASSAY OF MAGNESIUM: CPT

## 2022-09-01 PROCEDURE — 96366 THER/PROPH/DIAG IV INF ADDON: CPT

## 2022-09-01 RX ORDER — OXYCODONE HYDROCHLORIDE 5 MG/1
5 TABLET ORAL EVERY 6 HOURS PRN
Qty: 28 TABLET | Refills: 0 | Status: SHIPPED | OUTPATIENT
Start: 2022-09-01 | End: 2022-09-08

## 2022-09-01 RX ORDER — AMOXICILLIN AND CLAVULANATE POTASSIUM 875; 125 MG/1; MG/1
1 TABLET, FILM COATED ORAL 2 TIMES DAILY
Qty: 8 TABLET | Refills: 0 | Status: SHIPPED | OUTPATIENT
Start: 2022-09-01 | End: 2022-09-05

## 2022-09-01 RX ORDER — MAGNESIUM SULFATE IN WATER 40 MG/ML
2000 INJECTION, SOLUTION INTRAVENOUS ONCE
Status: COMPLETED | OUTPATIENT
Start: 2022-09-01 | End: 2022-09-01

## 2022-09-01 RX ADMIN — OXYCODONE 10 MG: 5 TABLET ORAL at 09:01

## 2022-09-01 RX ADMIN — ENOXAPARIN SODIUM 40 MG: 100 INJECTION SUBCUTANEOUS at 09:01

## 2022-09-01 RX ADMIN — PIPERACILLIN AND TAZOBACTAM 3375 MG: 3; .375 INJECTION, POWDER, FOR SOLUTION INTRAVENOUS at 05:16

## 2022-09-01 RX ADMIN — ATORVASTATIN CALCIUM 20 MG: 20 TABLET, FILM COATED ORAL at 09:01

## 2022-09-01 RX ADMIN — MAGNESIUM SULFATE HEPTAHYDRATE 2000 MG: 40 INJECTION, SOLUTION INTRAVENOUS at 09:05

## 2022-09-01 RX ADMIN — ACETAMINOPHEN 1000 MG: 500 TABLET ORAL at 05:17

## 2022-09-01 RX ADMIN — ACETAMINOPHEN 1000 MG: 500 TABLET ORAL at 09:01

## 2022-09-01 ASSESSMENT — PAIN SCALES - GENERAL
PAINLEVEL_OUTOF10: 4
PAINLEVEL_OUTOF10: 8
PAINLEVEL_OUTOF10: 8

## 2022-09-01 ASSESSMENT — PAIN DESCRIPTION - LOCATION: LOCATION: ABDOMEN

## 2022-09-01 NOTE — PROGRESS NOTES
4 Eyes Admission Assessment     I agree as the admission nurse that 2 RN's have performed a thorough Head to Toe Skin Assessment on the patient. ALL assessment sites listed below have been assessed on admission. Areas assessed by both nurses:  [x]   Head, Face, and Ears   [x]   Shoulders, Back, and Chest  [x]   Arms, Elbows, and Hands   [x]   Coccyx, Sacrum, and Ischium  [x]   Legs, Feet, and Heels        Does the Patient have Skin Breakdown?   No         Lukas Prevention initiated:  NA   Wound Care Orders initiated:  NA      WOC nurse consulted for Pressure Injury (Stage 3,4, Unstageable, DTI, NWPT, and Complex wounds) or Lukas score 18 or lower:  No      Nurse 1 eSignature: Electronically signed by Chica Cohen RN on 9/1/22 at 4:24 AM EDT    **SHARE this note so that the co-signing nurse is able to place an eSignature**    Nurse 2 eSignature: Electronically signed by Ray Pretty RN on 9/1/22 at 4:25 AM EDT

## 2022-09-01 NOTE — PROGRESS NOTES
Patient a/o x4. VSS overnight. Lap sites are CDI. Pain controlled well with tylenol and PRN oxycodone. Tolerating sips of clears with no nausea. Pt able to void without difficulty. Fall precautions in place. Will continue to monitor.

## 2022-09-01 NOTE — PROGRESS NOTES
PACU Transfer Note    Vitals:    08/31/22 1945   BP: (!) 140/86   Pulse: 85   Resp: 14   Temp: 98.1 °F (36.7 °C)   SpO2: 95%   BP is at baseline value. In: 251 [I.V.:251]  Out: 0     Pain assessment:  none, sleeping when not disturbed  Pain Level: 0    Report given to Receiving unit RNFrancis by phone. Transferred to ready room in bed per pacu transporter. Per patient, he sent all belongings home with his mother-n-law.     8/31/2022 8:01 PM

## 2022-09-01 NOTE — PROGRESS NOTES
Department of Surgery:  Post-op Note    CC: acute cholecystitis     Subjective:   Patient resting comfortably. Pain is controlled, denies nausea or vomiting. Voiding. Passing gas, denies BM. Tolerating CLD. No complaints at this time, reports feels much improved.      Objective:  Anesthesia type: General      I/O    Intra op    Post op     Fluids  1000 mL 750 mL     EBL 20 mL 0 mL     Urine 0 mL 700 mL     Physical Exam:  Vitals:    08/31/22 1930 08/31/22 1945 08/31/22 2009 08/31/22 2330   BP: 139/89 (!) 140/86 (!) 145/98 129/84   Pulse: 83 85 86 78   Resp: 15 14 18 18   Temp:  98.1 °F (36.7 °C) 98.2 °F (36.8 °C) 98.9 °F (37.2 °C)   TempSrc:  Temporal Oral Oral   SpO2: 95% 95% 96% 97%   Weight:       Height:           General appearance: alert, no acute distress, grooming appropriate  Chest/Lungs: no adventitious breath sounds, normal effort on RA  Cardiovascular: RRR  Abdomen: soft, appropriately tender, non-distended, incisions c/d/I  : grossly normal  Extremities: no edema, no cyanosis  Neuro: A&Ox3, no focal deficits, sensation intact    Assessment and Plan  This is a 65y.o. year old male with a diagnosis of acute cholecystitis s/p cholecystectomy POD #0    Pain management: Scheduled: tylenol 1000 PRN: dilaudid, roxicodone  Cardiovascular: lipitor, hydralazine prn,   Respiratory: extubated, encourage hourly incentive spirometry and deep breathing  FEN:  Fluids: , Diet: NPO sips chips  : Urine output is adequate  Wound: local care dermabond   Ambulation: OOB to chair, encourage ambulation  Prophylaxis: flynn Davidson DO  PGY1, General Surgery  08/31/22  11:55 PM  [employer]  Pager: [phone]

## 2022-09-01 NOTE — CARE COORDINATION
Case Management Assessment            Discharge Note                    Date / Time of Note: 9/1/2022 11:53 AM                  Discharge Note Completed by: Juan A Ragland RN    Patient Name: Mariam Ruiz   YOB: 1960  Diagnosis: Cholecystitis [K81.9]  Acute cholecystitis [K81.0]   Date / Time: 8/31/2022  8:35 AM    Current PCP: Minnie Agustin MD  Clinic patient: No    Hospitalization in the last 30 days: No    Advance Directives:  Code Status: Full Code  [facility] DNR form completed and on chart: Not Indicated    Financial:  Payor: Hyun Cutler / Plan: Ryan Loera / Product Type: *No Product type* /      Pharmacy:    [address] [phone] Donnice Frankel [phone]  [address] [place] [id number]  Phone: [phone] Fax: [address], [address] [facility] [phone] Donnice Frankel [phone]  [address] [address]  Phone: [phone] Fax: [phone]      Assistance purchasing medications?:    Assistance provided by Case Management: None at this time    Does patient want to participate in local refill/ meds to beds program?:      Meds To Beds General Rules:  1. Can ONLY be done Monday- Friday between 8:30am-5pm  2. Prescription(s) must be in pharmacy by 3pm to be filled same day  3. Copy of patient's insurance/ prescription drug card and patient face sheet must be sent along with the prescription(s)  4. Cost of Rx cannot be added to hospital bill. If financial assistance is needed, please contact unit  or ;  or  CANNOT provide pharmacy voucher for patients co-pays  5.  Patients can then  the prescription on their way out of the hospital at discharge, or pharmacy can deliver to the bedside if staff is available. (payment due at time of pick-up or delivery - cash, check, or card accepted)     Able to afford home medications/ co-pay costs: Yes    ADLS:  Current PT AM-PAC Score:   /24  Current OT AM-PAC Score:   /24      DISCHARGE Disposition: Home- No Services Needed    LOC at discharge: Not Applicable  JARRELL Completed: Not Indicated    Notification completed in HENS/PAS?:  Not Applicable    IMM Completed:   Not Indicated    Transportation:  Transportation PLAN for discharge: family   Mode of Transport: [address] ordered at discharge: Not [address]: Not Applicable  Orders faxed: No    Durable Medical Equipment:  DME Provider: none  Equipment obtained during hospitalization:     Home Oxygen and Respiratory Equipment:  Oxygen needed at discharge?: Not [address]: Not Applicable  Portable tank available for discharge?: Not Indicated    Dialysis:  Dialysis patient: No    Dialysis Center:  Not Applicable      Additional CM Notes: Pt will DC home with family support no needs at DC from [facility] will follow up OP with surgical team     The Plan for Transition of Care is related to the following treatment goals of Cholecystitis [K81.9]  Acute cholecystitis [K81.0]    The Patient and/or patient representative Oscar Griffith and his family were provided with a choice of provider and agrees with the discharge plan Yes    Freedom of choice list was provided with basic dialogue that supports the patient's individualized plan of care/goals and shares the quality data associated with the providers.  Not Indicated    Care Transitions patient: No    Vignesh Donald RN  [facility]  Case Management Department  Ph: [phone]  Fax: [phone]

## 2022-09-01 NOTE — PROGRESS NOTES
General Surgery   Daily Progress Note  Patient: Elvi Herzog      CC: acute gangrenous cholecystitis    SUBJECTIVE:   Pt feeling better this AM. Having some soreness this AM, but pain resolved. No N/V. Passing gas. Tolerating fluids this morning. ROS:   A 14 point review of systems was conducted, significant findings as noted above. All other systems negative. OBJECTIVE:    PHYSICAL EXAM:    Vitals:    08/31/22 1945 08/31/22 2009 08/31/22 2330 09/01/22 0329   BP: (!) 140/86 (!) 145/98 129/84 123/65   Pulse: 85 86 78 76   Resp: 14 18 18 18   Temp: 98.1 °F (36.7 °C) 98.2 °F (36.8 °C) 98.9 °F (37.2 °C) 98.6 °F (37 °C)   TempSrc: Temporal Oral Oral Oral   SpO2: 95% 96% 97% 97%   Weight:       Height:           General appearance: alert, no acute distress, grooming appropriate  Chest/Lungs: no adventitious breath sounds, normal effort on RA  Cardiovascular: RRR  Abdomen: soft, appropriately tender, non-distended, incisions c/d/I  : grossly normal  Extremities: no edema, no cyanosis  Neuro: A&Ox3, no focal deficits, sensation intact    LABS:   Recent Labs     08/31/22 0931 09/01/22 0442   WBC 10.9 16.3*   HGB 13.4* 12.0*   HCT 41.8 37.6*   MCV 80.3 79.5*    185        Recent Labs     08/31/22 2023 09/01/22 0442    138   K 4.6 4.4    103   CO2 20* 24   PHOS 4.5 3.6   BUN 10 13   CREATININE 0.9 1.0        Recent Labs     08/31/22 0931 09/01/22 0442   AST 39* 37   ALT 18 33   BILIDIR  --  <0.2   BILITOT 0.7 0.7   ALKPHOS 77 75        Recent Labs     08/31/22 0931   LIPASE 41.0        Recent Labs     08/31/22 0931 08/31/22 2023 09/01/22 0442   PROT 8.3*  --  6.4   INR  --  1.25*  --         Recent Labs     08/31/22 0931   TROPONINI <0.01         ASSESSMENT & PLAN:   This is a 65y.o. year old male with a diagnosis of acute gangrenous cholecystitis s/p cholecystectomy POD #1    - advance to reg diet this morning   - SLIV if good UOP  - OOB ambulate   - Continue ABX.  Plan for four days from source control   - IS   - Continue strict I/O  - Possible dispo today vs tomorrow     Chloé Baeza MD  PGY1, General Surgery  09/01/22  6:33 AM  TLAKB#[phone]    I have seen, examined, and reviewed the patients chart. I agree with the residents assessment and have made appropriate changes.     Madison Asa

## 2022-09-01 NOTE — DISCHARGE INSTRUCTIONS
Diet:   - Can resume regular diet    Wound Care:   - Skin incisions are covered with skin glue, this will wear off in 1-2 weeks. You may shower, no tub bath or soaking of incision. Activity:   - No heavy lifting greater than a milk jug until follow up. Pain management:   - No driving while on narcotics, otherwise, you can drive when you can sit comfortably behind the steering wheel and can slam on the brake without it hurting or turn the wheel sharply without it hurting. Practice this when sitting the car with it parked in your driveway before trying to drive. For moderate to severe pain:  - Please take Roxicodone pain medication every 6 hours as needed. For mild to moderate pain:  - Please take over the counter tylenol or motrin for any post-operative pain you might have. Please take this as needed and as recommended on the label. Return if:   Call/ Return to ED for increased redness, worsening pain, drainage from wound, fevers, or any other concerns about your incision or post op course. Ok to resume your aspirin in 48 hours after discharge. Please follow up with Dr. Chandni Jimenez in 7-10 days. Please call [phone] to make your appointment.

## 2022-09-01 NOTE — PLAN OF CARE
Problem: Discharge Planning  Goal: Discharge to home or other facility with appropriate resources  9/1/2022 1316 by Ekaterina Cervantes RN  Outcome: Completed  9/1/2022 1316 by Ekaterina Cervantes RN  Outcome: Progressing     Problem: Pain  Goal: Verbalizes/displays adequate comfort level or baseline comfort level  9/1/2022 1316 by Ekaterina Cervantes RN  Outcome: Completed  9/1/2022 1316 by Ekaterina Cervantes RN  Outcome: Progressing     Problem: ABCDS Injury Assessment  Goal: Absence of physical injury  9/1/2022 1316 by Ekaterina Cervantes RN  Outcome: Completed  9/1/2022 1316 by Ekaterina Cervantes RN  Outcome: Progressing

## 2022-09-04 NOTE — DISCHARGE SUMMARY
stable condition    Discharge Physical Exam:     General appearance: alert, no acute distress, grooming appropriate  Chest/Lungs: no adventitious breath sounds, normal effort on RA  Cardiovascular: RRR  Abdomen: soft, appropriately tender, non-distended, incisions c/d/I  : grossly normal  Extremities: no edema, no cyanosis  Neuro: A&Ox3, no focal deficits, sensation intact    Consults:   N/A    Significant Diagnostic Studies:   N/A    Treatments/Procedures: surgery: CHOLECYSTECTOMY LAPAROSCOPIC WITH INTRAOPERATIVE CHOLANGIOGRAM    Disposition: home    Patient Instructions:   See discharge instruction form.     Signed:  Jabari Lomas MD  9/4/2022  1:33 PM  [phone]

## 2022-09-05 NOTE — OP NOTE
Operative Note      Patient: Tracy Paulson  YOB: 1960  MRN: [id number]    Date of Procedure: 8/31/2022    Pre-Op Diagnosis: CHOLECYSTITIS    Post-Op Diagnosis: ACUTE GANGRENOUS CHOLECYSTITIS       Procedure(s):  CHOLECYSTECTOMY LAPAROSCOPIC WITH INTRAOPERATIVE CHOLANGIOGRAM    Surgeon(s):  Darlene Breaux MD    Assistant:   Resident: Monroe Jessica DO    Anesthesia: General    Estimated Blood Loss (mL): 20    Complications: None    Specimens:   ID Type Source Tests Collected by Time Destination   A : [facility] 8/31/2022 1539        Implants: None      Drains: None    Findings:  Acute, gangrenous gallbladder, severely distended with ischemic changes to fundus. Large stone impacted at neck, multiple stones retrieved. Intraoperative Cholangiogram from cystic duct with prompt filling of duo, no filling defects, normal backflow into intrahepatic system with dilation of common bile duct and hepatic ducts. Indication:  The patient is a 61-year old male who presented to the ED with severe right-upper quadrant abdominal pain that awakened him from sleep 12 hours prior and continued to progress. Workup, including CT scan, was consistent with acute cholecystitis. After discussion of indications, risks, benefits, and alternatives to surgical intervention, he agreed to proceed. Detailed Description of Procedure: The patient was brought to the operating room and placed in the supine position on the operating table. After adequate induction of general anesthesia and securement of the endotracheal tube, the patient was secured to the operating table. The abdomen was prepped and draped in a sterile fashion. A timeout was performed according to hospital policy    A site was selected above the umbilicus for entrance into the abdominal cavity. Skin was anesthetized with 0.5% Marcaine.  Incision was then made using a 11-blade scalpel, carried down through the subcutaneous tissue to expose the anterior fascia. Following this, the fascia was grasped and raised into the wound using two towel clips. An incision in the fascia was made using an 11-blade scalpel. Blunt dissection was used to gain entrance into the abdominal cavity. Following entrance into the abdominal cavity, the 10-mm Tabitha trocar was then introduced. The abdomen was insufflated. Following adequate insufflation, three additional 5-mm trocars were placed in the epigastrium, midclavicular line, and right anterior axillary line. Following placement of these trocars, the gallbladder was visualized and noted to be severely distended with an ischemic fundus. The fundus of the gallbladder was grasped, retracted cephalad. A needle was introduced via a 5mm trocar and inserted into a dependent portion of the fundus in attempt to decompress the gallbladder. Only a small amount of bile was able to be aspirated due to its viscous nature. The hole made with the needle was widened and a suction  was introduced and the contents of the gallbladder were suctioned free to allow for better ability to grasp and retract the gallbladder. The resultant defect was closed with an endoloop to prevent further spillage. The omental attachments to the gallbladder were taken down using electrocautery to allow for full exposure of the gallbladder. The cystic duct and artery were both identified, circumferentially dissected free and clearly showing to be entering the gallbladder. Once adequate dissection was completed, a clip was applied to the proximal cystic duct. The cystic duct was opened and the cholangiogram catheter was positioned in the cystic duct, and a cholangiogram was performed, which revealed no evidence of choledocholithiasis, prompt filling of the duodenum and appropriate filling of the intrahepatic biliary tree. There was noted dilatation of the CBD and intrahepatic tree.   Following this, the catheter was removed. Two clips were applied to the distal cystic duct. The cystic duct was divided. The artery was similarly circumferentially dissected, clipped doubly proximally and once distally and divided with scissors. Following this, the gallbladder was then removed from the gallbladder bed using electrocautery and placed in a retrieval bag. Electrocautery was used to obtain hemostasis. The right upper quadrant was copiously irrigated, and care was taken to suction all small stones. Large stones were removed with graspers. Time was taken to ensure that all visible stones were removed. The abdomen was again irrigated and aspirated back to clear fluid. The gallbladder bed was again inspected. There was good hemostasis noted throughout. Clips were all occlusive and intact. The abdomen was then de-insufflated with clear visualization of the trocars being removed during this process. We removed the gallbladder from the abdomen along with the 10-mm trocar. This required opening of the retrieval bag and removal of stones as well as lengthening of the umbilical incision. The fascia of the 10-mm trocar site was closed using interrupted #0 Vicryl suture. The wounds were all copiously irrigated, and the skin at the trocar sites were closed using 4-0 Monocryl. The wounds were cleaned, dried, and dressed with skin glue. The patient tolerated the procedure well, was woken up and brought to the PACU in stable condition. All counts were correct x2 at the end of the procedure. No complications. Dr. Hiren Watkins was present and scrubbed for the entirety of the procedure and directed its course from beginning to end.     Electronically signed by Monroe Jessica DO on 9/5/2022 at 1:31 PM

## 2022-09-08 ENCOUNTER — OFFICE VISIT (OUTPATIENT)
Dept: CARDIOLOGY CLINIC | Age: 62
End: 2022-09-08
Payer: MEDICARE

## 2022-09-08 VITALS
OXYGEN SATURATION: 98 % | SYSTOLIC BLOOD PRESSURE: 92 MMHG | DIASTOLIC BLOOD PRESSURE: 60 MMHG | BODY MASS INDEX: 23.76 KG/M2 | WEIGHT: 200.4 LBS | HEART RATE: 82 BPM

## 2022-09-08 DIAGNOSIS — R07.2 PRECORDIAL PAIN: ICD-10-CM

## 2022-09-08 DIAGNOSIS — I10 PRIMARY HYPERTENSION: ICD-10-CM

## 2022-09-08 DIAGNOSIS — E78.00 PURE HYPERCHOLESTEROLEMIA: ICD-10-CM

## 2022-09-08 DIAGNOSIS — R07.9 CHEST PAIN, UNSPECIFIED TYPE: Primary | ICD-10-CM

## 2022-09-08 PROCEDURE — 99204 OFFICE O/P NEW MOD 45 MIN: CPT | Performed by: INTERNAL MEDICINE

## 2022-09-08 ASSESSMENT — ENCOUNTER SYMPTOMS
COUGH: 0
COLOR CHANGE: 0
CHEST TIGHTNESS: 1
BLOOD IN STOOL: 0
BACK PAIN: 0
ABDOMINAL DISTENTION: 0
VOMITING: 0
WHEEZING: 0
ABDOMINAL PAIN: 0
EYE DISCHARGE: 0
SHORTNESS OF BREATH: 0
FACIAL SWELLING: 0

## 2022-09-08 NOTE — PROGRESS NOTES
[facility]     Outpatient Cardiology         Patient Name:  Cassie Mcbride  Requesting Physician: No admitting provider for patient encounter. Primary Care Physician: Grace Bocanegra MD    Reason for Consultation/Chief Complaint:   Chief Complaint   Patient presents with    Chest Pain     atypical    Results     S/p stress test         History of Present Illness:    HPI     Simon Oviedo a 65 y.o. male with PMH of HLD, HTN. Here for atypical chest pain. Referred by PCP. S/p cholecystectomy on 8/31/22. CP, described as precordial, sharp/dull, radiates to the left shoulder, moderate nature, not associated with her symptoms, no particular trigger relieving factors, usually acute onset and can last several hours. Had a plain GXT that was nondiagnostic. HTN, controlled on current medications, no side effects  HLD, on Lipitor. No side effects      PMH  Past Medical History:   Diagnosis Date    Arthritis     neck, back    Arthropathy     TISHA positive    Back pain     Depression     Gout     Hyperlipidemia     Hypertension     Wears dentures        PSH  Past Surgical History:   Procedure Laterality Date    ARM SURGERY Left 9/22/2020    LEFT ULNAR NERVE DECOMPRESSION (AT ELBOW) AND LEFT CARPAL TUNNEL RELEASE performed by Fidelina Azar MD at [place] [id number] Right 10/13/2020    RIGHT ULNAR NERVE DECOMPRESSION AT ELBOW AND RIGHT CARPAL TUNNEL RELEASE performed by Fidelina Azar MD at [address] [place] [address].      2015    CERVICAL FUSION N/A 6/30/2022    C4-T1 ANTERIOR CERVICAL DISCECTOMY AND FUSION performed by Dakota Back.  Michaela Knox MD at [address], LAPAROSCOPIC N/A 8/31/2022    CHOLECYSTECTOMY LAPAROSCOPIC WITH INTRAOPERATIVE CHOLANGIOGRAM performed by Delicia Lawrence MD at [address] [place] [address]      COLONOSCOPY N/A 12/10/2021    COLONOSCOPY POLYPECTOMY SNARE/COLD BIOPSY performed by Everlyn Severe, MD at [facility] N/A 1/12/2022    COLONOSCOPY DIAGNOSTIC performed by Adrianna Rich MD at [address] [place] [address], COLON, DIAGNOSTIC      LUMBAR FUSION  2017    PLIF L3-S1 done in [place], [place]        Social HIstory  Social History     Tobacco Use    Smoking status: Never    Smokeless tobacco: Never   Vaping Use    Vaping Use: Never used   Substance Use Topics    Alcohol use: No    Drug use: No       Family History  Family History   Problem Relation Age of Onset    Cancer Mother     Hypertension Mother     Diabetes Maternal Uncle        Allergies   No Known Allergies    Medications:     Home Medications:  Were reviewed and are listed in nursing record. and/or listed below    Prior to Admission medications    Medication Sig Start Date End Date Taking? Authorizing Provider   oxyCODONE (ROXICODONE) 5 MG immediate release tablet Take 1 tablet by mouth every 6 hours as needed for Pain for up to 7 days. Intended supply: 7 days. Take lowest dose possible to manage pain 9/1/22 9/8/22 Yes Bautista Fuentes MD   tiZANidine (ZANAFLEX) 4 MG tablet Take 1 tablet by mouth every 6 hours as needed (prn) Take 4 mg by mouth every 6 hours as needed 7/25/22  Yes Ramana Pinon MD   aspirin EC 81 MG EC tablet Take 1 tablet by mouth in the morning. 7/25/22  Yes Ramana Pinon MD   nitroGLYCERIN (NITROSTAT) 0.4 MG SL tablet Place 1 tablet under the tongue every 5 minutes as needed for Chest pain up to max of 3 total doses.  If no relief after 1 dose, call 911. 7/25/22  Yes Ramana Pinon MD   hydrALAZINE (APRESOLINE) 100 MG tablet Take one tablet   Twice a day for blood pressure 7/13/22  Yes Jazmin Wang MD   sennosides-docusate sodium (SENOKOT-S) 8.6-50 MG tablet Take 2 tablets by mouth daily as needed for Constipation 7/1/22  Yes BALA Hanna - CNP   amLODIPine (NORVASC) 10 MG tablet Take 1 tablet by mouth daily 1/24/22  Yes Ramana Pinon MD   lisinopril (PRINIVIL;ZESTRIL) 40 MG tablet Take 1 tablet by mouth daily 1/24/22  Yes Ramana Pinon MD   allopurinol (ZYLOPRIM) 300 MG tablet Take 1 tablet by mouth daily For arthritis stop allopurinol 100 mg 1/24/22  Yes Erica Diaz MD   atorvastatin (LIPITOR) 20 MG tablet Take 1 tablet by mouth in the morning. To prevent heart disease. Patient not taking: Reported on 9/8/2022 7/25/22   [place]rica Diaz MD        Review of Systems   Constitutional:  Negative for activity change, appetite change, diaphoresis, fatigue, fever and unexpected weight change. HENT:  Negative for congestion, facial swelling, mouth sores and nosebleeds. Eyes:  Negative for discharge and visual disturbance. Respiratory:  Positive for chest tightness. Negative for cough, shortness of breath and wheezing. Cardiovascular:  Positive for chest pain. Negative for palpitations and leg swelling. Gastrointestinal:  Negative for abdominal distention, abdominal pain, blood in stool and vomiting. Endocrine: Negative for cold intolerance, heat intolerance and polyuria. Genitourinary:  Negative for difficulty urinating, dysuria, frequency and hematuria. Musculoskeletal:  Negative for back pain, joint swelling, myalgias and neck pain. Skin:  Negative for color change, pallor and rash. Allergic/Immunologic: Negative for immunocompromised state. Neurological:  Negative for dizziness, syncope, weakness, light-headedness, numbness and headaches. Hematological:  Negative for adenopathy. Does not bruise/bleed easily. Psychiatric/Behavioral:  Negative for behavioral problems, confusion, decreased concentration and suicidal ideas. The patient is not nervous/anxious.       Vitals:    09/08/22 1528   BP: 92/60   Pulse: 82   SpO2: 98%    Weight: 200 lb 6.4 oz (90.9 kg)       Vitals:    09/08/22 1528   BP: 92/60   Site: Left Upper Arm   Position: Sitting   Cuff Size: Medium Adult   Pulse: 82   SpO2: 98%   Weight: 200 lb 6.4 oz (90.9 kg)       BP Readings from Last 3 Encounters:   09/08/22 92/60   09/01/22 128/87   08/05/22 121/71       Wt Readings from Last 3 Encounters:   09/08/22 200 lb 6.4 oz (90.9 kg)   08/31/22 216 lb (98 kg)   08/05/22 206 lb (93.4 kg)       Physical Exam  Constitutional:       General: He is not in acute distress. Appearance: He is well-developed. He is not diaphoretic. HENT:      Head: Normocephalic and atraumatic. Eyes:      Pupils: Pupils are equal, round, and reactive to light. Neck:      Thyroid: No thyromegaly. Vascular: No JVD. Cardiovascular:      Rate and Rhythm: Normal rate and regular rhythm. Chest Wall: PMI is not displaced. Heart sounds: Normal heart sounds, S1 normal and S2 normal. No murmur heard. No friction rub. No gallop. Pulmonary:      Effort: Pulmonary effort is normal. No respiratory distress. Breath sounds: Normal breath sounds. No stridor. No wheezing or rales. Chest:      Chest wall: No tenderness. Abdominal:      General: Bowel sounds are normal. There is no distension. Palpations: Abdomen is soft. Tenderness: There is no abdominal tenderness. There is no guarding or rebound. Musculoskeletal:         General: No tenderness. Normal range of motion. Cervical back: Normal range of motion. Lymphadenopathy:      Cervical: No cervical adenopathy. Skin:     General: Skin is warm and dry. Findings: No erythema or rash. Neurological:      Mental Status: He is alert and oriented to person, place, and time. Coordination: Coordination normal.   Psychiatric:         Behavior: Behavior normal.         Thought Content:  Thought content normal.         Judgment: Judgment normal.       Labs:       Lab Results   Component Value Date    WBC 16.3 (H) 09/01/2022    HGB 12.0 (L) 09/01/2022    HCT 37.6 (L) 09/01/2022    MCV 79.5 (L) 09/01/2022     09/01/2022     Lab Results   Component Value Date     09/01/2022    K 4.4 09/01/2022     09/01/2022    CO2 24 09/01/2022    BUN 13 09/01/2022    CREATININE 1.0 09/01/2022    GLUCOSE 109 (H) 09/01/2022 CALCIUM 8.6 09/01/2022    PROT 6.4 09/01/2022    LABALBU 3.9 09/01/2022    BILITOT 0.7 09/01/2022    ALKPHOS 75 09/01/2022    AST 37 09/01/2022    ALT 33 09/01/2022    LABGLOM >60 09/01/2022    GFRAA >60 09/01/2022    AGRATIO 1.0 (L) 08/31/2022    GLOB 3.5 08/14/2020         Lab Results   Component Value Date    CHOL 172 11/12/2019    CHOL 115 09/16/2015    CHOL 122 11/04/2014     Lab Results   Component Value Date    TRIG 139 11/12/2019    TRIG 179 (H) 09/16/2015    TRIG 179 (H) 11/04/2014     Lab Results   Component Value Date    HDL 45 08/08/2022    HDL 31 (L) 06/21/2022    HDL 40 09/27/2021     Lab Results   Component Value Date    LDLCALC 66 08/08/2022    LDLCALC see below 06/21/2022    LDLCALC 138 (H) 09/27/2021     Lab Results   Component Value Date    LABVLDL 15 08/08/2022    LABVLDL see below 06/21/2022    LABVLDL 33 09/27/2021     No results found for: [facility]    Lab Results   Component Value Date    INR 1.25 (H) 08/31/2022    INR 1.00 06/21/2022    INR 1.01 09/16/2015    PROTIME 15.6 (H) 08/31/2022    PROTIME 13.0 06/21/2022    PROTIME 10.9 09/16/2015       The ASCVD Risk score (Jing Guzman, et al., 2013) failed to calculate for the following reasons: The valid total cholesterol range is 130 to 320 mg/dL      Imaging:       Last ECG (if available, Personally interpreted):  Normal sinus rhythm with nonspecific ST-T changes  Last Monitor/Holter (if available):    Last Stress (if available): 8/3/22  Conclusions   Summary   Using the Evelio protocol, the patient was stressed for 03:00 minutes 00:33   seconds. The target heart rate was 143 and the patient achieved a heart rate   of 146 (Stage 2). Exercise was terminated because of achievement of target   heart rate. Blood pressure:hypertensive. Impression:   Equivocal for ischemia due to baseline ST & T wave changes.     Last Cath (if available):    Last TTE/CRISTIANO(if available): 9/18/15   Summary   Mitral valve is structurally normal.   Trace to mild

## 2022-09-08 NOTE — ASSESSMENT & PLAN NOTE
Nondiagnostic plain a stress test.  Given risk factors plan for Myoview, okay to do [place] if needed

## 2022-09-16 ENCOUNTER — OFFICE VISIT (OUTPATIENT)
Dept: SURGERY | Age: 62
End: 2022-09-16

## 2022-09-16 VITALS
DIASTOLIC BLOOD PRESSURE: 98 MMHG | HEIGHT: 77 IN | SYSTOLIC BLOOD PRESSURE: 170 MMHG | WEIGHT: 201.8 LBS | BODY MASS INDEX: 23.83 KG/M2 | HEART RATE: 85 BPM

## 2022-09-16 DIAGNOSIS — Z90.49 S/P LAPAROSCOPIC CHOLECYSTECTOMY: Primary | ICD-10-CM

## 2022-09-16 PROCEDURE — 99024 POSTOP FOLLOW-UP VISIT: CPT | Performed by: SURGERY

## 2022-09-20 ENCOUNTER — HOSPITAL ENCOUNTER (OUTPATIENT)
Dept: NON INVASIVE DIAGNOSTICS | Age: 62
Discharge: HOME | End: 2022-09-20
Payer: MEDICARE

## 2022-09-20 DIAGNOSIS — R07.9 CHEST PAIN, UNSPECIFIED TYPE: ICD-10-CM

## 2022-09-20 LAB
LV EF: 68 %
LVEF MODALITY: NORMAL

## 2022-09-20 PROCEDURE — 78452 HT MUSCLE IMAGE SPECT MULT: CPT

## 2022-09-20 PROCEDURE — 3430000000 HC RX DIAGNOSTIC RADIOPHARMACEUTICAL: Performed by: INTERNAL MEDICINE

## 2022-09-20 PROCEDURE — 93017 CV STRESS TEST TRACING ONLY: CPT

## 2022-09-20 PROCEDURE — A9502 TC99M TETROFOSMIN: HCPCS | Performed by: INTERNAL MEDICINE

## 2022-09-20 PROCEDURE — 6360000002 HC RX W HCPCS: Performed by: INTERNAL MEDICINE

## 2022-09-20 RX ADMIN — TETROFOSMIN 10 MILLICURIE: 1.38 INJECTION, POWDER, LYOPHILIZED, FOR SOLUTION INTRAVENOUS at 13:34

## 2022-09-20 RX ADMIN — REGADENOSON 0.4 MG: 0.08 INJECTION, SOLUTION INTRAVENOUS at 14:44

## 2022-09-20 RX ADMIN — TETROFOSMIN 30 MILLICURIE: 1.38 INJECTION, POWDER, LYOPHILIZED, FOR SOLUTION INTRAVENOUS at 14:51

## 2022-10-05 NOTE — PROGRESS NOTES
General Surgery  Post-op Note    Reason for Consult: acute cholecystitis     History of Present Illness:   Apolinar Mason is a 65 y.o. male with Hx of HTN, atypical chest pain, and chronic back pain s/p lumbar fusion '17 and cervical fusion 6/30/22. He presents for follow up without complaints. Past Medical History:        Diagnosis Date    Arthritis     neck, back    Arthropathy     TISHA positive    Back pain     Depression     Gout     Hyperlipidemia     Hypertension     Wears dentures        Past Surgical History:        Procedure Laterality Date    ARM SURGERY Left 9/22/2020    LEFT ULNAR NERVE DECOMPRESSION (AT ELBOW) AND LEFT CARPAL TUNNEL RELEASE performed by Lupis Page MD at [place] [address] Right 10/13/2020    RIGHT ULNAR NERVE DECOMPRESSION AT ELBOW AND RIGHT CARPAL TUNNEL RELEASE performed by Lupis Page MD at [address]      2015    CERVICAL FUSION N/A 6/30/2022    C4-T1 ANTERIOR CERVICAL DISCECTOMY AND FUSION performed by Catracho Lawrence. Josiah Love MD at [address], LAPAROSCOPIC N/A 8/31/2022    CHOLECYSTECTOMY LAPAROSCOPIC WITH INTRAOPERATIVE CHOLANGIOGRAM performed by Eduardo Parson MD at [facility]      COLONOSCOPY N/A 12/10/2021    COLONOSCOPY POLYPECTOMY SNARE/COLD BIOPSY performed by Triny Ovalle MD at [facility] N/A 1/12/2022    COLONOSCOPY DIAGNOSTIC performed by Triny Ovalle MD at [address] [place] [address], [address] [place] [address]  2017    PLIF L3-S1 done in [place], [place]       Allergies:  Patient has no known allergies. Medications:   Home Meds  Current Outpatient Medications on File Prior to Visit   Medication Sig Dispense Refill    tiZANidine (ZANAFLEX) 4 MG tablet Take 1 tablet by mouth every 6 hours as needed (prn) Take 4 mg by mouth every 6 hours as needed 120 tablet 1    atorvastatin (LIPITOR) 20 MG tablet Take 1 tablet by mouth in the morning. To prevent heart disease.  90 tablet 1    aspirin EC 81 MG EC tablet Take 1 tablet by mouth in the morning. 90 tablet 1    nitroGLYCERIN (NITROSTAT) 0.4 MG SL tablet Place 1 tablet under the tongue every 5 minutes as needed for Chest pain up to max of 3 total doses. If no relief after 1 dose, call 911. 25 tablet 3    hydrALAZINE (APRESOLINE) 100 MG tablet Take one tablet   Twice a day for blood pressure 60 tablet 1    sennosides-docusate sodium (SENOKOT-S) 8.6-50 MG tablet Take 2 tablets by mouth daily as needed for Constipation      amLODIPine (NORVASC) 10 MG tablet Take 1 tablet by mouth daily 90 tablet 1    lisinopril (PRINIVIL;ZESTRIL) 40 MG tablet Take 1 tablet by mouth daily 90 tablet 1    allopurinol (ZYLOPRIM) 300 MG tablet Take 1 tablet by mouth daily For arthritis stop allopurinol 100 mg 90 tablet 1     No current facility-administered medications on file prior to visit. Family History:   Family History   Problem Relation Age of Onset    Cancer Mother     Hypertension Mother     Diabetes Maternal Uncle        Social History:   TOBACCO:   reports that he has never smoked. He has never used smokeless tobacco.  ETOH:   reports no history of alcohol use. DRUGS:   reports no history of drug use. Review of Systems:     Constitutional: Negative. HENT: Negative. Eyes: Negative. Respiratory: Negative. Cardiovascular: Negative. Gastrointestinal: Negative except for above  Genitourinary: Negative. Musculoskeletal: Negative. Skin: Negative. Endocrine: Negative. Allergic/Immunologic: Negative. Neurological: Negative. Hematological: Negative. Psychiatric/Behavioral: Negative.     Physical exam:    Vitals:    09/16/22 1244   BP: (!) 170/98   Pulse: 85   Weight: 201 lb 12.8 oz (91.5 kg)   Height: 6' 5\" (1.956 m)       General appearance: alert, fetal position, grabbing stomach in pain, grooming appropriate  Eyes: PERRL, no scleral icterus  Neck: trachea midline, no JVD, cervical collar in place   Chest/Lungs: normal effort, no adventitious breathing, no accessory muscle use, on RA  Cardiovascular: RRR  Abdomen: Incisions healing well, soft, non-distended, no guarding/rigidity  Skin: warm and dry, no rashes  Extremities: no edema, no cyanosis  Neuro: A&Ox3, no focal deficits, sensation intact    Assessment/Plan:  The patient is s/p laparoscopic cholecystectomy with gram. The patient is recovering well from surgery and is returning to normal activities. All questions were answered and I will see the patient back in the office on a PRN basis.      Ayana Gaines

## 2022-11-11 ENCOUNTER — OFFICE VISIT (OUTPATIENT)
Dept: PRIMARY CARE CLINIC | Age: 62
End: 2022-11-11
Payer: MEDICARE

## 2022-11-11 VITALS
HEIGHT: 77 IN | WEIGHT: 210 LBS | DIASTOLIC BLOOD PRESSURE: 80 MMHG | BODY MASS INDEX: 24.79 KG/M2 | TEMPERATURE: 97.3 F | SYSTOLIC BLOOD PRESSURE: 117 MMHG | HEART RATE: 87 BPM | OXYGEN SATURATION: 97 %

## 2022-11-11 DIAGNOSIS — Z23 NEEDS FLU SHOT: ICD-10-CM

## 2022-11-11 DIAGNOSIS — E78.2 MIXED HYPERLIPIDEMIA: ICD-10-CM

## 2022-11-11 DIAGNOSIS — M10.9 GOUTY ARTHRITIS: ICD-10-CM

## 2022-11-11 DIAGNOSIS — I10 PRIMARY HYPERTENSION: Primary | ICD-10-CM

## 2022-11-11 PROCEDURE — 3074F SYST BP LT 130 MM HG: CPT | Performed by: FAMILY MEDICINE

## 2022-11-11 PROCEDURE — 3078F DIAST BP <80 MM HG: CPT | Performed by: FAMILY MEDICINE

## 2022-11-11 PROCEDURE — 99214 OFFICE O/P EST MOD 30 MIN: CPT | Performed by: FAMILY MEDICINE

## 2022-11-11 PROCEDURE — 90674 CCIIV4 VAC NO PRSV 0.5 ML IM: CPT | Performed by: FAMILY MEDICINE

## 2022-11-11 PROCEDURE — G0008 ADMIN INFLUENZA VIRUS VAC: HCPCS | Performed by: FAMILY MEDICINE

## 2022-11-11 RX ORDER — LISINOPRIL 40 MG/1
40 TABLET ORAL DAILY
Qty: 90 TABLET | Refills: 1 | Status: SHIPPED | OUTPATIENT
Start: 2022-11-11

## 2022-11-11 RX ORDER — ALLOPURINOL 300 MG/1
300 TABLET ORAL DAILY
Qty: 90 TABLET | Refills: 1 | Status: SHIPPED | OUTPATIENT
Start: 2022-11-11

## 2022-11-11 RX ORDER — AMLODIPINE BESYLATE 10 MG/1
10 TABLET ORAL DAILY
Qty: 90 TABLET | Refills: 1 | Status: SHIPPED | OUTPATIENT
Start: 2022-11-11

## 2022-11-11 SDOH — ECONOMIC STABILITY: FOOD INSECURITY: WITHIN THE PAST 12 MONTHS, YOU WORRIED THAT YOUR FOOD WOULD RUN OUT BEFORE YOU GOT MONEY TO BUY MORE.: NEVER TRUE

## 2022-11-11 SDOH — ECONOMIC STABILITY: FOOD INSECURITY: WITHIN THE PAST 12 MONTHS, THE FOOD YOU BOUGHT JUST DIDN'T LAST AND YOU DIDN'T HAVE MONEY TO GET MORE.: NEVER TRUE

## 2022-11-11 ASSESSMENT — ENCOUNTER SYMPTOMS
APNEA: 0
COLOR CHANGE: 0
EYE REDNESS: 0
WHEEZING: 0
SINUS PRESSURE: 0
RECTAL PAIN: 0
ABDOMINAL PAIN: 0
COUGH: 0
ANAL BLEEDING: 0
CONSTIPATION: 0
VOICE CHANGE: 0
EYE DISCHARGE: 0
CHEST TIGHTNESS: 0
TROUBLE SWALLOWING: 0
NAUSEA: 0
EYE PAIN: 0
PHOTOPHOBIA: 0
CHOKING: 0
VOMITING: 0
BACK PAIN: 0
BLOOD IN STOOL: 0
SORE THROAT: 0
SHORTNESS OF BREATH: 0
EYE ITCHING: 0
FACIAL SWELLING: 0

## 2022-11-11 ASSESSMENT — PATIENT HEALTH QUESTIONNAIRE - PHQ9
10. IF YOU CHECKED OFF ANY PROBLEMS, HOW DIFFICULT HAVE THESE PROBLEMS MADE IT FOR YOU TO DO YOUR WORK, TAKE CARE OF THINGS AT HOME, OR GET ALONG WITH OTHER PEOPLE: 0
5. POOR APPETITE OR OVEREATING: 0
SUM OF ALL RESPONSES TO PHQ QUESTIONS 1-9: 4
1. LITTLE INTEREST OR PLEASURE IN DOING THINGS: 0
SUM OF ALL RESPONSES TO PHQ9 QUESTIONS 1 & 2: 0
3. TROUBLE FALLING OR STAYING ASLEEP: 3
2. FEELING DOWN, DEPRESSED OR HOPELESS: 0
SUM OF ALL RESPONSES TO PHQ QUESTIONS 1-9: 4
6. FEELING BAD ABOUT YOURSELF - OR THAT YOU ARE A FAILURE OR HAVE LET YOURSELF OR YOUR FAMILY DOWN: 0
7. TROUBLE CONCENTRATING ON THINGS, SUCH AS READING THE NEWSPAPER OR WATCHING TELEVISION: 0
SUM OF ALL RESPONSES TO PHQ QUESTIONS 1-9: 4
8. MOVING OR SPEAKING SO SLOWLY THAT OTHER PEOPLE COULD HAVE NOTICED. OR THE OPPOSITE, BEING SO FIGETY OR RESTLESS THAT YOU HAVE BEEN MOVING AROUND A LOT MORE THAN USUAL: 0
9. THOUGHTS THAT YOU WOULD BE BETTER OFF DEAD, OR OF HURTING YOURSELF: 0
4. FEELING TIRED OR HAVING LITTLE ENERGY: 1
SUM OF ALL RESPONSES TO PHQ QUESTIONS 1-9: 4

## 2022-11-11 ASSESSMENT — SOCIAL DETERMINANTS OF HEALTH (SDOH): HOW HARD IS IT FOR YOU TO PAY FOR THE VERY BASICS LIKE FOOD, HOUSING, MEDICAL CARE, AND HEATING?: NOT HARD AT ALL

## 2022-11-11 NOTE — PROGRESS NOTES
CC 3 months fu        Purvi Painter (:  1960) is a 65 y.o. male,Established patient, here for evaluation of the following chief complaint(s):  3 Month Follow-Up         ASSESSMENT/PLAN:  1. Primary hypertension    Bp at goal < 140/90  With current TX  Recent labs/renal  Low salt diet  Meds refill  -     amLODIPine (NORVASC) 10 MG tablet; Take 1 tablet by mouth daily, Disp-90 tablet, R-1**Patient requests 90 days supply**Normal  -     lisinopril (PRINIVIL;ZESTRIL) 40 MG tablet; Take 1 tablet by mouth daily, Disp-90 tablet, R-1Normal  2. Gouty arthritis\no recent attack/flare  Ck labs  Meds refills  -     allopurinol (ZYLOPRIM) 300 MG tablet; Take 1 tablet by mouth daily For arthritis stop allopurinol 100 mg, Disp-90 tablet, R-1**Patient requests 90 days supply**Normal  3. Mixed hyperlipidemia  No statin required so far    Last LDL normal 66 normal  Last  HDL 31 low  The ASCVD Risk score (Ahsan DK, et al., 2019) failed to calculate for the following reasons: The valid total cholesterol range is 130 to 320 mg/dL  Exercise , low fat diet    Re evaluate in 6 months  -    4. Needs flu shot  -     Influenza, FLUCELVAX, (age 10 mo+), IM, Preservative Free, 0.5 mL      No follow-ups on file. Subjective   SUBJECTIVE/OBJECTIVE:  HPI 65 y.o. male PMH HTN GOUTY ARTHRITIS (MOSTLY R HAND) HYPERLIPIDEMA        Hypertension  No chest pain, headache, sob, leg edema, stroke, kidney disease       Gouty arthritis  R hand   Last flare up > year    Out of  allopurinol now      Hyperlipidemia  No muscle aches or muscle weakness or cramps since last visit. No current meds for this        Review of Systems   Constitutional:  Negative for activity change, appetite change, chills, diaphoresis, fatigue, fever and unexpected weight change.    HENT:  Negative for congestion, dental problem, drooling, ear discharge, ear pain, facial swelling, hearing loss, mouth sores, nosebleeds, postnasal drip, sinus pressure, sneezing, sore throat, tinnitus, trouble swallowing and voice change. Eyes:  Negative for photophobia, pain, discharge, redness, itching and visual disturbance. Respiratory:  Negative for apnea, cough, choking, chest tightness, shortness of breath and wheezing. Cardiovascular:  Negative for chest pain, palpitations and leg swelling. Gastrointestinal:  Negative for abdominal pain, anal bleeding, blood in stool, constipation, nausea, rectal pain and vomiting. Genitourinary:  Negative for decreased urine volume, difficulty urinating, dysuria, enuresis, flank pain, frequency, hematuria, penile discharge, penile swelling, scrotal swelling, testicular pain and urgency. Musculoskeletal:  Negative for arthralgias, back pain, gait problem, joint swelling, myalgias, neck pain and neck stiffness. Skin:  Negative for color change, pallor, rash and wound. Neurological:  Negative for dizziness, tremors, seizures, syncope, facial asymmetry, speech difficulty, weakness, light-headedness, numbness and headaches. Hematological:  Negative for adenopathy. Does not bruise/bleed easily. Psychiatric/Behavioral:  Negative for agitation, behavioral problems, confusion, decreased concentration, dysphoric mood, hallucinations, self-injury, sleep disturbance and suicidal ideas. The patient is not nervous/anxious and is not hyperactive. Objective   Physical Exam  Constitutional:       General: He is not in acute distress. Appearance: He is well-developed. He is not diaphoretic. HENT:      Head: Normocephalic and atraumatic. Right Ear: External ear normal.      Left Ear: External ear normal.      Nose: Nose normal.      Mouth/Throat:      Pharynx: No oropharyngeal exudate. Eyes:      General: No scleral icterus. Right eye: No discharge. Left eye: No discharge. Conjunctiva/sclera: Conjunctivae normal.      Pupils: Pupils are equal, round, and reactive to light. Neck:      Thyroid: No thyromegaly. Vascular: No JVD. Trachea: No tracheal deviation. Cardiovascular:      Rate and Rhythm: Normal rate and regular rhythm. Pulses:           Carotid pulses are 2+ on the right side and 2+ on the left side. Radial pulses are 2+ on the right side and 2+ on the left side. Femoral pulses are 2+ on the right side and 2+ on the left side. Popliteal pulses are 2+ on the right side and 2+ on the left side. Dorsalis pedis pulses are 2+ on the right side and 2+ on the left side. Posterior tibial pulses are 2+ on the right side and 2+ on the left side. Heart sounds: Normal heart sounds. No murmur heard. No friction rub. Pulmonary:      Effort: Pulmonary effort is normal. No respiratory distress. Breath sounds: Normal breath sounds. No stridor. No wheezing or rales. Chest:      Chest wall: No tenderness. Abdominal:      General: Bowel sounds are normal. There is no distension. Palpations: Abdomen is soft. There is no mass. Tenderness: There is no abdominal tenderness. There is no guarding or rebound. Musculoskeletal:         General: No tenderness. Normal range of motion. Cervical back: Normal range of motion and neck supple. Lymphadenopathy:      Cervical: No cervical adenopathy. Skin:     General: Skin is warm and dry. Coloration: Skin is not pale. Findings: No rash. Neurological:      Mental Status: He is oriented to person, place, and time. Cranial Nerves: No cranial nerve deficit. Motor: No abnormal muscle tone. Coordination: Coordination normal.      Deep Tendon Reflexes: Reflexes normal.   Psychiatric:         Behavior: Behavior normal.         Thought Content:  Thought content normal.         Judgment: Judgment normal.          On this date 11/11/2022 I have spent  minutes reviewing previous notes, test results and face to face with the patient discussing the diagnosis and importance of compliance with the treatment plan as well as documenting on the day of the visit. An electronic signature was used to authenticate this note.     --Marielle Arellano MD

## 2022-11-14 DIAGNOSIS — M10.9 GOUTY ARTHRITIS: ICD-10-CM

## 2022-11-15 LAB — URIC ACID, SERUM: 4.9 MG/DL (ref 3.5–7.2)

## 2022-12-15 ENCOUNTER — TELEPHONE (OUTPATIENT)
Dept: PRIMARY CARE CLINIC | Age: 62
End: 2022-12-15

## 2022-12-15 NOTE — TELEPHONE ENCOUNTER
Pt called in stating that he was experiencing chest pains I advised him to go to the er    [facility]

## 2022-12-26 DIAGNOSIS — R07.89 ATYPICAL CHEST PAIN: ICD-10-CM

## 2022-12-28 RX ORDER — NITROGLYCERIN 0.4 MG/1
TABLET SUBLINGUAL
Qty: 25 TABLET | Refills: 3 | Status: SHIPPED | OUTPATIENT
Start: 2022-12-28

## 2023-01-05 DIAGNOSIS — I10 PRIMARY HYPERTENSION: ICD-10-CM

## 2023-01-06 RX ORDER — LISINOPRIL 40 MG/1
40 TABLET ORAL DAILY
Qty: 90 TABLET | Refills: 1 | Status: SHIPPED | OUTPATIENT
Start: 2023-01-06

## 2023-02-26 ENCOUNTER — HOSPITAL ENCOUNTER (EMERGENCY)
Age: 63
Discharge: HOME | End: 2023-02-26
Attending: EMERGENCY MEDICINE
Payer: MEDICARE

## 2023-02-26 ENCOUNTER — APPOINTMENT (OUTPATIENT)
Dept: CT IMAGING | Age: 63
End: 2023-02-26
Payer: MEDICARE

## 2023-02-26 ENCOUNTER — APPOINTMENT (OUTPATIENT)
Dept: GENERAL RADIOLOGY | Age: 63
End: 2023-02-26
Payer: MEDICARE

## 2023-02-26 VITALS
RESPIRATION RATE: 18 BRPM | DIASTOLIC BLOOD PRESSURE: 83 MMHG | BODY MASS INDEX: 28.35 KG/M2 | SYSTOLIC BLOOD PRESSURE: 117 MMHG | WEIGHT: 220.9 LBS | TEMPERATURE: 97.7 F | HEART RATE: 96 BPM | HEIGHT: 74 IN | OXYGEN SATURATION: 100 %

## 2023-02-26 DIAGNOSIS — R07.89 CHEST WALL PAIN: Primary | ICD-10-CM

## 2023-02-26 LAB
A/G RATIO: 1.1 (ref 1.1–2.2)
ALBUMIN SERPL-MCNC: 4.2 G/DL (ref 3.4–5)
ALP BLD-CCNC: 89 U/L (ref 40–129)
ALT SERPL-CCNC: 13 U/L (ref 10–40)
ANION GAP SERPL CALCULATED.3IONS-SCNC: 11 MMOL/L (ref 3–16)
AST SERPL-CCNC: 15 U/L (ref 15–37)
BASOPHILS ABSOLUTE: 0.1 K/UL (ref 0–0.2)
BASOPHILS RELATIVE PERCENT: 1.2 %
BILIRUB SERPL-MCNC: <0.2 MG/DL (ref 0–1)
BUN BLDV-MCNC: 14 MG/DL (ref 7–20)
CALCIUM SERPL-MCNC: 9.6 MG/DL (ref 8.3–10.6)
CHLORIDE BLD-SCNC: 106 MMOL/L (ref 99–110)
CO2: 23 MMOL/L (ref 21–32)
CREAT SERPL-MCNC: 1 MG/DL (ref 0.8–1.3)
EOSINOPHILS ABSOLUTE: 0.1 K/UL (ref 0–0.6)
EOSINOPHILS RELATIVE PERCENT: 2.5 %
GFR SERPL CREATININE-BSD FRML MDRD: >60 ML/MIN/{1.73_M2}
GLUCOSE BLD-MCNC: 106 MG/DL (ref 70–99)
HCT VFR BLD CALC: 37.9 % (ref 40.5–52.5)
HEMOGLOBIN: 12.7 G/DL (ref 13.5–17.5)
LYMPHOCYTES ABSOLUTE: 2.2 K/UL (ref 1–5.1)
LYMPHOCYTES RELATIVE PERCENT: 38.6 %
MCH RBC QN AUTO: 26.8 PG (ref 26–34)
MCHC RBC AUTO-ENTMCNC: 33.4 G/DL (ref 31–36)
MCV RBC AUTO: 80.2 FL (ref 80–100)
MONOCYTES ABSOLUTE: 0.5 K/UL (ref 0–1.3)
MONOCYTES RELATIVE PERCENT: 7.9 %
NEUTROPHILS ABSOLUTE: 2.9 K/UL (ref 1.7–7.7)
NEUTROPHILS RELATIVE PERCENT: 49.8 %
PDW BLD-RTO: 16 % (ref 12.4–15.4)
PLATELET # BLD: 234 K/UL (ref 135–450)
PMV BLD AUTO: 8.2 FL (ref 5–10.5)
POTASSIUM REFLEX MAGNESIUM: 4.2 MMOL/L (ref 3.5–5.1)
PRO-BNP: <5 PG/ML (ref 0–124)
RBC # BLD: 4.72 M/UL (ref 4.2–5.9)
SODIUM BLD-SCNC: 140 MMOL/L (ref 136–145)
TOTAL PROTEIN: 8.2 G/DL (ref 6.4–8.2)
TROPONIN: <0.01 NG/ML
WBC # BLD: 5.8 K/UL (ref 4–11)

## 2023-02-26 PROCEDURE — 71260 CT THORAX DX C+: CPT | Performed by: EMERGENCY MEDICINE

## 2023-02-26 PROCEDURE — 84484 ASSAY OF TROPONIN QUANT: CPT

## 2023-02-26 PROCEDURE — 80053 COMPREHEN METABOLIC PANEL: CPT

## 2023-02-26 PROCEDURE — 85025 COMPLETE CBC W/AUTO DIFF WBC: CPT

## 2023-02-26 PROCEDURE — 99285 EMERGENCY DEPT VISIT HI MDM: CPT

## 2023-02-26 PROCEDURE — 71045 X-RAY EXAM CHEST 1 VIEW: CPT

## 2023-02-26 PROCEDURE — 83880 ASSAY OF NATRIURETIC PEPTIDE: CPT

## 2023-02-26 PROCEDURE — 93005 ELECTROCARDIOGRAM TRACING: CPT | Performed by: EMERGENCY MEDICINE

## 2023-02-26 PROCEDURE — 6360000004 HC RX CONTRAST MEDICATION: Performed by: EMERGENCY MEDICINE

## 2023-02-26 PROCEDURE — 6360000002 HC RX W HCPCS: Performed by: EMERGENCY MEDICINE

## 2023-02-26 PROCEDURE — 96374 THER/PROPH/DIAG INJ IV PUSH: CPT

## 2023-02-26 RX ORDER — NAPROXEN 500 MG/1
500 TABLET ORAL 2 TIMES DAILY
Qty: 20 TABLET | Refills: 0 | Status: SHIPPED | OUTPATIENT
Start: 2023-02-26 | End: 2023-03-08

## 2023-02-26 RX ORDER — KETOROLAC TROMETHAMINE 30 MG/ML
30 INJECTION, SOLUTION INTRAMUSCULAR; INTRAVENOUS ONCE
Status: COMPLETED | OUTPATIENT
Start: 2023-02-26 | End: 2023-02-26

## 2023-02-26 RX ADMIN — IOPAMIDOL 75 ML: 755 INJECTION, SOLUTION INTRAVENOUS at 20:56

## 2023-02-26 RX ADMIN — KETOROLAC TROMETHAMINE 30 MG: 30 INJECTION, SOLUTION INTRAMUSCULAR; INTRAVENOUS at 20:15

## 2023-02-27 ENCOUNTER — CARE COORDINATION (OUTPATIENT)
Dept: CARE COORDINATION | Age: 63
End: 2023-02-27

## 2023-02-27 LAB
EKG ATRIAL RATE: 93 BPM
EKG DIAGNOSIS: NORMAL
EKG P AXIS: 80 DEGREES
EKG P-R INTERVAL: 148 MS
EKG Q-T INTERVAL: 322 MS
EKG QRS DURATION: 92 MS
EKG QTC CALCULATION (BAZETT): 400 MS
EKG R AXIS: 55 DEGREES
EKG T AXIS: 58 DEGREES
EKG VENTRICULAR RATE: 93 BPM

## 2023-02-27 PROCEDURE — 93010 ELECTROCARDIOGRAM REPORT: CPT | Performed by: INTERNAL MEDICINE

## 2023-02-27 NOTE — CARE COORDINATION
Ambulatory Care Coordination Note  2023    Patient Current Location:  Home: [address] [address]    ACM contacted the patient by telephone. Verified name and  with patient as identifiers. Provided introduction to self, and explanation of the ACM role. ACM: Jenna Muller RN    Challenges to be reviewed by the provider   Additional needs identified to be addressed with provider: Yes  c/o chest wall pain 'unchanged' since seen @ ED yesterday (.)    Pt has not picked up naproxen, per ED prescribed - plans to  @ pharmacy today. Denies any acute distress. Does not quantify c/o chest wall pain via 0-10 scale, states only \"it's about the same really\". No noted distress of voice - able to speak w/o guarded breathing. Denies experiencing any associated s/s. States willingness to direct outreach to PCP to schedule ED follow up appt, per DC instructions. Method of communication with provider: chart routing for review of note & ACC Plan to Enroll for ongoing Care Coordination outreach    Reviewed DC instructions, pt status, medications, and availability of added support via Care Coordination  Reports diastolic BP has been ranging , with 101 as outlier over weekend; states systolic WNL    Offered patient enrollment in the Remote Patient Monitoring (RPM) program for in-home monitoring: Patient declined need/desire to participate in RPM for monitoring BP, HR or any other clinical monitoring - states he self monitors BP using his own device. Reviewed s/s to report, encouraged pt in soonest outreach direct to PCP to schedule ED follow up appt.   Pt verbalized understanding of above and agreement with the following  Plan: pt will directly outreach to PCP to schedule ED follow up visit w/PCP  Pt will continue self monitoring BP & promptly report to provider any adverse s/s and/or BP departure from normal range (sys /79-07 diastolic)  Initiate ACC support for health coaching, care collaboration, support w/community resources, and help with any newly presenting concerns as needed. Pt agrees to outreach direct to ACM, as needed, between routine follow up outreach initiated by [facility]   Ambulatory Care Coordination Assessment    Care Coordination Protocol  Referral from Primary Care Provider: No  Week 1 - Initial Assessment     Do you have all of your prescriptions and are they filled?: Yes (Comment: reviewed 2.27.23)  Barriers to medication adherence: None  Are you able to afford your medications?: Yes  How often do you have trouble taking your medications the way you have been told to take them?: I always take them as prescribed. Do you have Home O2 Therapy?: No      Ability to seek help/take action for Emergent Urgent situations i.e. fire, crime, inclement weather or health crisis. : Independent  Ability to ambulate to restroom: Independent  Ability handle personal hygeine needs (bathing/dressing/grooming): Independent  Ability to manage Medications: Independent  Ability to prepare Food Preparation: Independent  Ability to maintain home (clean home, laundry): Independent  Ability to drive and/or has transportation: Independent  Ability to do shopping: Independent  Ability to manage finances:  Independent  Is patient able to live independently?: Yes     Current Housing: Private Residence        Per the Fall Risk Screening, did the patient have 2 or more falls or 1 fall with injury in the past year?: No     Frequent urination at night?: No  Do you use rails/bars?: Yes  Do you have a non-slip tub mat?: Yes     Are you experiencing loss of meaning?: No  Are you experiencing loss of hope and peace?: No     Thinking about your patient's physical health needs, are there any symptoms or problems (risk indicators) you are unsure about that require further investigation?: No identified areas of uncertainly or problems already being investigated   Are the patients physical health problems impacting on their mental well-being?: No identified areas of concern   Are there any problems with your patients lifestyle behaviors (alcohol, drugs, diet, exercise) that are impacting on physical or mental well-being?: No identified areas of concern   Do you have any other concerns about your patients mental well-being? How would you rate their severity and impact on the patient?: No identified areas of concern   How would you rate their home environment in terms of safety and stability (including domestic violence, insecure housing, neighbor harassment)?: Consistently safe, supportive, stable, no identified problems   How do daily activities impact on the patient's well-being? (include current or anticipated unemployment, work, caregiving, access to transportation or other): No identified problems or perceived positive benefits   How would you rate their social network (family, work, friends)?: Good participation with social networks   How would you rate their financial resources (including ability to afford all required medical care)?: Financially secure, resources adequate, no identified problems   How wells does the patient now understand their health and well-being (symptoms, signs or risk factors) and what they need to do to manage their health?: Reasonable to good understanding and already engages in managing health or is willing to undertake better management   How well do you think your patient can engage in healthcare discussions? (Barriers include language, deafness, aphasia, alcohol or drug problems, learning difficulties, concentration): Clear and open communication, no identified barriers   Do other services need to be involved to help this patient?: Other care/services not required at this time   Are current services involved with this patient well-coordinated? (Include coordination with other services you are now recommendation):  All required care/services in place and well-coordinated Suggested Interventions and [facility]                  Future Appointments   Date Time Provider Óscar Oleary   5/12/2023 10:00 AM MD Carla Broderick RD [facility]

## 2023-02-27 NOTE — ED PROVIDER NOTES
[address]  eMERGENCY dEPARTMENT eNCOUnter      Pt Name: Marisa Hughes  MRN: [id number]  [place] 1960  Date of evaluation: 2/26/2023  Provider: Alek Wilson MD  PCP: Gladys Longoria MD      [address]       Chief Complaint   Patient presents with    Chest Pain       HISTORY OFPRESENT ILLNESS   (Location/Symptom, Timing/Onset, Context/Setting, Quality, Duration, Modifying Factors,Severity)  Note limiting factors. Marisa Hughes is a 59 y.o. male that has been having pain in his chest for the last 2 weeks it hurts to touch its not exertional it is not pleuritic he does not have a cough associated with it he does not have a fever he denies any trauma he denies any history of coronary artery disease. Pain is constantly there and sometimes feels worse than other times he says he had a stress test about 6 to 8 months ago that was essentially    Nursing Notes were all reviewed and agreed with or any disagreements were addressed  in the HPI. REVIEW OF SYSTEMS    (2-9 systems for level 4, 10 or more for level 5)     Review of Systems    Positives and Pertinent negatives as per HPI. Except as noted above in the ROS, all other systems were reviewed andnegative.        PASTMEDICAL HISTORY     Past Medical History:   Diagnosis Date    Arthritis     neck, back    Arthropathy     TISHA positive    Back pain     Depression     Gout     Hyperlipidemia     Hypertension     Wears dentures          SURGICAL HISTORY       Past Surgical History:   Procedure Laterality Date    ARM SURGERY Left 9/22/2020    LEFT ULNAR NERVE DECOMPRESSION (AT ELBOW) AND LEFT CARPAL TUNNEL RELEASE performed by Dandy Quesada MD at [place] [address] Right 10/13/2020    RIGHT ULNAR NERVE DECOMPRESSION AT ELBOW AND RIGHT CARPAL TUNNEL RELEASE performed by Dandy Quesada MD at [address] [place] [address]      2015    CERVICAL FUSION N/A 6/30/2022    C4-T1 ANTERIOR CERVICAL DISCECTOMY AND FUSION performed by Claribel Quinones. Josaih Love MD at [address] [facility] [address], LAPAROSCOPIC N/A 8/31/2022    CHOLECYSTECTOMY LAPAROSCOPIC WITH INTRAOPERATIVE CHOLANGIOGRAM performed by Lizeth Rivera MD at [facility] 124 12/10/2021    COLONOSCOPY POLYPECTOMY SNARE/COLD BIOPSY performed by Caitlyn Velarde MD at [facility] 103 N/A 1/12/2022    COLONOSCOPY DIAGNOSTIC performed by Caitlyn Velarde MD at [address] [place] [address], COLON, DIAGNOSTIC      LUMBAR FUSION  2017    PLIF L3-S1 done in [place], [place]         CURRENT MEDICATIONS       Previous Medications    ALLOPURINOL (ZYLOPRIM) 300 MG TABLET    Take 1 tablet by mouth daily For arthritis stop allopurinol 100 mg    AMLODIPINE (NORVASC) 10 MG TABLET    Take 1 tablet by mouth daily    ASPIRIN EC 81 MG EC TABLET    Take 1 tablet by mouth in the morning. ATORVASTATIN (LIPITOR) 20 MG TABLET    Take 1 tablet by mouth in the morning. To prevent heart disease. HYDRALAZINE (APRESOLINE) 100 MG TABLET    Take one tablet   Twice a day for blood pressure    LISINOPRIL (PRINIVIL;ZESTRIL) 40 MG TABLET    TAKE 1 TABLET BY MOUTH DAILY    NITROGLYCERIN (NITROSTAT) 0.4 MG SL TABLET    DISSOLVE 1 TABLET UNDER THE TONGUE EVERY 5 MINUTES AS NEEDED FOR CHEST PAIN UP TO MAX OF 3 TOTAL DOSES. IF NO RELIEF AFTER 1 DOSE, CALL 911. SENNOSIDES-DOCUSATE SODIUM (SENOKOT-S) 8.6-50 MG TABLET    Take 2 tablets by mouth daily as needed for Constipation    TIZANIDINE (ZANAFLEX) 4 MG TABLET    Take 1 tablet by mouth every 6 hours as needed (prn) Take 4 mg by mouth every 6 hours as needed       ALLERGIES     Patient has no known allergies.     FAMILY HISTORY       Family History   Problem Relation Age of Onset    Cancer Mother     Hypertension Mother     Diabetes Maternal Uncle           SOCIAL HISTORY       Social History     Socioeconomic History    Marital status:      Spouse name: None    Number of children: None    Years of education: None    Highest education level: None   Occupational History    Occupation: Disabled     Comment: 2014   Tobacco Use    Smoking status: Never    Smokeless tobacco: Never   Vaping Use    Vaping Use: Never used   Substance and Sexual Activity    Alcohol use: No    Drug use: No    Sexual activity: Yes     Social Determinants of Health     Financial Resource Strain: Low Risk     Difficulty of Paying Living Expenses: Not hard at all   Food Insecurity: No Food Insecurity    Worried About [employer] [email or web address] in the Last Year: Never true    [facility] [address] in the Last Year: Never true       SCREENINGS      @FLOW([id number])@      PHYSICAL EXAM    (up to 7 for level 4, 8 or more for level 5)     ED Triage Vitals [02/26/23 1930]   BP Temp Temp Source Heart Rate Resp SpO2 Height Weight   (!) 163/89 97.7 °F (36.5 °C) Oral 96 18 100 % 6' 2\" (1.88 m) 220 lb 14.4 oz (100.2 kg)       Physical Exam      General Appearance:  Alert, cooperative, no distress, appears stated age. Head:  Normocephalic, without obviousabnormality, atraumatic. Eyes:  conjunctiva/corneas clear, EOM's intact. Sclera anicteric. ENT: Mucous membranes moist.   Neck: Supple, symmetrical, trachea midline, no adenopathy. No jugular venous distention. Lungs:   Clear to auscultation bilaterally, respirationsunlabored. No rales, rhonchi or wheezes. Chest Wall:  No tenderness. Heart:  Regular rate and rhythm, S1 and S2 normal, no murmur, rub or gallop. Abdomen:   Soft, non-tender, bowel sounds active,   no masses, no organomegaly. Extremities: No edema, cords or calf tenderness. Full range of motion. Pulses: 2+ and symmetric   Skin: Turgor is normal, no rashes or lesions. Neurologic: Alert and oriented X 3. No focal findings.   Motor grossly normal.  Speech clear, no drift, CN III-XII grossly intact,        DIAGNOSTIC RESULTS   LABS:    Labs Reviewed   CBC WITH AUTO DIFFERENTIAL - Abnormal; Notable for the following components:       Result Value    Hemoglobin 12.7 (*)     Hematocrit 37.9 (*)     RDW 16.0 (*)     All other components within normal limits   COMPREHENSIVE METABOLIC PANEL W/ REFLEX TO MG FOR LOW K - Abnormal; Notable for the following components:    Glucose 106 (*)     All other components within normal limits   TROPONIN   BRAIN NATRIURETIC PEPTIDE       All other labs were within normal range or not returned as of this dictation.    EKG: All EKG's are interpreted by the Emergency Department Physician who eithersigns or Co-signs this chart in the absence of a cardiologist.    The Ekg interpreted by me in the absence of a cardiologist shows.  Normal Sinus rhythm   Rate of   93  Axis is   Normal  QTc is  normal  Intervals and Durations are unremarkable.      Nonspecific ST-T wave changes appreciated.  No evidence of acute ischemia.            RADIOLOGY:   Non-plain film images such as CT, Ultrasound and MRI are read by the radiologist. Plain radiographic images are visualized by myself.      *    Interpretation per the Radiologist below, if available at the time of this note:    CT CHEST PULMONARY EMBOLISM W CONTRAST   Final Result      No evidence of pulmonary embolism or acute intrathoracic abnormality.      INCIDENTAL FINDINGS: None.      XR CHEST PORTABLE   Final Result      No acute radiographic abnormality of the chest.            PROCEDURES   Unless otherwise noted below, none     Procedures    *    CRITICAL CARE TIME   N/A      EMERGENCY DEPARTMENT COURSE and DIFFERENTIALDIAGNOSIS/MDM:   Vitals:    Vitals:    02/26/23 1930 02/26/23 1956 02/26/23 1957   BP: (!) 163/89 119/77 117/83   Pulse: 96     Resp: 18     Temp: 97.7 °F (36.5 °C)     TempSrc: Oral     SpO2: 100%     Weight: 220 lb 14.4 oz (100.2 kg)     Height: 6' 2\" (1.88 m)         Patient was given thefollowing medications:  Medications   ketorolac (TORADOL) injection 30 mg (30 mg IntraVENous Given 2/26/23 2015)   iopamidol (ISOVUE-370) 76 % injection 80 mL (75 mLs IntraVENous Given 2/26/23 2056)  The patient tolerated their visit well. The patient and / or the familywere informed of the results of any tests, a time was given to answer questions. FINAL IMPRESSION      1. Chest wall pain          DISPOSITION/PLAN   DISPOSITION Decision To Discharge 02/26/2023 09:11:57 PM  EKG nondiagnostic cardiac enzymes negative I felt it was only necessary to do 1 since has been having the pain for 2 weeks chest x-ray clear blood pressure in both arms shows no significant difference CT chest shows no abnormalities in the chest no pulmonary emboli. The patient presents with chest pain. The patient has been evaluated and the history and physical exam suggest a benign etiology. I see nothing to suggest coronary ischemia, myocardial infarction, pulmonary embolism, thoracic aortic dissection, significant pericarditis, pneumonia, pneumothorax, or acute abdomen. I feel the patient can be safely discharged to home with outpatient follow up. Instructions have been given for the patient to return to the ED for any worsening of the symptoms, including but not limited to increased pain, shortness of breath, abdominal pain or weakness.      PATIENT REFERRED TO:  Mat Elizabeth MD  [address] [phone]    In 2 days      DISCHARGE MEDICATIONS:  New Prescriptions    NAPROXEN (NAPROSYN) 500 MG TABLET    Take 1 tablet by mouth 2 times daily for 20 doses       DISCONTINUED MEDICATIONS:  Discontinued Medications    No medications on file              (Please note that portions of this note were completed with a voice recognition program.  Efforts were made to edit the dictations but occasionally words are mis-transcribed.)    Glenis Rutledge MD (electronically signed)       Glenis Rutledge MD  02/26/23 5461

## 2023-02-27 NOTE — ED TRIAGE NOTES
Pt. Presents with chest tenderness, pain is reproducible with palpitation, pain x 2 weeks, hx. Htn, hyperlipidemia

## 2023-03-03 ENCOUNTER — OFFICE VISIT (OUTPATIENT)
Dept: PRIMARY CARE CLINIC | Age: 63
End: 2023-03-03
Payer: MEDICARE

## 2023-03-03 VITALS
TEMPERATURE: 98.6 F | OXYGEN SATURATION: 98 % | SYSTOLIC BLOOD PRESSURE: 146 MMHG | DIASTOLIC BLOOD PRESSURE: 89 MMHG | HEART RATE: 117 BPM | BODY MASS INDEX: 28.76 KG/M2 | RESPIRATION RATE: 18 BRPM | WEIGHT: 224 LBS

## 2023-03-03 DIAGNOSIS — R07.89 CHEST WALL PAIN: Primary | ICD-10-CM

## 2023-03-03 DIAGNOSIS — E78.2 MIXED HYPERLIPIDEMIA: ICD-10-CM

## 2023-03-03 DIAGNOSIS — I10 PRIMARY HYPERTENSION: ICD-10-CM

## 2023-03-03 DIAGNOSIS — E79.0 HYPERURICEMIA: ICD-10-CM

## 2023-03-03 PROCEDURE — 3077F SYST BP >= 140 MM HG: CPT | Performed by: FAMILY MEDICINE

## 2023-03-03 PROCEDURE — 3079F DIAST BP 80-89 MM HG: CPT | Performed by: FAMILY MEDICINE

## 2023-03-03 PROCEDURE — 99214 OFFICE O/P EST MOD 30 MIN: CPT | Performed by: FAMILY MEDICINE

## 2023-03-03 RX ORDER — ALLOPURINOL 300 MG/1
300 TABLET ORAL DAILY
Qty: 90 TABLET | Refills: 1 | Status: SHIPPED | OUTPATIENT
Start: 2023-03-03

## 2023-03-03 RX ORDER — AMLODIPINE BESYLATE 10 MG/1
10 TABLET ORAL DAILY
Qty: 90 TABLET | Refills: 1 | Status: SHIPPED | OUTPATIENT
Start: 2023-03-03

## 2023-03-03 RX ORDER — ATORVASTATIN CALCIUM 20 MG/1
20 TABLET, FILM COATED ORAL DAILY
Qty: 90 TABLET | Refills: 1 | Status: SHIPPED | OUTPATIENT
Start: 2023-03-03

## 2023-03-03 ASSESSMENT — ENCOUNTER SYMPTOMS
DIARRHEA: 0
SHORTNESS OF BREATH: 0
TROUBLE SWALLOWING: 0
WHEEZING: 0
VOICE CHANGE: 0
COUGH: 0
ABDOMINAL PAIN: 0
BLOOD IN STOOL: 0
CONSTIPATION: 0

## 2023-03-03 ASSESSMENT — PATIENT HEALTH QUESTIONNAIRE - PHQ9
3. TROUBLE FALLING OR STAYING ASLEEP: 1
SUM OF ALL RESPONSES TO PHQ QUESTIONS 1-9: 3
8. MOVING OR SPEAKING SO SLOWLY THAT OTHER PEOPLE COULD HAVE NOTICED. OR THE OPPOSITE, BEING SO FIGETY OR RESTLESS THAT YOU HAVE BEEN MOVING AROUND A LOT MORE THAN USUAL: 0
4. FEELING TIRED OR HAVING LITTLE ENERGY: 1
1. LITTLE INTEREST OR PLEASURE IN DOING THINGS: 0
5. POOR APPETITE OR OVEREATING: 0
2. FEELING DOWN, DEPRESSED OR HOPELESS: 0
9. THOUGHTS THAT YOU WOULD BE BETTER OFF DEAD, OR OF HURTING YOURSELF: 0
7. TROUBLE CONCENTRATING ON THINGS, SUCH AS READING THE NEWSPAPER OR WATCHING TELEVISION: 0
SUM OF ALL RESPONSES TO PHQ QUESTIONS 1-9: 3
SUM OF ALL RESPONSES TO PHQ QUESTIONS 1-9: 3
6. FEELING BAD ABOUT YOURSELF - OR THAT YOU ARE A FAILURE OR HAVE LET YOURSELF OR YOUR FAMILY DOWN: 1
SUM OF ALL RESPONSES TO PHQ9 QUESTIONS 1 & 2: 0
SUM OF ALL RESPONSES TO PHQ QUESTIONS 1-9: 3
10. IF YOU CHECKED OFF ANY PROBLEMS, HOW DIFFICULT HAVE THESE PROBLEMS MADE IT FOR YOU TO DO YOUR WORK, TAKE CARE OF THINGS AT HOME, OR GET ALONG WITH OTHER PEOPLE: 0

## 2023-03-03 NOTE — PROGRESS NOTES
3/3/2023     Penny Coleman (:  1960) is a 59 y.o. male, here for evaluation of the following medical concerns:    HPI   Patient presented to the office follow-up from recent emergency room visit. .  Patient was seen at [address] [place] [address] emergency room 2023 due to chest tenderness with pain, reproducible with palpation, chest pain intermittent for the past 2 weeks. Stress test 6 or 8 months ago  was negative. Work-up at the emergency room included EKG was negative for acute changes. CT of the chest was negative for pulmonary embolism. Patient was discharged after receiving Toradol injection. Blood pressure is somewhat elevated today as patient appeared to be poorly compliant with hydralazine 100 mg which he only take once a day instead of twice daily , also on   amlodipine 10 mg daily and lisinopril 40 mg daily. He has hyperlipidemia and reported to be compliant with  statin, takes Lipitor 20 mg daily. He has hyperuricemia and history of gout and requested refill of allopurinol 300 mg daily. Patient denies headache nausea vomiting. Denies shortness of breath, denies bowel or urinary disturbance. Review of Systems   Constitutional:  Negative for activity change. HENT:  Negative for trouble swallowing and voice change. Eyes:  Negative for visual disturbance. Respiratory:  Negative for cough, shortness of breath and wheezing. Cardiovascular:  Negative for chest pain and leg swelling. Gastrointestinal:  Negative for abdominal pain, blood in stool, constipation and diarrhea. Genitourinary:  Negative for difficulty urinating, dysuria, frequency, hematuria and scrotal swelling. Musculoskeletal:  Negative for arthralgias and myalgias. Skin:  Negative for rash. Neurological:  Negative for dizziness. Psychiatric/Behavioral:  Negative for behavioral problems.       Prior to Visit Medications    Medication Sig Taking? Authorizing Provider   amLODIPine (NORVASC) 10 MG tablet Take 1 tablet by mouth daily Yes Cheryle Lai, MD   atorvastatin (LIPITOR) 20 MG tablet Take 1 tablet by mouth daily To prevent heart disease Yes Cheryle Lai, MD   allopurinol (ZYLOPRIM) 300 MG tablet Take 1 tablet by mouth daily For arthritis stop allopurinol 100 mg Yes Cheryle Lai, MD   naproxen (NAPROSYN) 500 MG tablet Take 1 tablet by mouth 2 times daily for 20 doses Yes Booker Girard MD   lisinopril (PRINIVIL;ZESTRIL) 40 MG tablet TAKE 1 TABLET BY MOUTH DAILY Yes Gulshan Blanco MD   tiZANidine (ZANAFLEX) 4 MG tablet Take 1 tablet by mouth every 6 hours as needed (prn) Take 4 mg by mouth every 6 hours as needed Yes Gulshan Blanco MD   aspirin EC 81 MG EC tablet Take 1 tablet by mouth in the morning. Yes Gulshan Blanco MD   hydrALAZINE (APRESOLINE) 100 MG tablet Take one tablet   Twice a day for blood pressure  Patient taking differently: Take one tablet   Twice a day for blood pressure Yes Cheryle Lai, MD   nitroGLYCERIN (NITROSTAT) 0.4 MG SL tablet DISSOLVE 1 TABLET UNDER THE TONGUE EVERY 5 MINUTES AS NEEDED FOR CHEST PAIN UP TO MAX OF 3 TOTAL DOSES. IF NO RELIEF AFTER 1 DOSE, CALL 911. Gulshan Blanco MD   sennosides-docusate sodium (SENOKOT-S) 8.6-50 MG tablet Take 2 tablets by mouth daily as needed for Constipation  James Cam, APRN - CNP        Social History     Tobacco Use    Smoking status: Never    Smokeless tobacco: Never   Substance Use Topics    Alcohol use: No        Vitals:    03/03/23 1530   BP: (!) 146/89   Pulse: (!) 117   Resp: 18   Temp: 98.6 °F (37 °C)   TempSrc: Temporal   SpO2: 98%   Weight: 224 lb (101.6 kg)     Estimated body mass index is 28.76 kg/m² as calculated from the following:    Height as of 2/26/23: 6' 2\" (1.88 m). Weight as of this encounter: 224 lb (101.6 kg). Physical Exam  Constitutional:       General: He is not in acute distress. Appearance: He is well-developed. HENT:      Head: Normocephalic. Eyes:      Conjunctiva/sclera: Conjunctivae normal.   Neck:      Thyroid: No thyromegaly. Cardiovascular:      Rate and Rhythm: Normal rate and regular rhythm. Heart sounds: Normal heart sounds. No murmur heard. Pulmonary:      Effort: No respiratory distress. Breath sounds: Normal breath sounds. No wheezing or rales. Abdominal:      General: There is no distension. Palpations: Abdomen is soft. There is no mass. Tenderness: There is no guarding or rebound. Musculoskeletal:         General: Normal range of motion. Cervical back: Neck supple. Skin:     General: Skin is warm. Findings: No rash. Neurological:      Mental Status: He is alert and oriented to person, place, and time. Psychiatric:         Behavior: Behavior normal.       ASSESSMENT/PLAN:  1. Chest wall pain  May take Tylenol as needed    2. Primary hypertension  Pressure somewhat elevated as patient is poorly compliant with medicine especially hydralazine 100 mg which she is taking only once a day, advised to increase it to 100 mg twice daily. Continue amlodipine 10 mg daily, lisinopril 40 mg daily  - amLODIPine (NORVASC) 10 MG tablet; Take 1 tablet by mouth daily  Dispense: 90 tablet; Refill: 1    3. Mixed hyperlipidemia  Controlled. Continue Lipitor 20 mg daily. 4. Hyperuricemia  He requested refill of allopurinol 300 mg daily.       Follow up with his PCP     An electronic signature was used to authenticate this note.    --Jaylin Rainey MD on 3/3/2023 at 3:51 PM

## 2023-04-05 ENCOUNTER — CARE COORDINATION (OUTPATIENT)
Dept: CARE COORDINATION | Age: 63
End: 2023-04-05

## 2023-04-05 NOTE — CARE COORDINATION
Ambulatory Care Coordination Note  2023    Patient Current Location:  Home: [address] [place] [address]     ACM contacted the patient by telephone. Verified name and  with patient as identifiers. Provided introduction to self, and explanation of the ACM role. Challenges to be reviewed by the provider   Additional needs identified to be addressed with provider: No  none               Method of communication with provider: none. ACM: Betty Baca RN    Acm out reach call placed to pt to check pt status, offer education and assess for any resource needs. No needs identified     Offered patient enrollment in the Remote Patient Monitoring (RPM) program for in-home monitoring: NA.     Lab Results       None            Care Coordination Interventions    Referral from Primary Care Provider: No  Suggested Interventions and Community Resources          Goals Addressed    None         Future Appointments   Date Time Provider Óscar Oleary   2023 10:00 AM MD Daphney Chapman RD

## 2023-04-19 ENCOUNTER — CARE COORDINATION (OUTPATIENT)
Dept: CARE COORDINATION | Age: 63
End: 2023-04-19

## 2023-04-28 ENCOUNTER — TELEPHONE (OUTPATIENT)
Dept: PRIMARY CARE CLINIC | Age: 63
End: 2023-04-28

## 2023-05-11 ENCOUNTER — CARE COORDINATION (OUTPATIENT)
Dept: CARE COORDINATION | Age: 63
End: 2023-05-11

## 2023-05-24 ENCOUNTER — OFFICE VISIT (OUTPATIENT)
Dept: PRIMARY CARE CLINIC | Age: 63
End: 2023-05-24
Payer: MEDICARE

## 2023-05-24 VITALS
HEART RATE: 79 BPM | HEIGHT: 74 IN | WEIGHT: 207 LBS | BODY MASS INDEX: 26.56 KG/M2 | SYSTOLIC BLOOD PRESSURE: 138 MMHG | DIASTOLIC BLOOD PRESSURE: 85 MMHG

## 2023-05-24 DIAGNOSIS — M10.9 GOUTY ARTHRITIS OF HAND: ICD-10-CM

## 2023-05-24 DIAGNOSIS — R07.89 ATYPICAL CHEST PAIN: ICD-10-CM

## 2023-05-24 DIAGNOSIS — I10 PRIMARY HYPERTENSION: ICD-10-CM

## 2023-05-24 DIAGNOSIS — Z82.49 FAMILY HISTORY OF ABDOMINAL AORTIC ANEURYSM (AAA): ICD-10-CM

## 2023-05-24 DIAGNOSIS — E78.00 PURE HYPERCHOLESTEROLEMIA: ICD-10-CM

## 2023-05-24 DIAGNOSIS — Z12.5 SCREENING FOR PROSTATE CANCER: ICD-10-CM

## 2023-05-24 DIAGNOSIS — Z00.00 ENCOUNTER FOR WELL ADULT EXAM WITHOUT ABNORMAL FINDINGS: Primary | ICD-10-CM

## 2023-05-24 LAB
CHOLEST SERPL-MCNC: 110 MG/DL (ref 0–199)
HDLC SERPL-MCNC: 39 MG/DL (ref 40–60)
LDL CHOLESTEROL CALCULATED: 44 MG/DL
PSA SERPL DL<=0.01 NG/ML-MCNC: 1.1 NG/ML (ref 0–4)
TRIGL SERPL-MCNC: 135 MG/DL (ref 0–150)
URATE SERPL-MCNC: 7.4 MG/DL (ref 3.5–7.2)
VLDLC SERPL CALC-MCNC: 27 MG/DL

## 2023-05-24 PROCEDURE — 99396 PREV VISIT EST AGE 40-64: CPT | Performed by: FAMILY MEDICINE

## 2023-05-24 PROCEDURE — 3075F SYST BP GE 130 - 139MM HG: CPT | Performed by: FAMILY MEDICINE

## 2023-05-24 PROCEDURE — 99212 OFFICE O/P EST SF 10 MIN: CPT | Performed by: FAMILY MEDICINE

## 2023-05-24 PROCEDURE — 3079F DIAST BP 80-89 MM HG: CPT | Performed by: FAMILY MEDICINE

## 2023-05-24 RX ORDER — LISINOPRIL 40 MG/1
40 TABLET ORAL DAILY
Qty: 90 TABLET | Refills: 1 | Status: SHIPPED | OUTPATIENT
Start: 2023-05-24

## 2023-05-24 RX ORDER — ALLOPURINOL 300 MG/1
300 TABLET ORAL DAILY
Qty: 90 TABLET | Refills: 1 | Status: SHIPPED | OUTPATIENT
Start: 2023-05-24

## 2023-05-24 RX ORDER — AMLODIPINE BESYLATE 10 MG/1
10 TABLET ORAL DAILY
Qty: 90 TABLET | Refills: 1 | Status: SHIPPED | OUTPATIENT
Start: 2023-05-24

## 2023-05-24 RX ORDER — ATORVASTATIN CALCIUM 20 MG/1
20 TABLET, FILM COATED ORAL DAILY
Qty: 90 TABLET | Refills: 1 | Status: SHIPPED | OUTPATIENT
Start: 2023-05-24

## 2023-05-24 ASSESSMENT — ENCOUNTER SYMPTOMS
ANAL BLEEDING: 0
BLOOD IN STOOL: 0
TROUBLE SWALLOWING: 0
WHEEZING: 0
EYE ITCHING: 0
ABDOMINAL PAIN: 0
COUGH: 0
VOICE CHANGE: 0
CONSTIPATION: 0
SORE THROAT: 0
EYE DISCHARGE: 0
RECTAL PAIN: 0
SHORTNESS OF BREATH: 0
PHOTOPHOBIA: 0
EYE REDNESS: 0
VOMITING: 0
COLOR CHANGE: 0
BACK PAIN: 0
EYE PAIN: 0
NAUSEA: 0
FACIAL SWELLING: 0
CHEST TIGHTNESS: 0
APNEA: 0
SINUS PRESSURE: 0
CHOKING: 0

## 2023-05-24 NOTE — PROGRESS NOTES
Hyperlipidemia  No muscle aches or muscle weakness or cramps since last visit. Review of Systems   Constitutional:  Negative for activity change, appetite change, chills, diaphoresis, fatigue, fever and unexpected weight change. HENT:  Negative for congestion, dental problem, drooling, ear discharge, ear pain, facial swelling, hearing loss, mouth sores, nosebleeds, postnasal drip, sinus pressure, sneezing, sore throat, tinnitus, trouble swallowing and voice change. Eyes:  Negative for photophobia, pain, discharge, redness, itching and visual disturbance. Respiratory:  Negative for apnea, cough, choking, chest tightness, shortness of breath and wheezing. Cardiovascular:  Negative for chest pain, palpitations and leg swelling. Gastrointestinal:  Negative for abdominal pain, anal bleeding, blood in stool, constipation, nausea, rectal pain and vomiting. Genitourinary:  Negative for decreased urine volume, difficulty urinating, dysuria, enuresis, flank pain, frequency, hematuria, penile discharge, penile swelling, scrotal swelling, testicular pain and urgency. Musculoskeletal:  Negative for arthralgias, back pain, gait problem, joint swelling, myalgias, neck pain and neck stiffness. Skin:  Negative for color change, pallor, rash and wound. Neurological:  Negative for dizziness, tremors, seizures, syncope, facial asymmetry, speech difficulty, weakness, light-headedness, numbness and headaches. Hematological:  Negative for adenopathy. Does not bruise/bleed easily. Psychiatric/Behavioral:  Negative for agitation, behavioral problems, confusion, decreased concentration, dysphoric mood, hallucinations, self-injury, sleep disturbance and suicidal ideas. The patient is not nervous/anxious and is not hyperactive. Objective   Physical Exam  Constitutional:       General: He is not in acute distress. Appearance: He is well-developed. He is not diaphoretic.    HENT:      Head:

## 2023-05-31 ENCOUNTER — HOSPITAL ENCOUNTER (OUTPATIENT)
Dept: ULTRASOUND IMAGING | Age: 63
Discharge: HOME | End: 2023-05-31
Attending: FAMILY MEDICINE
Payer: MEDICARE

## 2023-05-31 DIAGNOSIS — Z82.49 FAMILY HISTORY OF ABDOMINAL AORTIC ANEURYSM (AAA): ICD-10-CM

## 2023-05-31 PROCEDURE — 76706 US ABDL AORTA SCREEN AAA: CPT

## 2023-06-01 ENCOUNTER — TELEPHONE (OUTPATIENT)
Dept: PRIMARY CARE CLINIC | Age: 63
End: 2023-06-01

## 2023-08-02 NOTE — LETTER
? Possible Damage to Nerve, Vessel, Tendon/Muscle or Bone  ? Need for further Treatment/Surgery  ? Stiffness  ? Pain  ? Residual or Recurrent Symptoms  ? Anesthetic and/or Medical Risks  ? We have discussed the specific limitations and risks of hospital and/or office based treatment at this time due to the COVID-19 pandemic                I have been counseled about the risks of niall Covid-19 in the ester-operative and post-operative periods related to this procedure. I have been made aware that niall Covid-19 around the time of a surgical procedure may worsen my prognosis for recovering from the virus and lend to a higher morbidity and or mortality risk. With this knowledge, I have requested to proceed with the procedure as scheduled. 4. I have also been informed by the informing physician that there are other risks from both known and unknown causes that are attendant to the performance of any surgical procedure. I am aware that the practice of medicine and surgery is not an exact science, and that no guarantees have been made to me concerning the results of the operation and/or procedure(s). 5. I   CONSENT / REFUSE CONSENT  (strike the phrase that does not apply) to the taking of photographs before, during and/or after the operation or procedure for scientific/educational purposes. 6. I consent to the administration of anesthesia and to the use of such anesthetics as may be deemed advisable by the anesthesiologist who has been engaged by me or my physician.     7. I certify that I have read and understand the above consent to operation and/or other procedure(s); that the explanations therein referred to were made to me by the informing physician in advance of my signing this consent; that all blanks or statements requiring insertion or completion were filled in and inapplicable paragraphs, if any, were stricken before I signed; and that all questions asked by me about the communicate your restrictions to your employer, or if you do not present to work as you are scheduled to, Dr. Nohemy Baca will not provide an 'excuse' to explain your absence. A doctors note, or official forms (BWC, FMLA, etc.) will be filled out, upon request, to indicate your date of surgery and your restrictions as stated above. Dr. Nohemy Baca' Narcotic Policy  Patients will only be prescribed narcotics after surgical procedures or significant injury. Not all procedures cause pain great enough to require Narcotics and thus, not all patients will receive prescriptions after surgical procedures or injuries. Narcotics are never prescribed for chronic conditions. Narcotics are never prescribed for use longer than one week at a time. Refills are only granted in unusual circumstances and only at Dr. Kimberly Tilley discretion. Patients who are receiving narcotic medication from another physician or who are under pain management contracts will not be given a prescription for narcotics for any reason. I have read the above policies and understand that by agreeing to proceed with treatment by Dr. Ernestine Berry and his team, that I am agreeing to abide by these policies.   Patient Name:  [facility]    Patient Signature:  _____________________________    Debbie Mason Date:   9/4/20 [email or web address]

## 2024-07-22 ENCOUNTER — TRANSCRIBE ORDERS (OUTPATIENT)
Dept: ADMINISTRATIVE | Age: 64
End: 2024-07-22

## 2024-07-22 ENCOUNTER — HOSPITAL ENCOUNTER (OUTPATIENT)
Dept: MRI IMAGING | Age: 64
Discharge: HOME | End: 2024-07-22
Payer: MEDICARE

## 2024-07-22 DIAGNOSIS — M25.512 LEFT SHOULDER PAIN, UNSPECIFIED CHRONICITY: Primary | ICD-10-CM

## 2024-07-22 DIAGNOSIS — M25.512 LEFT SHOULDER PAIN, UNSPECIFIED CHRONICITY: ICD-10-CM

## 2024-07-22 PROCEDURE — 6360000004 HC RX CONTRAST MEDICATION: Performed by: STUDENT IN AN ORGANIZED HEALTH CARE EDUCATION/TRAINING PROGRAM

## 2024-07-22 PROCEDURE — A9577 INJ MULTIHANCE: HCPCS | Performed by: STUDENT IN AN ORGANIZED HEALTH CARE EDUCATION/TRAINING PROGRAM

## 2024-07-22 PROCEDURE — 73223 MRI JOINT UPR EXTR W/O&W/DYE: CPT

## 2024-07-22 RX ADMIN — GADOBENATE DIMEGLUMINE 17 ML: 529 INJECTION, SOLUTION INTRAVENOUS at 17:34

## (undated) DEVICE — GOWN,SIRUS,POLYRNF,BRTHSLV,XL,30/CS: Brand: MEDLINE

## (undated) DEVICE — ELECTRODE LAP L36CM PTFE WIRE J HK CLEANCOAT

## (undated) DEVICE — PROTECTOR ULN NRV PUR FOAM HK LOOP STRP ANATOMICALLY

## (undated) DEVICE — CHLORAPREP 26ML ORANGE

## (undated) DEVICE — CUFF RESTRN WRST OR ANK 45FT AD FOAM

## (undated) DEVICE — SUTURE VCRL SZ 3-0 L27IN ABSRB UD L26MM SH 1/2 CIR J416H

## (undated) DEVICE — CLIP INT M L POLYMER LOK LIG HEM O LOK

## (undated) DEVICE — SUTURE CHROMIC GUT SZ 4-0 L27IN ABSRB BRN FS-2 L19MM 3/8 635H

## (undated) DEVICE — MINOR SET UP PK

## (undated) DEVICE — WRAP COHESIVE W2INXL5YD TAN SELF ADH BNDG HND NON STERILE TEAR CARING

## (undated) DEVICE — GLOVE SURG SZ 75 CRM LTX FREE POLYISOPRENE POLYMER BEAD ANTI

## (undated) DEVICE — GLOVE SURG SZ 65 CRM LTX FREE POLYISOPRENE POLYMER BEAD ANTI

## (undated) DEVICE — SOLUTION IV IRRIG 250ML ST LF 0.9% SODIUM 2F7122

## (undated) DEVICE — SCISSORS ENDOSCP DIA5MM CRV MPLR CAUT W/ RATCH HNDL

## (undated) DEVICE — DRAPE HND W114XL142IN BLU POLYPR W O PCH FEN CRD AND TB HLDR

## (undated) DEVICE — CANNULA SAMP CO2 AD GRN 7FT CO2 AND 7FT O2 TBNG UNIV CONN

## (undated) DEVICE — SUTURE VCRL SZ 0 L27IN ABSRB UD L26MM CT-2 1/2 CIR J270H

## (undated) DEVICE — GOWN SIRUS NONREIN XL W/TWL: Brand: MEDLINE INDUSTRIES, INC.

## (undated) DEVICE — SHEET,T,THYROID,STERILE: Brand: MEDLINE

## (undated) DEVICE — NEURO SPONGES: Brand: DEROYAL

## (undated) DEVICE — DRAPE MICSCP W54XL150IN W/ 4 BINOC GLS LENS LEICA

## (undated) DEVICE — KIT,ANTI FOG,W/SPONGE & FLUID,SOFT PACK: Brand: MEDLINE

## (undated) DEVICE — C-ARM: Brand: UNBRANDED

## (undated) DEVICE — SUTURE PERMAHAND SZ 2-0 L12X18IN NONABSORBABLE BLK SILK A185H

## (undated) DEVICE — PADDING UNDERCAST W4INXL4YD 100% COT CRIMPED FINISH WBRL II

## (undated) DEVICE — BANDAGE COMPR W4INXL15FT BGE E SGL LAYERED CLP CLSR

## (undated) DEVICE — UNDERGLOVE SURG SZ 8 BLU LTX FREE SYN POLYISOPRENE POLYMER

## (undated) DEVICE — UNDERGLOVE SURG SZ 8 FNGR THK0.21MIL GRN LTX BEAD CUF

## (undated) DEVICE — PUMP SUC IRR TBNG L10FT W/ HNDPC ASSEMB STRYKEFLOW 2

## (undated) DEVICE — SUTURE MCRYL SZ 4-0 L18IN ABSRB UD L19MM PS-2 3/8 CIR PRIM Y496G

## (undated) DEVICE — COVER LT HNDL CAM BLU DISP W/ SURG CTRL

## (undated) DEVICE — LAMINECTOMY: Brand: MEDLINE INDUSTRIES, INC.

## (undated) DEVICE — SHEET,DRAPE,53X77,STERILE: Brand: MEDLINE

## (undated) DEVICE — SET EXTN PRIMING 4.9ML L30IN INCL SLDE CLMP SPIN M LUERLOCK

## (undated) DEVICE — STAPLER SKIN LEG L3.9MM DIA0.53MM WIDE ROT HD FOR WND CLSR

## (undated) DEVICE — BANDAGE COMPR W4INXL12FT E DISP ESMARCH EVEN

## (undated) DEVICE — INTENDED FOR TISSUE SEPARATION, AND OTHER PROCEDURES THAT REQUIRE A SHARP SURGICAL BLADE TO PUNCTURE OR CUT.: Brand: BARD-PARKER ® STAINLESS STEEL BLADES

## (undated) DEVICE — 40583 XL ADVANCED TRENDELENBURG POSITIONING KIT: Brand: 40583 XL ADVANCED TRENDELENBURG POSITIONING KIT

## (undated) DEVICE — SOLUTION IV 1000ML 0.9% SOD CHL

## (undated) DEVICE — SYRINGE MED 10ML LUERLOCK TIP W/O SFTY DISP

## (undated) DEVICE — SYMMETRY SHARP KERRISON® RONGEUR TIPS, THIN FOOTPLATE, 1 MM TIP, SINGLE USE, (3/BX): Brand: SYMMETRY SHARP KERRISON®

## (undated) DEVICE — TROCARS: Brand: KII® BALLOON BLUNT TIP SYSTEM

## (undated) DEVICE — TIP SRGCL 2MM DIA STNLSS STEEL SHARP KRRSN SNGLE USE

## (undated) DEVICE — TISSUE RETRIEVAL SYSTEM: Brand: INZII RETRIEVAL SYSTEM

## (undated) DEVICE — SPONGE,PEANUT,XRAY,ST,SM,3/8",5/CARD: Brand: MEDLINE INDUSTRIES, INC.

## (undated) DEVICE — COVER LT HNDL BLU PLAS

## (undated) DEVICE — ZIMMER® STERILE DISPOSABLE TOURNIQUET CUFF WITH PLC, DUAL PORT, SINGLE BLADDER, 18 IN. (46 CM)

## (undated) DEVICE — FORCEPS BX L240CM JAW DIA2.4MM ORNG L CAP W/ NDL DISP RAD

## (undated) DEVICE — Z DISCONTINUED USE 2275676 GLOVE SURG SZ 65 L12IN FNGR THK87MIL DK GRN LTX FREE ISOLEX

## (undated) DEVICE — APPLICATOR MEDICATED 26 CC SOLUTION HI LT ORNG CHLORAPREP

## (undated) DEVICE — GLOVE SURG SZ 6 L12IN FNGR THK75MIL WHT LTX POLYMER BEAD

## (undated) DEVICE — GLOVE SURG SZ 75 L12IN FNGR THK94MIL TRNSLUC YEL LTX

## (undated) DEVICE — GLOVE SURG SZ 65 THK91MIL LTX FREE SYN POLYISOPRENE

## (undated) DEVICE — BIT DRL L14MM DIA2.2MM G FLAT CHK FOR ANT CERV PLT SYS

## (undated) DEVICE — SOLUTION INJ LR VISIV 1000ML BG

## (undated) DEVICE — SUTURE VCRL SZ 3-0 L18IN ABSRB UD L26MM SH 1/2 CIR J864D

## (undated) DEVICE — BLADE ES ELASTOMERIC COAT INSUL DURABLE BEND UPTO 90DEG

## (undated) DEVICE — LARGE BORE STOPCOCK WITH ROTATING MALE LUER LOCK

## (undated) DEVICE — GENERAL LAPAROSCOPIC: Brand: MEDLINE INDUSTRIES, INC.

## (undated) DEVICE — TROCAR: Brand: KII FIOS FIRST ENTRY

## (undated) DEVICE — TROCAR: Brand: KII SHIELDED BLADED ACCESS SYSTEM

## (undated) DEVICE — ADHESIVE SKIN CLSR 0.7ML TOP DERMBND ADV

## (undated) DEVICE — SPONGE GZ W4XL4IN COT 12 PLY TYP VII WVN C FLD DSGN

## (undated) DEVICE — SUTURE MCRYL SZ 4-0 L27IN ABSRB UD L19MM PS-2 1/2 CIR PRIM Y426H

## (undated) DEVICE — TOWEL,STOP FLAG GOLD N-W: Brand: MEDLINE

## (undated) DEVICE — DRESSING PETRO W3XL3IN OIL EMUL N ADH GZ KNIT IMPREG CELOS

## (undated) DEVICE — CATHETER URET 4FR L70CM POLYUR WHSTL FLX TIP KINK RESIST W/

## (undated) DEVICE — TOOL 14MH30 LEGEND 14CM 3MM: Brand: MIDAS REX ™

## (undated) DEVICE — COUNTER NDL 40 COUNT HLD 70 NUM FOAM BLK SGL MAG W BLDE REMV

## (undated) DEVICE — LOOP LIG SUT SZ 0 L18IN ABSRB POLYDIOXANONE MFIL PDS II

## (undated) DEVICE — BLANKET WRM W40.2XL55.9IN IORT LO BODY + MISTRAL AIR

## (undated) DEVICE — CORD ES L10FT MPLR LAP

## (undated) DEVICE — GLOVE SURG SZ 65 L12IN FNGR THK79MIL GRN LTX FREE

## (undated) DEVICE — COVER,LIGHT HANDLE,FLX,1/PK: Brand: MEDLINE INDUSTRIES, INC.

## (undated) DEVICE — APPLICATOR PREP 26ML 0.7% IOD POVACRYLEX 74% ISO ALC ST

## (undated) DEVICE — GLOVE SURG SZ 7 L12IN FNGR THK75MIL WHT LTX POLYMER BEAD

## (undated) DEVICE — NEEDLE HYPO 22GA L1.5IN BLK POLYPR HUB S STL REG BVL STR

## (undated) DEVICE — EYE PROTECTOR FOAM MEDICHOICE